# Patient Record
Sex: FEMALE | Race: WHITE | NOT HISPANIC OR LATINO | URBAN - METROPOLITAN AREA
[De-identification: names, ages, dates, MRNs, and addresses within clinical notes are randomized per-mention and may not be internally consistent; named-entity substitution may affect disease eponyms.]

---

## 2017-04-15 ENCOUNTER — EMERGENCY (EMERGENCY)
Facility: HOSPITAL | Age: 22
LOS: 0 days | Discharge: HOME | End: 2017-04-15
Admitting: PEDIATRICS

## 2017-06-27 DIAGNOSIS — Z90.49 ACQUIRED ABSENCE OF OTHER SPECIFIED PARTS OF DIGESTIVE TRACT: ICD-10-CM

## 2017-06-27 DIAGNOSIS — Z88.0 ALLERGY STATUS TO PENICILLIN: ICD-10-CM

## 2017-06-27 DIAGNOSIS — R07.81 PLEURODYNIA: ICD-10-CM

## 2017-06-27 DIAGNOSIS — M54.5 LOW BACK PAIN: ICD-10-CM

## 2017-06-27 DIAGNOSIS — R30.0 DYSURIA: ICD-10-CM

## 2017-06-27 DIAGNOSIS — N39.0 URINARY TRACT INFECTION, SITE NOT SPECIFIED: ICD-10-CM

## 2017-07-08 ENCOUNTER — EMERGENCY (EMERGENCY)
Facility: HOSPITAL | Age: 22
LOS: 0 days | Discharge: HOME | End: 2017-07-08
Admitting: PEDIATRICS

## 2017-07-08 DIAGNOSIS — Z90.49 ACQUIRED ABSENCE OF OTHER SPECIFIED PARTS OF DIGESTIVE TRACT: ICD-10-CM

## 2017-07-08 DIAGNOSIS — K35.80 UNSPECIFIED ACUTE APPENDICITIS: ICD-10-CM

## 2017-07-08 DIAGNOSIS — Z88.0 ALLERGY STATUS TO PENICILLIN: ICD-10-CM

## 2017-07-08 DIAGNOSIS — O03.9 COMPLETE OR UNSPECIFIED SPONTANEOUS ABORTION WITHOUT COMPLICATION: ICD-10-CM

## 2017-07-08 DIAGNOSIS — N93.9 ABNORMAL UTERINE AND VAGINAL BLEEDING, UNSPECIFIED: ICD-10-CM

## 2018-02-23 ENCOUNTER — INPATIENT (INPATIENT)
Facility: HOSPITAL | Age: 23
LOS: 1 days | Discharge: HOME | End: 2018-02-25
Attending: INTERNAL MEDICINE

## 2018-02-23 VITALS
TEMPERATURE: 98 F | HEART RATE: 90 BPM | RESPIRATION RATE: 18 BRPM | SYSTOLIC BLOOD PRESSURE: 132 MMHG | OXYGEN SATURATION: 98 % | DIASTOLIC BLOOD PRESSURE: 70 MMHG

## 2018-02-23 DIAGNOSIS — Z00.00 ENCOUNTER FOR GENERAL ADULT MEDICAL EXAMINATION WITHOUT ABNORMAL FINDINGS: ICD-10-CM

## 2018-02-23 DIAGNOSIS — Z98.890 OTHER SPECIFIED POSTPROCEDURAL STATES: Chronic | ICD-10-CM

## 2018-02-23 DIAGNOSIS — R20.0 ANESTHESIA OF SKIN: ICD-10-CM

## 2018-02-23 DIAGNOSIS — M32.9 SYSTEMIC LUPUS ERYTHEMATOSUS, UNSPECIFIED: ICD-10-CM

## 2018-02-23 LAB
ALBUMIN SERPL ELPH-MCNC: 4 G/DL — SIGNIFICANT CHANGE UP (ref 3–5.5)
ALP SERPL-CCNC: 95 U/L — SIGNIFICANT CHANGE UP (ref 30–115)
ALT FLD-CCNC: 18 U/L — SIGNIFICANT CHANGE UP (ref 0–41)
ANION GAP SERPL CALC-SCNC: 8 MMOL/L — SIGNIFICANT CHANGE UP (ref 7–14)
APTT BLD: 30.3 SEC — SIGNIFICANT CHANGE UP (ref 27–39.2)
AST SERPL-CCNC: 16 U/L — SIGNIFICANT CHANGE UP (ref 0–41)
BASOPHILS # BLD AUTO: 0.03 K/UL — SIGNIFICANT CHANGE UP (ref 0–0.2)
BASOPHILS NFR BLD AUTO: 0.4 % — SIGNIFICANT CHANGE UP (ref 0–1)
BILIRUB SERPL-MCNC: 0.8 MG/DL — SIGNIFICANT CHANGE UP (ref 0.2–1.2)
BLD GP AB SCN SERPL QL: SIGNIFICANT CHANGE UP
BUN SERPL-MCNC: 14 MG/DL — SIGNIFICANT CHANGE UP (ref 10–20)
CALCIUM SERPL-MCNC: 9.3 MG/DL — SIGNIFICANT CHANGE UP (ref 8.5–10.1)
CHLORIDE SERPL-SCNC: 103 MMOL/L — SIGNIFICANT CHANGE UP (ref 98–110)
CO2 SERPL-SCNC: 25 MMOL/L — SIGNIFICANT CHANGE UP (ref 17–32)
CREAT SERPL-MCNC: 0.7 MG/DL — SIGNIFICANT CHANGE UP (ref 0.7–1.5)
EOSINOPHIL # BLD AUTO: 0.19 K/UL — SIGNIFICANT CHANGE UP (ref 0–0.7)
EOSINOPHIL NFR BLD AUTO: 2.4 % — SIGNIFICANT CHANGE UP (ref 0–8)
GLUCOSE SERPL-MCNC: 102 MG/DL — SIGNIFICANT CHANGE UP (ref 70–110)
HCG SERPL-ACNC: <0.6 MIU/ML — SIGNIFICANT CHANGE UP (ref 0–5)
HCT VFR BLD CALC: 42.4 % — SIGNIFICANT CHANGE UP (ref 37–47)
HGB BLD-MCNC: 14.5 G/DL — SIGNIFICANT CHANGE UP (ref 14–18)
IMM GRANULOCYTES NFR BLD AUTO: 0.2 % — SIGNIFICANT CHANGE UP (ref 0.1–0.3)
INR BLD: 1.14 RATIO — SIGNIFICANT CHANGE UP (ref 0.65–1.3)
LYMPHOCYTES # BLD AUTO: 2.78 K/UL — SIGNIFICANT CHANGE UP (ref 1.2–3.4)
LYMPHOCYTES # BLD AUTO: 34.4 % — SIGNIFICANT CHANGE UP (ref 20.5–51.1)
MCHC RBC-ENTMCNC: 29.9 PG — SIGNIFICANT CHANGE UP (ref 27–31)
MCHC RBC-ENTMCNC: 34.2 G/DL — SIGNIFICANT CHANGE UP (ref 32–37)
MCV RBC AUTO: 87.4 FL — SIGNIFICANT CHANGE UP (ref 81–91)
MONOCYTES # BLD AUTO: 0.52 K/UL — SIGNIFICANT CHANGE UP (ref 0.1–0.6)
MONOCYTES NFR BLD AUTO: 6.4 % — SIGNIFICANT CHANGE UP (ref 1.7–9.3)
NEUTROPHILS # BLD AUTO: 4.54 K/UL — SIGNIFICANT CHANGE UP (ref 1.4–6.5)
NEUTROPHILS NFR BLD AUTO: 56.2 % — SIGNIFICANT CHANGE UP (ref 42.2–75.2)
NRBC # BLD: 0 /100 WBCS — SIGNIFICANT CHANGE UP (ref 0–0)
PLATELET # BLD AUTO: 287 K/UL — SIGNIFICANT CHANGE UP (ref 130–400)
POTASSIUM SERPL-MCNC: 4 MMOL/L — SIGNIFICANT CHANGE UP (ref 3.5–5)
POTASSIUM SERPL-SCNC: 4 MMOL/L — SIGNIFICANT CHANGE UP (ref 3.5–5)
PROT SERPL-MCNC: 7.4 G/DL — SIGNIFICANT CHANGE UP (ref 6–8)
PROTHROM AB SERPL-ACNC: 12.4 SEC — SIGNIFICANT CHANGE UP (ref 9.95–12.87)
RBC # BLD: 4.85 M/UL — SIGNIFICANT CHANGE UP (ref 4.2–5.4)
RBC # FLD: 11.6 % — SIGNIFICANT CHANGE UP (ref 11.5–14.5)
SODIUM SERPL-SCNC: 136 MMOL/L — SIGNIFICANT CHANGE UP (ref 135–146)
TROPONIN I SERPL-MCNC: <0.02 NG/ML — SIGNIFICANT CHANGE UP (ref 0–0.05)
TYPE + AB SCN PNL BLD: SIGNIFICANT CHANGE UP
WBC # BLD: 8.08 K/UL — SIGNIFICANT CHANGE UP (ref 4.8–10.8)
WBC # FLD AUTO: 8.08 K/UL — SIGNIFICANT CHANGE UP (ref 4.8–10.8)

## 2018-02-23 RX ORDER — HEPARIN SODIUM 5000 [USP'U]/ML
5000 INJECTION INTRAVENOUS; SUBCUTANEOUS EVERY 8 HOURS
Qty: 0 | Refills: 0 | Status: DISCONTINUED | OUTPATIENT
Start: 2018-02-23 | End: 2018-02-25

## 2018-02-23 RX ORDER — ATORVASTATIN CALCIUM 80 MG/1
80 TABLET, FILM COATED ORAL AT BEDTIME
Qty: 0 | Refills: 0 | Status: DISCONTINUED | OUTPATIENT
Start: 2018-02-23 | End: 2018-02-25

## 2018-02-23 RX ORDER — ACETAMINOPHEN 500 MG
650 TABLET ORAL EVERY 6 HOURS
Qty: 0 | Refills: 0 | Status: DISCONTINUED | OUTPATIENT
Start: 2018-02-23 | End: 2018-02-25

## 2018-02-23 RX ORDER — SODIUM CHLORIDE 9 MG/ML
1000 INJECTION INTRAMUSCULAR; INTRAVENOUS; SUBCUTANEOUS
Qty: 0 | Refills: 0 | Status: DISCONTINUED | OUTPATIENT
Start: 2018-02-23 | End: 2018-02-25

## 2018-02-23 RX ORDER — ASPIRIN/CALCIUM CARB/MAGNESIUM 324 MG
325 TABLET ORAL ONCE
Qty: 0 | Refills: 0 | Status: COMPLETED | OUTPATIENT
Start: 2018-02-23 | End: 2018-02-23

## 2018-02-23 RX ORDER — ASPIRIN/CALCIUM CARB/MAGNESIUM 324 MG
81 TABLET ORAL DAILY
Qty: 0 | Refills: 0 | Status: DISCONTINUED | OUTPATIENT
Start: 2018-02-23 | End: 2018-02-25

## 2018-02-23 RX ADMIN — HEPARIN SODIUM 5000 UNIT(S): 5000 INJECTION INTRAVENOUS; SUBCUTANEOUS at 21:43

## 2018-02-23 RX ADMIN — Medication 325 MILLIGRAM(S): at 17:34

## 2018-02-23 RX ADMIN — SODIUM CHLORIDE 100 MILLILITER(S): 9 INJECTION INTRAMUSCULAR; INTRAVENOUS; SUBCUTANEOUS at 23:03

## 2018-02-23 RX ADMIN — ATORVASTATIN CALCIUM 80 MILLIGRAM(S): 80 TABLET, FILM COATED ORAL at 21:44

## 2018-02-23 RX ADMIN — HEPARIN SODIUM 5000 UNIT(S): 5000 INJECTION INTRAVENOUS; SUBCUTANEOUS at 15:08

## 2018-02-23 RX ADMIN — SODIUM CHLORIDE 100 MILLILITER(S): 9 INJECTION INTRAMUSCULAR; INTRAVENOUS; SUBCUTANEOUS at 15:08

## 2018-02-23 NOTE — PROGRESS NOTE ADULT - SUBJECTIVE AND OBJECTIVE BOX
***STROKE ALERT CONSULT NOTE    Chief Complaint: stroke alert    Last known normal time:    HPI: 22 yo f pmh of possible lupus, reports MELANIE elevated. Went to sleep 1130 previous night asymptomatic. Woke up 0700 with left sided headache and left tounge/lue/lle numbness. Patients symptoms improved but are persistent, headache is persistent. Patient came to ER and stroke code called.       PAST MEDICAL & SURGICAL HISTORY:  No pertinent past medical history  No significant past surgical history      FAMILY HISTORY:      Social History: (-) x 3    Allergies    ciprofloxacin (Other (Mild))  penicillins (Unknown)    MEDICATIONS  (STANDING):    MEDICATIONS  (PRN):        Vital Signs Last 24 Hrs  T(C): 36.5 (23 Feb 2018 09:42), Max: 36.5 (23 Feb 2018 09:42)  T(F): 97.7 (23 Feb 2018 09:42), Max: 97.7 (23 Feb 2018 09:42)  HR: 90 (23 Feb 2018 09:42) (90 - 90)  BP: 132/70 (23 Feb 2018 09:42) (132/70 - 132/70)  BP(mean): --  RR: 18 (23 Feb 2018 09:42) (18 - 18)  SpO2: 98% (23 Feb 2018 09:42) (98% - 98%)    Neurologic Examination:  General:  Appearance is consistent with chronologic age.  No abnormal facies.   General: The patient is oriented to person, place, time and date.   Fund of knowledge is intact and normal.  Language with normal repetition, comprehension and naming.  Nondysarthric.    Cranial nerves: intact VA, VFF.  EOMI w/o nystagmus, skew or reported double vision.  PERRL.  No ptosis/weakness of eyelid closure.  Facial sensation is normal with normal bite. Mild left nasolabial fold flattening.  Hearing grossly intact b/l.  Palate elevates midline.  Tongue midline.  Motor examination:   Normal tone, bulk and range of motion.  No tenderness, twitching, tremors or involuntary movements.  Formal Muscle Strength Testing: (MRC grade R/L) 5/5 UE; 5/5 LE.  No observable drift.  Reflexes:   2+ b/l pectoralis, biceps, triceps, brachioradialis, patella and Achilles.  Plantar response downgoing b/l.  Jaw jerk, Arielle, clonus absent.  Sensory examination: decreased sensation to light touch on lue lle and left forehead/reports tongue numbness.  Cerebellum:   FTN/HKS intact with normal AVIVA in all limbs.  No dysmetria or dysdiadokinesia.    NIH Stroke Scale:2    Labs:                   Neuroimaging:  NCHCT:    CT Angiography/Perfusion (if applicable):    MRI Brain NC (if applicable):    MRA Head/Neck (if applicable):    Echocardiography:    Carodtid/Transcranial Duplex:    Assessment:  This is a 23y Female with h/o possible lupus, reports MELANIE elevated. Went to sleep 1130 previous night asymptomatic. Woke up 0700 with left sided headache and left tounge/lue/lle numbness. Patients symptoms improved but are persistent, headache is persistent. Patient came to ER and stroke code called.   Patient outside therapeutic window for TPA.    Suggestion:  Stroke Unit(Dr Obrien approved)  MRI BRain w/o mirian  TTE bubble study  MELANIE  Lipid Profile/Hemoglobin A1C    Ernesto Cm NP  -   02-23-18 @ 10:23 ***STROKE ALERT CONSULT NOTE    Chief Complaint: stroke alert    Last known normal time:    HPI: 22 yo f pmh of possible lupus, reports MELANIE elevated. Went to sleep 1130 previous night asymptomatic. Woke up 0700 with left sided headache and left tounge/lue/lle numbness. Patients symptoms improved but are persistent, headache is persistent. Patient came to ER and stroke code called.       PAST MEDICAL & SURGICAL HISTORY:  No pertinent past medical history  No significant past surgical history      FAMILY HISTORY:      Social History: (-) x 3    Allergies    ciprofloxacin (Other (Mild))  penicillins (Unknown)    MEDICATIONS  (STANDING):    MEDICATIONS  (PRN):        Vital Signs Last 24 Hrs  T(C): 36.5 (23 Feb 2018 09:42), Max: 36.5 (23 Feb 2018 09:42)  T(F): 97.7 (23 Feb 2018 09:42), Max: 97.7 (23 Feb 2018 09:42)  HR: 90 (23 Feb 2018 09:42) (90 - 90)  BP: 132/70 (23 Feb 2018 09:42) (132/70 - 132/70)  BP(mean): --  RR: 18 (23 Feb 2018 09:42) (18 - 18)  SpO2: 98% (23 Feb 2018 09:42) (98% - 98%)    Neurologic Examination:  General:  Appearance is consistent with chronologic age.  No abnormal facies.   General: The patient is oriented to person, place, time and date.   Fund of knowledge is intact and normal.  Language with normal repetition, comprehension and naming.  Nondysarthric.    Cranial nerves: intact VA, VFF.  EOMI w/o nystagmus, skew or reported double vision.  PERRL.  No ptosis/weakness of eyelid closure.  Facial sensation is normal with normal bite. Mild left nasolabial fold flattening.  Hearing grossly intact b/l.  Palate elevates midline.  Tongue midline.  Motor examination:   Normal tone, bulk and range of motion.  No tenderness, twitching, tremors or involuntary movements.  Formal Muscle Strength Testing: (MRC grade R/L) 5/5 UE; 5/5 LE.  No observable drift.  Reflexes:   2+ b/l pectoralis, biceps, triceps, brachioradialis, patella and Achilles.  Plantar response downgoing b/l.  Jaw jerk, Arielle, clonus absent.  Sensory examination: decreased sensation to light touch on lue lle and left forehead/reports tongue numbness.  Cerebellum:   FTN/HKS intact with normal AVIVA in all limbs.  No dysmetria or dysdiadokinesia.    NIH Stroke Scale:2    Labs:                   Neuroimaging:  NCHCT:< from: CT Head No Cont (02.23.18 @ 10:21) >  1.  No CT evidence of acute intracranial pathology    2.  Paranasal sinus disease as described above.      < end of copied text >      CT Angiography/Perfusion (if applicable):	pending cta h+n    MRI Brain NC (if applicable):    MRA Head/Neck (if applicable):    Echocardiography:    Carodtid/Transcranial Duplex:    Assessment:  This is a 23y Female with h/o possible lupus, reports MELANIE elevated. Went to sleep 1130 previous night asymptomatic. Woke up 0700 with left sided headache and left tounge/lue/lle numbness. Patients symptoms improved but are persistent, headache is persistent. Patient came to ER and stroke code called.   Patient outside therapeutic window for TPA.    Suggestion:  Stroke Unit(Dr Obrien approved)  MRI BRain w/wo mirian  TTE bubble study  Lipid Profile/Hemoglobin A1C  normal saline 100 ml/hr  Ernesto Cm NP  -   02-23-18 @ 10:23 ***STROKE ALERT CONSULT NOTE    Chief Complaint: stroke alert    Last known normal time:    HPI: 24 yo f pmh of possible lupus, reports MELANIE elevated. Went to sleep 1130 previous night asymptomatic. Woke up 0700 with left sided headache and left tounge/lue/lle numbness. Patients symptoms improved but are persistent, headache is persistent. Patient came to ER and stroke code called.       PAST MEDICAL & SURGICAL HISTORY:  No pertinent past medical history  No significant past surgical history      FAMILY HISTORY:      Social History: (-) x 3    Allergies    ciprofloxacin (Other (Mild))  penicillins (Unknown)    MEDICATIONS  (STANDING):    MEDICATIONS  (PRN):        Vital Signs Last 24 Hrs  T(C): 36.5 (23 Feb 2018 09:42), Max: 36.5 (23 Feb 2018 09:42)  T(F): 97.7 (23 Feb 2018 09:42), Max: 97.7 (23 Feb 2018 09:42)  HR: 90 (23 Feb 2018 09:42) (90 - 90)  BP: 132/70 (23 Feb 2018 09:42) (132/70 - 132/70)  BP(mean): --  RR: 18 (23 Feb 2018 09:42) (18 - 18)  SpO2: 98% (23 Feb 2018 09:42) (98% - 98%)    Neurologic Examination:  General:  Appearance is consistent with chronologic age.  No abnormal facies.   General: The patient is oriented to person, place, time and date.   Fund of knowledge is intact and normal.  Language with normal repetition, comprehension and naming.  Nondysarthric.    Cranial nerves: intact VA, VFF.  EOMI w/o nystagmus, skew or reported double vision.  PERRL.  No ptosis/weakness of eyelid closure.  Facial sensation is normal with normal bite. Mild left nasolabial fold flattening.  Hearing grossly intact b/l.  Palate elevates midline.  Tongue midline.  Motor examination:   Normal tone, bulk and range of motion.  No tenderness, twitching, tremors or involuntary movements.  Formal Muscle Strength Testing: (MRC grade R/L) 5/5 UE; 5/5 LE.  No observable drift.  Reflexes:   2+ b/l pectoralis, biceps, triceps, brachioradialis, patella and Achilles.  Plantar response downgoing b/l.  Jaw jerk, Arielle, clonus absent.  Sensory examination: decreased sensation to light touch on lue lle and left forehead/reports tongue numbness.  Cerebellum:   FTN/HKS intact with normal AVIVA in all limbs.  No dysmetria or dysdiadokinesia.    NIH Stroke Scale:2    Labs:                   Neuroimaging:  NCHCT:< from: CT Head No Cont (02.23.18 @ 10:21) >  1.  No CT evidence of acute intracranial pathology    2.  Paranasal sinus disease as described above.      < end of copied text >      CT Angiography/Perfusion (if applicable):	pending cta h+n    MRI Brain NC (if applicable):    MRA Head/Neck (if applicable):    Echocardiography:    Carodtid/Transcranial Duplex:    Assessment:  This is a 23y Female with h/o possible lupus, reports MELANIE elevated. Went to sleep 1130 previous night asymptomatic. Woke up 0700 with left sided headache and left tounge/lue/lle numbness. Patients symptoms improved but are persistent, headache is persistent. Patient came to ER and stroke code called.   Patient outside therapeutic window for TPA.    Suggestion:  Stroke Unit(Dr Obrien approved)  MRI BRain w/wo mirian  TTE bubble study  asa/lipitor  Lipid Profile/Hemoglobin A1C  normal saline 100 ml/hr  Ernesto Cm NP  -   02-23-18 @ 10:23

## 2018-02-23 NOTE — CONSULT NOTE ADULT - SUBJECTIVE AND OBJECTIVE BOX
HPI:  This is a 24 YO F, PMH Lupus, not taking medications, p/w above CC. She states that she woke this morning at 7:00am with a severe headache, and when she used her hand to rub her face she realized she had no feeling in her left hand. The numbness includes her left UE, LE, face, and left side of her tongue. She also states her left hand was clumsy and she had difficulty getting dressed with that hand. She denies and recent illnesses and states she was feeling well the night prior to going to sleep. Denies any other symptoms, ROS otherwise negative.    VITALS:   Vital Signs Last 24 Hrs  T(C): 35.6 (2018 11:34), Max: 36.5 (2018 09:42)  T(F): 96 (:34), Max: 97.7 (2018 09:42)  HR: 76 (:34) (76 - 90)  BP: 100/55 (2018 11:34) (100/55 - 132/70)  BP(mean): --  RR: 18 (2018 11:34) (18 - 18)  SpO2: 98% (2018 11:34) (98% - 98%)  Daily     Daily Weight in k.3 (2018 11:34) (2018 13:18)      T(C): 36.6 (18 @ 16:24), Max: 36.6 (18 @ 16:24)  HR: 69 (-18 @ 16:24) (69 - 90)  BP: 111/73 (23-18 @ 16:24) (100/55 - 132/70)  RR: 18 (-18 @ 16:24) (18 - 18)  SpO2: 100% (-18 @ 16:24) (98% - 100%)    MOTOR EXAM  Good ROM All EXt MS 5/5   diminished sensation lue and lle   -drift  AVIVA coordination LUE WFL  AMBULATES IN ER WITHOUT DIFFICULTY      Physical Medicine And Rehabilitation Services are not indicated in this patient for the following Reason(s):    [    ] Patient is medically unstable      [    ]  Patient does not have appropriate activity orders      [     ] Patient has no weight bearing status for:      [  x   ] Patient is independently ambulating      [     ]  Patient is from Skilled Nursing Facility and is appropriate to return      [     ] Patient was non-ambulatory prior to admission      [     ]  Other  DC HOME ONCE NEURO W/U COMPLETED    WILL CANCEL PM&R / PT request

## 2018-02-23 NOTE — H&P ADULT - PROBLEM SELECTOR PLAN 1
Stroke Unit(Dr Obrien approved)  MRI Brain w/wo mirian  TTE bubble study  Lipid Profile  HbA1C  normal saline 100 ml/hr

## 2018-02-23 NOTE — H&P ADULT - NSHPPHYSICALEXAM_GEN_ALL_CORE
PHYSICAL EXAM:  GENERAL: NAD, well-developed  HEAD:  Atraumatic, Normocephalic  EYES: EOMI, PERRLA, conjunctiva and sclera clear  NECK: Supple, No JVD  CHEST/LUNG: Clear to auscultation bilaterally; No wheeze  HEART: Regular rate and rhythm; No murmurs, rubs, or gallops  ABDOMEN: Soft, Nontender, Nondistended; Bowel sounds present  EXTREMITIES:  2+ Peripheral Pulses, No clubbing, cyanosis, or edema  PSYCH: AAOx3  NEUROLOGY: L sided sensory decreased. No motor deficits, CN II-XII intact, no cerebellar findings.  SKIN: No rashes or lesions

## 2018-02-23 NOTE — ED PROVIDER NOTE - OBJECTIVE STATEMENT
Pertinent PMH/PSH/FHx/SHx and Review of Systems contained within:  Patient presents to the ED for  22y/o F w/ hx of lupus-- not taking any medications because it was making her gain weight.  Pt woke up this morning with headache to occiput and L. side of head (has hx of headaches, but never this bad) pt also had some discomfort to her L. hand.  20 mins before presentation pt started to get numbness to L. hand, leg,     PCP-Dr. Bunch

## 2018-02-23 NOTE — H&P ADULT - NSHPREVIEWOFSYSTEMS_GEN_ALL_CORE
REVIEW OF SYSTEMS:    CONSTITUTIONAL: No weakness, fevers or chills  EYES/ENT: No visual changes;  No vertigo or throat pain   NECK: No pain or stiffness  RESPIRATORY: No cough, wheezing, hemoptysis; No shortness of breath  CARDIOVASCULAR: No chest pain or palpitations  GASTROINTESTINAL: No abdominal or epigastric pain. No nausea, vomiting, or hematemesis; No diarrhea or constipation. No melena or hematochezia.  GENITOURINARY: No dysuria, frequency or hematuria  NEUROLOGICAL: See HPI  MUSCULOSKELETAL: No muscle or joint pain, stiffness, or swelling  SKIN: No itching, rashes

## 2018-02-23 NOTE — ED PROVIDER NOTE - PHYSICAL EXAMINATION
VITAL SIGNS: I have reviewed nursing notes and confirm.  CONSTITUTIONAL: Well-developed; well-nourished; in no acute distress. pt comfortable.  SKIN: skin exam is warm and dry, no acute rash.   HEAD: Normocephalic; atraumatic.  EYES:  EOM intact; conjunctiva and sclera clear.  ENT: No nasal discharge; airway clear. moist oral mucosa; uvula at midline. no pharyngeal erythema, edema exudate or vesicles.  TMs with good light reflex b/l.    NECK: Supple; non tender.  CARD: S1, S2 normal; no murmurs, gallops, or rubs. Regular rate and rhythm. posterior tibial and radial pulses 2+  RESP: No wheezes, rales or rhonchi. cta b/l. no use of accessory muscles. no retractions  ABD: Normal bowel sounds; soft; non-distended; non-tender; no rebound. negative psoas, rovsign's and murphys.  EXT: Normal ROM. No  cyanosis or edema.  BACK: No cva tenderness  LYMPH: No acute cervical adenopathy  NEURO: Alert, oriented, grossly unremarkable.  CN 2-12 intact--aside mild nasolabial flattening to left. drift to left hand w/ romberg.  sensory decrease to L face, upper and lower extremity.  5/5 strength to ,  flexion at hip  PSYCH: Cooperative, appropriate. VITAL SIGNS: I have reviewed nursing notes and confirm.  CONSTITUTIONAL: Well-developed; well-nourished; in no acute distress. pt comfortable.  SKIN: skin exam is warm and dry, no acute rash.   HEAD: Normocephalic; atraumatic.  EYES:  EOM intact; conjunctiva and sclera clear.  ENT: No nasal discharge; airway clear. moist oral mucosa; uvula at midline. no pharyngeal erythema, edema exudate or vesicles.  TMs with good light reflex b/l.    NECK: Supple; non tender.  CARD: S1, S2 normal; no murmurs, gallops, or rubs. Regular rate and rhythm. posterior tibial and radial pulses 2+  RESP: No wheezes, rales or rhonchi. cta b/l. no use of accessory muscles. no retractions  ABD: Normal bowel sounds; soft; non-distended; non-tender; no rebound. negative psoas, rovsign's and murphys.  EXT: Normal ROM. No  cyanosis or edema.  BACK: No cva tenderness  LYMPH: No acute cervical adenopathy  NEURO: Alert, oriented, grossly unremarkable.  CN 2-12 intact--aside mild nasolabial flattening to left. drift to left hand w/ romberg.  sensory decrease to L face, upper and lower extremity.  5/5 strength to ,  flexion at hip. pt providing history-- no slurring.  able to repeat phrases.  PSYCH: Cooperative, appropriate.

## 2018-02-23 NOTE — H&P ADULT - HISTORY OF PRESENT ILLNESS
This is a 24 YO F, PMH Lupus, not taking medications, p/w above CC. She states that she woke this morning at 7:00am with a severe headache, and when she used her hand to rub her face she realized she had no feeling in her left hand. The numbness includes her left UE, LE, face, and left side of her tongue. She also states her left hand was clumsy and she had difficulty getting dressed with that hand. She denies and recent illnesses and states she was feeling well the night prior to going to sleep. Denies any other symptoms, ROS otherwise negative.    VITALS:   Vital Signs Last 24 Hrs  T(C): 35.6 (2018 11:34), Max: 36.5 (2018 09:42)  T(F): 96 (:34), Max: 97.7 (2018 09:42)  HR: 76 (:) (76 - 90)  BP: 100/55 (:34) (100/55 - 132/70)  BP(mean): --  RR: 18 (2018 11:34) (18 - 18)  SpO2: 98% (:34) (98% - 98%)  Daily     Daily Weight in k.3 (:34)

## 2018-02-23 NOTE — ED ADULT NURSE NOTE - OBJECTIVE STATEMENT
pt with c/o headache today, pt felt right hand numbness , left leg numbness and lip numbness twenty min prior to coming to the er. stroke code called at 09:00 in er.

## 2018-02-23 NOTE — H&P ADULT - ATTENDING COMMENTS
Pt seen and evaluated independently.  She states numbness of left arm, face and leg is improved but intermittent.    She states she was dx with SLE and on prednisone at one point, but not now.   Neurology consult appreciated.   Awaiting MRI of brain and 2D ECHO - rule out CVA.   On ASA and statin for now.   I reviewed the resident's note and I agree with the physical exam, assessment, and plan with additions as above.

## 2018-02-23 NOTE — H&P ADULT - ASSESSMENT
22 YO F, PMH of Lupus not on medications p/w L sided numbness. Stroke code called in ED but cancelled as Pt was outside of therapeutic window for tPA. Seen by neuro who recommend stroke unit observation and MRI.

## 2018-02-23 NOTE — ED PROVIDER NOTE - NS ED ROS FT
ROS: No fever/chills, No headache/photophobia/eye pain/changes in vision, No ear pain/sore throat/dysphagia, No chest pain/palpitations, no SOB/cough/wheeze/stridor, No abdominal pain, No N/V/D/melena, no dysuria/frequency/discharge, No neck/back pain, no rash,       +numbness to l. side of body.

## 2018-02-23 NOTE — H&P ADULT - NSHPLABSRESULTS_GEN_ALL_CORE
14.5   8.08  )-----------( 287      ( 23 Feb 2018 09:55 )             42.4       02-23    136  |  103  |  14  ----------------------------<  102  4.0   |  25  |  0.7    Ca    9.3      23 Feb 2018 09:55    TPro  7.4  /  Alb  4.0  /  TBili  0.8  /  DBili  x   /  AST  16  /  ALT  18  /  AlkPhos  95  02-23          PT/INR - ( 23 Feb 2018 09:55 )   PT: 12.40 sec;   INR: 1.14 ratio         PTT - ( 23 Feb 2018 09:55 )  PTT:30.3 sec    Lactate Trend    CARDIAC MARKERS ( 23 Feb 2018 09:55 )  <0.02 ng/mL / x     / x     / x     / x        CAPILLARY BLOOD GLUCOSE  102 (23 Feb 2018 10:16)    IMAGING:     < from: CT Head No Cont (02.23.18 @ 10:21) >    1.  No CT evidence of acute intracranial pathology    2.  Paranasal sinus disease as described above.    If the patient continues to be symptomatic follow-up CT scan and/or MRI   of the brain may be helpful for further evaluation.    < end of copied text >    < from: Xray Chest 1 View AP/PA (02.23.18 @ 10:08) >    No radiographic evidence of acute cardiopulmonary disease.    < end of copied text >    < from: 12 Lead ECG (02.23.18 @ 11:03) >    Ventricular Rate 72 BPM    Atrial Rate 72 BPM    P-R Interval 122 ms    QRS Duration 76 ms    Q-T Interval 394 ms    QTC Calculation(Bezet) 431 ms    P Axis 14 degrees    R Axis 20 degrees    T Axis 33 degrees    Diagnosis Line Normal sinus rhythm  Normal ECG    < end of copied text >

## 2018-02-23 NOTE — ED PROVIDER NOTE - ATTENDING CONTRIBUTION TO CARE
Pt is a 24yo female with ?Lupus noncompliant with Prednisone who comes in after waking up today with a headache and developing L arm numbness/weakness 20min PTA.  She couldn't feel the cold or hot water.  She also developed some numbness into the L leg and L tongue.    Exam: L arm 4/5 weakness, R arm normal, L leg 4.5/5 strength, R leg 5/5 strength, limping gait, normal CNs  Imp: r/o CVA, ?complex migraine  Plan: stroke code, imaging, neuro, labs

## 2018-02-23 NOTE — STROKE CODE NOTE - NIH STROKE SCALE: 8. SENSORY, QM
(1) Mild-to-moderate sensory loss; patient feels pinprick is less sharp or is dull on the affected side; or there is a loss of superficial pain with pinprick, but patient is aware of being touched
(1) Mild-to-moderate sensory loss; patient feels pinprick is less sharp or is dull on the affected side; or there is a loss of superficial pain with pinprick, but patient is aware of being touched

## 2018-02-24 LAB
ANION GAP SERPL CALC-SCNC: 5 MMOL/L — LOW (ref 7–14)
BASOPHILS # BLD AUTO: 0.03 K/UL — SIGNIFICANT CHANGE UP (ref 0–0.2)
BASOPHILS NFR BLD AUTO: 0.4 % — SIGNIFICANT CHANGE UP (ref 0–1)
BUN SERPL-MCNC: 10 MG/DL — SIGNIFICANT CHANGE UP (ref 10–20)
CALCIUM SERPL-MCNC: 9.3 MG/DL — SIGNIFICANT CHANGE UP (ref 8.5–10.1)
CHLORIDE SERPL-SCNC: 107 MMOL/L — SIGNIFICANT CHANGE UP (ref 98–110)
CHOLEST SERPL-MCNC: 166 MG/DL — SIGNIFICANT CHANGE UP (ref 100–200)
CO2 SERPL-SCNC: 24 MMOL/L — SIGNIFICANT CHANGE UP (ref 17–32)
CREAT SERPL-MCNC: 0.7 MG/DL — SIGNIFICANT CHANGE UP (ref 0.7–1.5)
EOSINOPHIL # BLD AUTO: 0.14 K/UL — SIGNIFICANT CHANGE UP (ref 0–0.7)
EOSINOPHIL NFR BLD AUTO: 1.8 % — SIGNIFICANT CHANGE UP (ref 0–8)
GLUCOSE SERPL-MCNC: 99 MG/DL — SIGNIFICANT CHANGE UP (ref 70–110)
HCT VFR BLD CALC: 37.3 % — SIGNIFICANT CHANGE UP (ref 37–47)
HDLC SERPL-MCNC: 42 MG/DL — SIGNIFICANT CHANGE UP (ref 40–60)
HGB BLD-MCNC: 12.9 G/DL — LOW (ref 14–18)
IMM GRANULOCYTES NFR BLD AUTO: 0.3 % — SIGNIFICANT CHANGE UP (ref 0.1–0.3)
LIPID PNL WITH DIRECT LDL SERPL: 104 MG/DL — HIGH (ref 50–100)
LYMPHOCYTES # BLD AUTO: 2.63 K/UL — SIGNIFICANT CHANGE UP (ref 1.2–3.4)
LYMPHOCYTES # BLD AUTO: 33.7 % — SIGNIFICANT CHANGE UP (ref 20.5–51.1)
MAGNESIUM SERPL-MCNC: 2 MG/DL — SIGNIFICANT CHANGE UP (ref 1.8–2.4)
MCHC RBC-ENTMCNC: 30.4 PG — SIGNIFICANT CHANGE UP (ref 27–31)
MCHC RBC-ENTMCNC: 34.6 G/DL — SIGNIFICANT CHANGE UP (ref 32–37)
MCV RBC AUTO: 87.8 FL — SIGNIFICANT CHANGE UP (ref 81–91)
MONOCYTES # BLD AUTO: 0.62 K/UL — HIGH (ref 0.1–0.6)
MONOCYTES NFR BLD AUTO: 7.9 % — SIGNIFICANT CHANGE UP (ref 1.7–9.3)
NEUTROPHILS # BLD AUTO: 4.37 K/UL — SIGNIFICANT CHANGE UP (ref 1.4–6.5)
NEUTROPHILS NFR BLD AUTO: 55.9 % — SIGNIFICANT CHANGE UP (ref 42.2–75.2)
PLATELET # BLD AUTO: 249 K/UL — SIGNIFICANT CHANGE UP (ref 130–400)
POTASSIUM SERPL-MCNC: 4.1 MMOL/L — SIGNIFICANT CHANGE UP (ref 3.5–5)
POTASSIUM SERPL-SCNC: 4.1 MMOL/L — SIGNIFICANT CHANGE UP (ref 3.5–5)
RBC # BLD: 4.25 M/UL — SIGNIFICANT CHANGE UP (ref 4.2–5.4)
RBC # FLD: 11.9 % — SIGNIFICANT CHANGE UP (ref 11.5–14.5)
SODIUM SERPL-SCNC: 136 MMOL/L — SIGNIFICANT CHANGE UP (ref 135–146)
TOTAL CHOLESTEROL/HDL RATIO MEASUREMENT: 4 RATIO — SIGNIFICANT CHANGE UP (ref 4–5.5)
TRIGL SERPL-MCNC: 135 MG/DL — SIGNIFICANT CHANGE UP (ref 40–150)
WBC # BLD: 7.81 K/UL — SIGNIFICANT CHANGE UP (ref 4.8–10.8)
WBC # FLD AUTO: 7.81 K/UL — SIGNIFICANT CHANGE UP (ref 4.8–10.8)

## 2018-02-24 RX ORDER — OXYCODONE HYDROCHLORIDE 5 MG/1
5 TABLET ORAL ONCE
Qty: 0 | Refills: 0 | Status: DISCONTINUED | OUTPATIENT
Start: 2018-02-24 | End: 2018-02-24

## 2018-02-24 RX ADMIN — OXYCODONE HYDROCHLORIDE 5 MILLIGRAM(S): 5 TABLET ORAL at 19:45

## 2018-02-24 RX ADMIN — HEPARIN SODIUM 5000 UNIT(S): 5000 INJECTION INTRAVENOUS; SUBCUTANEOUS at 21:13

## 2018-02-24 RX ADMIN — ATORVASTATIN CALCIUM 80 MILLIGRAM(S): 80 TABLET, FILM COATED ORAL at 21:11

## 2018-02-24 RX ADMIN — Medication 81 MILLIGRAM(S): at 11:56

## 2018-02-24 RX ADMIN — Medication 650 MILLIGRAM(S): at 06:07

## 2018-02-24 RX ADMIN — HEPARIN SODIUM 5000 UNIT(S): 5000 INJECTION INTRAVENOUS; SUBCUTANEOUS at 06:07

## 2018-02-24 RX ADMIN — OXYCODONE HYDROCHLORIDE 5 MILLIGRAM(S): 5 TABLET ORAL at 21:11

## 2018-02-24 NOTE — PROGRESS NOTE ADULT - SUBJECTIVE AND OBJECTIVE BOX
Neurology Follow up note    Name  NED RANDALL    HPI:  This is a 22 YO F, PMH Lupus, not taking medications, p/w above CC. She states that she woke this morning at 7:00am with a severe headache, and when she used her hand to rub her face she realized she had no feeling in her left hand. The numbness includes her left UE, LE, face, and left side of her tongue. She also states her left hand was clumsy and she had difficulty getting dressed with that hand. She denies and recent illnesses and states she was feeling well the night prior to going to sleep. Denies any other symptoms, ROS otherwise negative.    VITALS:   Vital Signs Last 24 Hrs  T(C): 35.6 (2018 11:34), Max: 36.5 (2018 09:42)  T(F): 96 (2018 11:34), Max: 97.7 (2018 09:42)  HR: 76 (2018 11:34) (76 - 90)  BP: 100/55 (2018 11:34) (100/55 - 132/70)  BP(mean): --  RR: 18 (2018 11:34) (18 - 18)  SpO2: 98% (2018 11:34) (98% - 98%)  Daily     Daily Weight in k.3 (2018 11:34) (2018 13:18)      Interval History -  Today her symptoms are improved rarely feels numbness on left side        Vital Signs Last 24 Hrs  T(C): 35.7 (2018 07:52), Max: 36.8 (2018 00:09)  T(F): 96.2 (2018 07:52), Max: 98.2 (2018 00:09)  HR: 77 (2018 07:52) (69 - 86)  BP: 114/72 (2018 07:52) (100/55 - 114/72)  BP(mean): --  RR: 18 (2018 07:52) (16 - 18)  SpO2: 96% (2018 07:52) (96% - 100%)          Neurological Exam:   Mental status: Awake, alert and oriented x3.  Recent and remote memory intact.  Naming, repetition and comprehension intact.  Attention/concentration intact.  No dysarthria, no aphasia.  Fund of knowledge appropriate.    Cranial nerves: pupils equally round and reactive to light, visual fields full, no nystagmus, extraocular muscles intact, V1 through V3 intact bilaterally and symmetric, face symmetric, hearing intact to finger rub, palate elevation symmetric, tongue was midline, sternocleidomastoid/shoulder shrug strength bilaterally 5/5.    Motor:  Normal bulk and tone, strength 5/5 in bilateral upper and lower extremities.   strength 5/5.  Rapid alternating movements intact and symmetric.   Sensation: Intact to light touch, temperature and pinprick.  No neglect.   Coordination: No dysmetria on finger-to-nose and heel-to-shin.  No clumsiness.  Reflexes: 2+ in upper and 1+ lower extremities, downgoing toes bilaterally  Gait: Narrow and steady. No ataxia.  Romberg negative    Medications  acetaminophen   Tablet 650 milliGRAM(s) Oral every 6 hours PRN  aspirin  chewable 81 milliGRAM(s) Oral daily  atorvastatin 80 milliGRAM(s) Oral at bedtime  heparin  Injectable 5000 Unit(s) SubCutaneous every 8 hours  sodium chloride 0.9%. 1000 milliLiter(s) IV Continuous <Continuous>  sodium chloride 0.9%. 1000 milliLiter(s) IV Continuous <Continuous>      Lab                        14.5   8.08  )-----------( 287      ( 2018 09:55 )             42.4       136  |  103  |  14  ----------------------------<  102  4.0   |  25  |  0.7    Ca    9.3      2018 09:55    TPro  7.4  /  Alb  4.0  /  TBili  0.8  /  DBili  x   /  AST  16  /  ALT  18  /  AlkPhos  95          Radiology  < from: CT Angio Neck w/ IV Cont (18 @ 11:05) >  INTERPRETATION:  CLINICAL HISTORY/REASON FOR EXAM: Stroke Code.    Technique: CTA of the neck was performed with sagittal and coronal   reformatted images as well as 3-D volume rendered images after the   intravenous administration of 125 cc of Optiray contrast.    Comparison: None.    Findings:     There are normal origins of the innominate, left common carotid and left   subclavian artery off the aortic arch.    There is normal contrast filling the bilateral common carotid arteries   with normal carotid bifurcations at C3-4 level.  There is no significant   stenosis involving the bilateral internal carotid arteries.  Normal   branching pattern is noted of the bilateral external carotid arteries.    There is normal contrast filling bilateral codominant vertebral arteries.      Impression:     Unremarkable CTA of the neck with contrast.      < end of copied text >  < from: CT Angio Head w/ IV Cont (18 @ 11:05) >    Impression:     1.  Unremarkable CTA of the brain with contrast.    2.  If the patient continues to be symptomatic follow-up MRI of the brain   may be helpful for further evaluation.    < end of copied text >        Assessment- Patient with left sided numbness which is improving.  Unclear etiology possibly TIA/CVA vs anxiety    Plan  1. MRI brain MRA Head and Neck  2. ECHO  3. ASA and statin  4. If MRI (-) then no need for KATARZYNA  5. f/u hypercoagulable

## 2018-02-24 NOTE — PROGRESS NOTE ADULT - SUBJECTIVE AND OBJECTIVE BOX
SUBJECTIVE:    Patient is a 23y old Female who presents with a chief complaint of L sided numbness (23 Feb 2018 13:18)    Currently admitted to medicine with the primary diagnosis of Numbness and weakness in left upper extremity and lower extremity.      Today is hospital day 1d. This morning she is resting comfortably in bed and reports no new issues or overnight events.     PAST MEDICAL & SURGICAL HISTORY  Lupus (systemic lupus erythematosus)  History of appendectomy    SOCIAL HISTORY:  Negative for smoking/alcohol/drug use.     ALLERGIES:  ciprofloxacin (Other (Mild))  penicillins (Unknown)    MEDICATIONS:  STANDING MEDICATIONS  aspirin  chewable 81 milliGRAM(s) Oral daily  atorvastatin 80 milliGRAM(s) Oral at bedtime  heparin  Injectable 5000 Unit(s) SubCutaneous every 8 hours  sodium chloride 0.9%. 1000 milliLiter(s) IV Continuous <Continuous>  sodium chloride 0.9%. 1000 milliLiter(s) IV Continuous <Continuous>    PRN MEDICATIONS  acetaminophen   Tablet 650 milliGRAM(s) Oral every 6 hours PRN    VITALS:   T(F): 96.2  HR: 77  BP: 114/72  RR: 18  SpO2: 96%    LABS:                        14.5   8.08  )-----------( 287      ( 23 Feb 2018 09:55 )             42.4     02-23    136  |  103  |  14  ----------------------------<  102  4.0   |  25  |  0.7    Ca    9.3      23 Feb 2018 09:55    TPro  7.4  /  Alb  4.0  /  TBili  0.8  /  DBili  x   /  AST  16  /  ALT  18  /  AlkPhos  95  02-23    PT/INR - ( 23 Feb 2018 09:55 )   PT: 12.40 sec;   INR: 1.14 ratio         PTT - ( 23 Feb 2018 09:55 )  PTT:30.3 sec      Troponin I, Serum: <0.02 ng/mL (02-23-18 @ 09:55)      CARDIAC MARKERS ( 23 Feb 2018 09:55 )  <0.02 ng/mL / x     / x     / x     / x          RADIOLOGY:    < from: CT Head No Cont (02.23.18 @ 10:21) >  1.  No CT evidence of acute intracranial pathology    2.  Paranasal sinus disease as described above.    If the patient continues to be symptomatic follow-up CT scan and/or MRI   of the brain may be helpful for further evaluation.    < end of copied text >    < from: CT Angio Neck w/ IV Cont (02.23.18 @ 11:05) >  Unremarkable CTA of the neck with contrast.    < end of copied text >      PHYSICAL EXAM:  GEN: No acute distress  LUNGS: Clear to auscultation bilaterally   HEART: S1/S2 present. RRR.   ABD: Soft, non-tender, non-distended. Bowel sounds present  EXT: NC/NC/NE/2+PP/FELIZ  NEURO: AAOX3. 5/5 RUE and RLE strength. 4/5 LUE and LLE strength. No dysmetria present. No pronator drift present. Sensation intact. No facial droop present.

## 2018-02-24 NOTE — PROGRESS NOTE ADULT - ASSESSMENT
22 YO F, PMH of Lupus not on medications p/w L sided numbness. Stroke code called in ED but cancelled as Pt was outside of therapeutic window for tPA. Seen by neuro who recommend stroke unit observation and MRI.    # Numbness r/o Stroke   - c/w care in Stroke Unit(Dr Obrien approved)   - CT head wo contrast negative. CTA of head and neck negative.    - MRI Brain w/wo mirian pending.    - TTE bubble study pending.    - f/u Lipid Profile   - f/u HbA1C   - Normal saline 100 ml/hr.   - s/p PT rehab eval - pt able to ambulate without help.      # Lupus (systemic lupus erythematosus)   - Recommend outpatient followup with pt's rheumatologist. .     Code - full  DVT ppx - sq heparin  Dispo - home

## 2018-02-25 ENCOUNTER — TRANSCRIPTION ENCOUNTER (OUTPATIENT)
Age: 23
End: 2018-02-25

## 2018-02-25 VITALS
DIASTOLIC BLOOD PRESSURE: 54 MMHG | RESPIRATION RATE: 18 BRPM | HEART RATE: 87 BPM | SYSTOLIC BLOOD PRESSURE: 101 MMHG | TEMPERATURE: 98 F

## 2018-02-25 LAB
ANION GAP SERPL CALC-SCNC: 8 MMOL/L — SIGNIFICANT CHANGE UP (ref 7–14)
BUN SERPL-MCNC: 12 MG/DL — SIGNIFICANT CHANGE UP (ref 10–20)
CALCIUM SERPL-MCNC: 9 MG/DL — SIGNIFICANT CHANGE UP (ref 8.5–10.1)
CHLORIDE SERPL-SCNC: 103 MMOL/L — SIGNIFICANT CHANGE UP (ref 98–110)
CO2 SERPL-SCNC: 24 MMOL/L — SIGNIFICANT CHANGE UP (ref 17–32)
CREAT SERPL-MCNC: 0.8 MG/DL — SIGNIFICANT CHANGE UP (ref 0.7–1.5)
GLUCOSE SERPL-MCNC: 94 MG/DL — SIGNIFICANT CHANGE UP (ref 70–110)
HCT VFR BLD CALC: 35.8 % — LOW (ref 37–47)
HGB BLD-MCNC: 12.4 G/DL — LOW (ref 14–18)
MCHC RBC-ENTMCNC: 30.3 PG — SIGNIFICANT CHANGE UP (ref 27–31)
MCHC RBC-ENTMCNC: 34.6 G/DL — SIGNIFICANT CHANGE UP (ref 32–37)
MCV RBC AUTO: 87.5 FL — SIGNIFICANT CHANGE UP (ref 81–91)
NRBC # BLD: 0 /100 WBCS — SIGNIFICANT CHANGE UP (ref 0–0)
PLATELET # BLD AUTO: 232 K/UL — SIGNIFICANT CHANGE UP (ref 130–400)
POTASSIUM SERPL-MCNC: 4 MMOL/L — SIGNIFICANT CHANGE UP (ref 3.5–5)
POTASSIUM SERPL-SCNC: 4 MMOL/L — SIGNIFICANT CHANGE UP (ref 3.5–5)
RBC # BLD: 4.09 M/UL — LOW (ref 4.2–5.4)
RBC # FLD: 11.8 % — SIGNIFICANT CHANGE UP (ref 11.5–14.5)
SODIUM SERPL-SCNC: 135 MMOL/L — SIGNIFICANT CHANGE UP (ref 135–146)
WBC # BLD: 8.22 K/UL — SIGNIFICANT CHANGE UP (ref 4.8–10.8)
WBC # FLD AUTO: 8.22 K/UL — SIGNIFICANT CHANGE UP (ref 4.8–10.8)

## 2018-02-25 RX ADMIN — Medication 81 MILLIGRAM(S): at 12:16

## 2018-02-25 RX ADMIN — HEPARIN SODIUM 5000 UNIT(S): 5000 INJECTION INTRAVENOUS; SUBCUTANEOUS at 05:58

## 2018-02-25 NOTE — PROGRESS NOTE ADULT - ASSESSMENT
1. Left sided numbness - now resolved  MRI negative for acute infarct or other pathology  May d/c home today  outpt f/u with neurology    2. SLE - outpt rheumatology f/u    3. All questions answered    4. Case discussed with pt's uncle yesterday with her permission

## 2018-02-25 NOTE — PROGRESS NOTE ADULT - SUBJECTIVE AND OBJECTIVE BOX
Neurology Follow up note    Name  NED RANDALL    HPI:  This is a 22 YO F, PMH Lupus, not taking medications, p/w above CC. She states that she woke this morning at 7:00am with a severe headache, and when she used her hand to rub her face she realized she had no feeling in her left hand. The numbness includes her left UE, LE, face, and left side of her tongue. She also states her left hand was clumsy and she had difficulty getting dressed with that hand. She denies and recent illnesses and states she was feeling well the night prior to going to sleep. Denies any other symptoms, ROS otherwise negative.    VITALS:   Vital Signs Last 24 Hrs  T(C): 35.6 (2018 11:34), Max: 36.5 (2018 09:42)  T(F): 96 (2018 11:34), Max: 97.7 (2018 09:42)  HR: 76 (2018 11:34) (76 - 90)  BP: 100/55 (2018 11:34) (100/55 - 132/70)  BP(mean): --  RR: 18 (2018 11:34) (18 - 18)  SpO2: 98% (2018 11:34) (98% - 98%)  Daily     Daily Weight in k.3 (2018 11:34) (2018 13:18)      Interval History - Symptoms resolved          Vital Signs Last 24 Hrs  T(C): 36.7 (2018 07:20), Max: 36.7 (2018 20:39)  T(F): 98 (2018 07:20), Max: 98 (2018 20:39)  HR: 87 (2018 07:20) (78 - 92)  BP: 101/54 (2018 07:20) (101/54 - 119/66)  BP(mean): --  RR: 18 (2018 07:20) (18 - 19)  SpO2: 98% (2018 11:15) (98% - 98%)          Neurological Exam:   Mental status: Awake, alert and oriented x3.  Recent and remote memory intact.  Naming, repetition and comprehension intact.  Attention/concentration intact.  No dysarthria, no aphasia.  Fund of knowledge appropriate.    Cranial nerves: Fundoscopic exam demonstrated no abnormalities, pupils equally round and reactive to light, visual fields full, no nystagmus, extraocular muscles intact, V1 through V3 intact bilaterally and symmetric, face symmetric, hearing intact to finger rub, palate elevation symmetric, tongue was midline, sternocleidomastoid/shoulder shrug strength bilaterally 5/5.    Motor:  Normal bulk and tone, strength 5/5 in bilateral upper and lower extremities.   strength 5/5.  Rapid alternating movements intact and symmetric.   Sensation: Intact to light touch, proprioception, and pinprick.  No neglect.   Coordination: No dysmetria on finger-to-nose and heel-to-shin.  No clumsiness.  Reflexes: 2+ in upper and lower extremities, downgoing toes bilaterally  Gait: Narrow and steady. No ataxia.  Romberg negative    Medications  acetaminophen   Tablet 650 milliGRAM(s) Oral every 6 hours PRN  aspirin  chewable 81 milliGRAM(s) Oral daily  atorvastatin 80 milliGRAM(s) Oral at bedtime  heparin  Injectable 5000 Unit(s) SubCutaneous every 8 hours  sodium chloride 0.9%. 1000 milliLiter(s) IV Continuous <Continuous>  sodium chloride 0.9%. 1000 milliLiter(s) IV Continuous <Continuous>      Lab                        12.4   8.22  )-----------( 232      ( 2018 06:51 )             35.8       135  |  103  |  12  ----------------------------<  94  4.0   |  24  |  0.8    Ca    9.0      2018 06:51  Mg     2.0             Radiology    < from: MR Head w/wo IV Cont (18 @ 16:35) >  INTERPRETATION:  Clinical History / Reason for exam: Left-sided numbness.   PMH SLE.    TECHNIQUE: MRI brain with and without contrast. Multiplanar   multisequential MRI of the brain was performed before and following the   intravenous administration of OptiMARK intravenous contrast on the 3   Denisha magnet.     Correlation is made with the CT head dated 2018.    FINDINGS:    The ventricles and cortical sulci are normal in size and configuration.      There is no evidence of acute intracranial hemorrhage, extra-axial fluid   collection or midline shift.       There is no evidence for pathologic intracranial enhancement.    There is no diffusion abnormality to suggest acute/subacute infarct.   There is normal flow void present within the major vascular structures.      There is a small polyp or retention cyst within the right maxillary sinus.    IMPRESSION:     Unremarkable MRI of the brain.        < end of copied text >      Assessment- Patient with unclear episode of left sided numbness.  MRI negative    Plan  1. f/u labs as out patient  2. Can stop statin and antiplatelets  3. ok to dc home

## 2018-02-25 NOTE — DISCHARGE NOTE ADULT - CARE PROVIDERS DIRECT ADDRESSES
[email or web address]

## 2018-02-25 NOTE — DISCHARGE NOTE ADULT - PATIENT PORTAL LINK FT
You can access the [facility] Patient Portal, offered by [facility], by registering with the following website: [email or web address]

## 2018-02-25 NOTE — DISCHARGE NOTE ADULT - CARE PROVIDER_API CALL
Fredy Padilla), EEGEpilepsy; Neurology  [address] [facility]  [address]  [place] [address]  Phone: [phone]  Fax: [phone]    Lexie Bunch), Rheumatology  [address] [facility]  [address]  [place] [address]  Phone: [phone]  Fax: [phone]

## 2018-02-25 NOTE — DISCHARGE NOTE ADULT - CARE PLAN
Principal Discharge DX:	Numbness  Goal:	resolution of symptoms  Assessment and plan of treatment:	follow up with neurology  Secondary Diagnosis:	Other forms of systemic lupus erythematosus, unspecified organ involvement status  Goal:	follow up with rheumatology

## 2018-02-25 NOTE — DISCHARGE NOTE ADULT - HOSPITAL COURSE
28 year old woman with SLE (not on meds) presented with left sided numbness.  She was admitted to stroke unit and had CT scan, CTA and MRI of brain - negative for stroke or acute pathology.  2D ECHO WNL. Symptoms now resolved. Pt cleared by neurology for outpt follow up. She will also follow up with rheumatology for outpt follow up.

## 2018-02-25 NOTE — PROGRESS NOTE ADULT - SUBJECTIVE AND OBJECTIVE BOX
NED RANDALL  23y Female    INTERVAL HPI/OVERNIGHT EVENTS:      No numbness.  C/O overall weakness.  Cleared by neuro for outpt f/u    T(F): 98 (02-25-18 @ 07:20), Max: 98 (02-24-18 @ 20:39)  HR: 87 (02-25-18 @ 07:20) (78 - 92)  BP: 101/54 (02-25-18 @ 07:20) (101/54 - 119/66)  RR: 18 (02-25-18 @ 07:20) (18 - 19)    PHYSICAL EXAM:  GENERAL: NAD  HEAD:  Atraumatic, Normocephalic  EYES:  conjunctiva and sclera clear  ENMT: Moist mucous membranes  NECK: Supple  NERVOUS SYSTEM:  Alert & Oriented X3, Good concentration, motor equal b/l, symmetric face  CHEST/LUNG: Clear to percussion bilaterally; No rales, rhonchi, wheezing  HEART: Regular rate and rhythm; No murmurs  ABDOMEN: Soft, Nontender, Nondistended; Bowel sounds present  EXTREMITIES:  No edema  SKIN: No rashes     Consultant(s) Notes Reviewed:  [x ] YES  [ ] NO      Medications reviewed    LABS:                        12.4   8.22  )-----------( 232      ( 25 Feb 2018 06:51 )             35.8     02-25    135  |  103  |  12  ----------------------------<  94  4.0   |  24  |  0.8    Ca    9.0      25 Feb 2018 06:51  Mg     2.0     02-24            RADIOLOGY & ADDITIONAL TESTS:    reports Personally Reviewed:  [x ] YES  [ ] NO    < from: MR Head w/wo IV Cont (02.24.18 @ 16:35) >  IMPRESSION:     Unremarkable MRI of the brain.    < end of copied text >      < from: Transthoracic Echocardiogram (02.24.18 @ 12:27) >  Summary:   1. Left ventricular ejection fraction, by visual estimation, is 60 to   65%.   2. No evidence of patent foramen ovale/atrial septal defect.    < end of copied text >          Care discussed with pt Discharge note    NED RANDALL  23y Female    INTERVAL HPI/OVERNIGHT EVENTS:      No numbness.  C/O overall weakness.  Cleared by neuro for outpt f/u    T(F): 98 (02-25-18 @ 07:20), Max: 98 (02-24-18 @ 20:39)  HR: 87 (02-25-18 @ 07:20) (78 - 92)  BP: 101/54 (02-25-18 @ 07:20) (101/54 - 119/66)  RR: 18 (02-25-18 @ 07:20) (18 - 19)    PHYSICAL EXAM:  GENERAL: NAD  HEAD:  Atraumatic, Normocephalic  EYES:  conjunctiva and sclera clear  ENMT: Moist mucous membranes  NECK: Supple  NERVOUS SYSTEM:  Alert & Oriented X3, Good concentration, motor equal b/l, symmetric face  CHEST/LUNG: Clear to percussion bilaterally; No rales, rhonchi, wheezing  HEART: Regular rate and rhythm; No murmurs  ABDOMEN: Soft, Nontender, Nondistended; Bowel sounds present  EXTREMITIES:  No edema  SKIN: No rashes     Consultant(s) Notes Reviewed:  [x ] YES  [ ] NO      Medications reviewed    LABS:                        12.4   8.22  )-----------( 232      ( 25 Feb 2018 06:51 )             35.8     02-25    135  |  103  |  12  ----------------------------<  94  4.0   |  24  |  0.8    Ca    9.0      25 Feb 2018 06:51  Mg     2.0     02-24            RADIOLOGY & ADDITIONAL TESTS:    reports Personally Reviewed:  [x ] YES  [ ] NO    < from: MR Head w/wo IV Cont (02.24.18 @ 16:35) >  IMPRESSION:     Unremarkable MRI of the brain.    < end of copied text >      < from: Transthoracic Echocardiogram (02.24.18 @ 12:27) >  Summary:   1. Left ventricular ejection fraction, by visual estimation, is 60 to   65%.   2. No evidence of patent foramen ovale/atrial septal defect.    < end of copied text >          Care discussed with pt

## 2018-02-27 DIAGNOSIS — R20.0 ANESTHESIA OF SKIN: ICD-10-CM

## 2018-02-27 DIAGNOSIS — R51 HEADACHE: ICD-10-CM

## 2018-02-27 DIAGNOSIS — Z88.1 ALLERGY STATUS TO OTHER ANTIBIOTIC AGENTS STATUS: ICD-10-CM

## 2018-02-27 DIAGNOSIS — M32.9 SYSTEMIC LUPUS ERYTHEMATOSUS, UNSPECIFIED: ICD-10-CM

## 2018-02-27 DIAGNOSIS — Z88.0 ALLERGY STATUS TO PENICILLIN: ICD-10-CM

## 2018-02-27 LAB
ANA PAT FLD IF-IMP: SIGNIFICANT CHANGE UP
ANA TITR SER: (no result)

## 2018-04-03 ENCOUNTER — EMERGENCY (EMERGENCY)
Facility: HOSPITAL | Age: 23
LOS: 0 days | Discharge: LEFT BEFORE DISCHARGE | End: 2018-04-03
Attending: EMERGENCY MEDICINE | Admitting: PEDIATRICS

## 2018-04-03 VITALS
TEMPERATURE: 101 F | DIASTOLIC BLOOD PRESSURE: 83 MMHG | SYSTOLIC BLOOD PRESSURE: 143 MMHG | OXYGEN SATURATION: 99 % | HEART RATE: 111 BPM | RESPIRATION RATE: 20 BRPM

## 2018-04-03 VITALS
OXYGEN SATURATION: 98 % | DIASTOLIC BLOOD PRESSURE: 77 MMHG | HEART RATE: 135 BPM | SYSTOLIC BLOOD PRESSURE: 143 MMHG | RESPIRATION RATE: 20 BRPM

## 2018-04-03 DIAGNOSIS — Z88.0 ALLERGY STATUS TO PENICILLIN: ICD-10-CM

## 2018-04-03 DIAGNOSIS — R51 HEADACHE: ICD-10-CM

## 2018-04-03 DIAGNOSIS — R20.2 PARESTHESIA OF SKIN: ICD-10-CM

## 2018-04-03 DIAGNOSIS — Z98.890 OTHER SPECIFIED POSTPROCEDURAL STATES: Chronic | ICD-10-CM

## 2018-04-03 DIAGNOSIS — R10.31 RIGHT LOWER QUADRANT PAIN: ICD-10-CM

## 2018-04-03 DIAGNOSIS — R50.9 FEVER, UNSPECIFIED: ICD-10-CM

## 2018-04-03 DIAGNOSIS — Z88.1 ALLERGY STATUS TO OTHER ANTIBIOTIC AGENTS STATUS: ICD-10-CM

## 2018-04-03 DIAGNOSIS — R30.0 DYSURIA: ICD-10-CM

## 2018-04-03 DIAGNOSIS — Z90.49 ACQUIRED ABSENCE OF OTHER SPECIFIED PARTS OF DIGESTIVE TRACT: ICD-10-CM

## 2018-04-03 DIAGNOSIS — R20.0 ANESTHESIA OF SKIN: ICD-10-CM

## 2018-04-03 LAB
ALBUMIN SERPL ELPH-MCNC: 4.7 G/DL — SIGNIFICANT CHANGE UP (ref 3.5–5.2)
ALP SERPL-CCNC: 108 U/L — SIGNIFICANT CHANGE UP (ref 30–115)
ALT FLD-CCNC: 18 U/L — SIGNIFICANT CHANGE UP (ref 0–41)
ANION GAP SERPL CALC-SCNC: 13 MMOL/L — SIGNIFICANT CHANGE UP (ref 7–14)
APPEARANCE UR: CLEAR — SIGNIFICANT CHANGE UP
AST SERPL-CCNC: 16 U/L — SIGNIFICANT CHANGE UP (ref 0–41)
BILIRUB SERPL-MCNC: 0.4 MG/DL — SIGNIFICANT CHANGE UP (ref 0.2–1.2)
BILIRUB UR-MCNC: NEGATIVE — SIGNIFICANT CHANGE UP
BUN SERPL-MCNC: 11 MG/DL — SIGNIFICANT CHANGE UP (ref 10–20)
CALCIUM SERPL-MCNC: 9.2 MG/DL — SIGNIFICANT CHANGE UP (ref 8.5–10.1)
CHLORIDE SERPL-SCNC: 96 MMOL/L — LOW (ref 98–110)
CK SERPL-CCNC: 94 U/L — SIGNIFICANT CHANGE UP (ref 0–225)
CO2 SERPL-SCNC: 25 MMOL/L — SIGNIFICANT CHANGE UP (ref 17–32)
COLOR SPEC: YELLOW — SIGNIFICANT CHANGE UP
CREAT SERPL-MCNC: 0.9 MG/DL — SIGNIFICANT CHANGE UP (ref 0.7–1.5)
DIFF PNL FLD: NEGATIVE — SIGNIFICANT CHANGE UP
GLUCOSE SERPL-MCNC: 99 MG/DL — SIGNIFICANT CHANGE UP (ref 70–99)
GLUCOSE UR QL: NEGATIVE MG/DL — SIGNIFICANT CHANGE UP
HCT VFR BLD CALC: 42.6 % — SIGNIFICANT CHANGE UP (ref 37–47)
HGB BLD-MCNC: 14.7 G/DL — SIGNIFICANT CHANGE UP (ref 12–16)
KETONES UR-MCNC: NEGATIVE — SIGNIFICANT CHANGE UP
LEUKOCYTE ESTERASE UR-ACNC: NEGATIVE — SIGNIFICANT CHANGE UP
MCHC RBC-ENTMCNC: 30.5 PG — SIGNIFICANT CHANGE UP (ref 27–31)
MCHC RBC-ENTMCNC: 34.5 G/DL — SIGNIFICANT CHANGE UP (ref 32–37)
MCV RBC AUTO: 88.4 FL — SIGNIFICANT CHANGE UP (ref 81–99)
NITRITE UR-MCNC: NEGATIVE — SIGNIFICANT CHANGE UP
NRBC # BLD: 0 /100 WBCS — SIGNIFICANT CHANGE UP (ref 0–0)
PH UR: 7.5 — SIGNIFICANT CHANGE UP (ref 5–8)
PLATELET # BLD AUTO: 198 K/UL — SIGNIFICANT CHANGE UP (ref 130–400)
POTASSIUM SERPL-MCNC: 4.3 MMOL/L — SIGNIFICANT CHANGE UP (ref 3.5–5)
POTASSIUM SERPL-SCNC: 4.3 MMOL/L — SIGNIFICANT CHANGE UP (ref 3.5–5)
PROT SERPL-MCNC: 8 G/DL — SIGNIFICANT CHANGE UP (ref 6–8)
PROT UR-MCNC: NEGATIVE MG/DL — SIGNIFICANT CHANGE UP
RBC # BLD: 4.82 M/UL — SIGNIFICANT CHANGE UP (ref 4.2–5.4)
RBC # FLD: 11.7 % — SIGNIFICANT CHANGE UP (ref 11.5–14.5)
SODIUM SERPL-SCNC: 134 MMOL/L — LOW (ref 135–146)
SP GR SPEC: 1.02 — SIGNIFICANT CHANGE UP (ref 1.01–1.03)
TROPONIN T SERPL-MCNC: <0.01 NG/ML — SIGNIFICANT CHANGE UP
UROBILINOGEN FLD QL: 0.2 MG/DL — SIGNIFICANT CHANGE UP (ref 0.2–0.2)
WBC # BLD: 7.47 K/UL — SIGNIFICANT CHANGE UP (ref 4.8–10.8)
WBC # FLD AUTO: 7.47 K/UL — SIGNIFICANT CHANGE UP (ref 4.8–10.8)

## 2018-04-03 RX ORDER — SODIUM CHLORIDE 9 MG/ML
1 INJECTION INTRAMUSCULAR; INTRAVENOUS; SUBCUTANEOUS ONCE
Qty: 0 | Refills: 0 | Status: COMPLETED | OUTPATIENT
Start: 2018-04-03 | End: 2018-04-03

## 2018-04-03 RX ORDER — METOCLOPRAMIDE HCL 10 MG
10 TABLET ORAL ONCE
Qty: 0 | Refills: 0 | Status: COMPLETED | OUTPATIENT
Start: 2018-04-03 | End: 2018-04-03

## 2018-04-03 RX ORDER — ACETAMINOPHEN 500 MG
500 TABLET ORAL ONCE
Qty: 0 | Refills: 0 | Status: COMPLETED | OUTPATIENT
Start: 2018-04-03 | End: 2018-04-03

## 2018-04-03 RX ADMIN — Medication 10 MILLIGRAM(S): at 16:18

## 2018-04-03 RX ADMIN — Medication 500 MILLIGRAM(S): at 17:11

## 2018-04-03 RX ADMIN — SODIUM CHLORIDE 1 MILLILITER(S): 9 INJECTION INTRAMUSCULAR; INTRAVENOUS; SUBCUTANEOUS at 16:17

## 2018-04-03 NOTE — ED PROVIDER NOTE - PHYSICAL EXAMINATION
CONSTITUTIONAL: Well-developed; states she has fever and chills.   SKIN: warm, dry  HEAD: Normocephalic; atraumatic.  EYES: no conj injection  ENT: No nasal discharge; airway clear.  NECK: Supple; non tender.  CARD: no murmur, tachycardic  RESP: clear breath sounds, no wheezing  ABD: tenderness/pain RLQ  EXT: Normal ROM. weakness RLE and LLE.  LYMPH: No acute cervical adenopathy.  NEURO: Alert, oriented, tingling and numbness LLE and LUE  PSYCH: Cooperative, appropriate.

## 2018-04-03 NOTE — ED PROVIDER NOTE - ATTENDING CONTRIBUTION TO CARE
23 year old female, pmhx of chronic lue and lle numbness and neuro changes, started in february, had a stroke code activation then and advanced imaging and was evaluated by neurology, comes in with complaint of headache, dull left sided, + urinary symptoms + burning on urination, no vaginal bleeding or discharge.     Exam: Well appearing, no acute distress, AAO x 3, sitting up and providing history, EOMI, PERRL 3mm bilateral, no nystagmus, HEENT Unremarkable, + moist mucous membranes, no pooling of secretions, no jvd, + full passive rom in neck, negative Kernig, negative Brudzinski, s1s2, no mrg, rrr, + symmetric bilateral pulses, ctabl, no wrr, good air movement overall, no pulsatile abdominal mass, abd soft, nt nd, no rebound, no guarding, no signs of peritonitis, no cva tenderness, no rash, no leg edema, dp and pt pulses intact. No calf pain, swelling or erythema, Ambulatory. Strength intact symmetrically. CN 2-12 grossly intact, GCS 15. P: labs, imaging, tx, reassess 23 year old female, pmhx of chronic lue and lle numbness and neuro changes, started in february, had a stroke code activation then and advanced imaging and was evaluated by neurology, comes in with complaint of headache, dull left sided, + urinary symptoms + burning on urination, no vaginal bleeding or discharge. Patient states that she has a history of appendectomy    Exam: Well appearing, no acute distress, AAO x 3, sitting up and providing history, EOMI, PERRL 3mm bilateral, no nystagmus, HEENT Unremarkable, + moist mucous membranes, no pooling of secretions, no jvd, + full passive rom in neck, negative Kernig, negative Brudzinski, s1s2, no mrg, rrr, + symmetric bilateral pulses, ctabl, no wrr, good air movement overall, no pulsatile abdominal mass, abd + Right lower pelvic tenderness, nd, no rebound, no guarding, no signs of peritonitis, no cva tenderness, no rash, no leg edema, dp and pt pulses intact. No calf pain, swelling or erythema, Ambulatory. Strength intact symmetrically. CN 2-12 grossly intact, GCS 15. P: labs, imaging, tx, reassess

## 2018-04-03 NOTE — ED PROVIDER NOTE - OBJECTIVE STATEMENT
24 y/o female with PMH of stroke like symptoms and appendectomy presented to ED for left sided headache, tingling numbness LUE and LLE with weakness, fever, chills, RLQ pain, burning sensation with urination. Pt said that she was admitted recently about a month ago and was admitted for stroke like symptoms. She said the symptoms did not get better since discharge.

## 2018-04-03 NOTE — ED PROVIDER NOTE - NS ED ROS FT
Constitutional: (+) fever, (+) chills  Eyes/ENT: (-) blurry vision, (-) epistaxis, (-) sore throat  Cardiovascular: (-) chest pain, (-) syncope  Respiratory: (-) cough, (-) shortness of breath  Gastrointestinal: (+) abdominal pain, (-) vomiting, (-) diarrhea  Musculoskeletal: (-) neck pain, (-) back pain, (-) joint pain  Integumentary: (-) rash, (-) edema  Neurological: (+) left sided headache, (-) altered mental status. LLE and LUE tingling and numbness with some pain  Psychiatric: (-) hallucinations,   Allergic/Immunologic: (-) pruritus

## 2018-04-03 NOTE — ED PROVIDER NOTE - PROGRESS NOTE DETAILS
Pt declines Lumbar puncture offer. Discussed risks and benefits given pt unknown source of fever. Pt awake alert. Pt decline inpatient stay for further work up. Patient offered LP and declined. Patient offered admission and declined. The patient wishes to leave against medical advice.  I have discussed the risks, benefits and alternatives (including the possibility of worsening of disease, pain, permanent disability, and/or death) with the patient and his/her family (if available).  The patient voices understanding of these risks, benefits, and alternatives and still wishes to sign out against medical advice.  The patient is awake, alert, oriented  x 3 and has demonstrated capacity to refuse/direct care.  I have advised the patient that they can and should return immediately should they develop any worse/different/additional symptoms, or if they change their mind and want to continue their care. I have full discussed the medical management and delivery of care with the patient. Patient confirms understanding and has been given detailed return precautions. Patient instructed to return to the ED should symptoms persist or worsen. Patient is well appearing upon discharge. Family and friends bedside

## 2018-04-03 NOTE — ED ADULT NURSE NOTE - OBJECTIVE STATEMENT
Patient appears anxious stating that she can not breathe, and that she has a bad headache with numbness to the left side of her head

## 2018-12-26 ENCOUNTER — EMERGENCY (EMERGENCY)
Facility: HOSPITAL | Age: 23
LOS: 0 days | Discharge: HOME | End: 2018-12-27
Attending: EMERGENCY MEDICINE | Admitting: EMERGENCY MEDICINE

## 2018-12-26 VITALS — HEIGHT: 69 IN | WEIGHT: 227.08 LBS

## 2018-12-26 VITALS
HEART RATE: 79 BPM | OXYGEN SATURATION: 99 % | TEMPERATURE: 96 F | RESPIRATION RATE: 18 BRPM | SYSTOLIC BLOOD PRESSURE: 123 MMHG | DIASTOLIC BLOOD PRESSURE: 73 MMHG
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
## 2018-12-26 DIAGNOSIS — Z88.1 ALLERGY STATUS TO OTHER ANTIBIOTIC AGENTS STATUS: ICD-10-CM

## 2018-12-26 DIAGNOSIS — Z88.0 ALLERGY STATUS TO PENICILLIN: ICD-10-CM

## 2018-12-26 DIAGNOSIS — Z98.890 OTHER SPECIFIED POSTPROCEDURAL STATES: Chronic | ICD-10-CM
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
## 2018-12-26 DIAGNOSIS — R30.0 DYSURIA: ICD-10-CM

## 2018-12-26 DIAGNOSIS — K64.4 RESIDUAL HEMORRHOIDAL SKIN TAGS: ICD-10-CM

## 2018-12-26 DIAGNOSIS — Z79.84 LONG TERM (CURRENT) USE OF ORAL HYPOGLYCEMIC DRUGS: ICD-10-CM

## 2018-12-26 DIAGNOSIS — K62.5 HEMORRHAGE OF ANUS AND RECTUM: ICD-10-CM

## 2018-12-26 DIAGNOSIS — M25.522 PAIN IN LEFT ELBOW: ICD-10-CM

## 2018-12-26 DIAGNOSIS — Z90.49 ACQUIRED ABSENCE OF OTHER SPECIFIED PARTS OF DIGESTIVE TRACT: ICD-10-CM
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
## 2018-12-26 RX ORDER — ACETAMINOPHEN 500 MG
650 TABLET ORAL ONCE
Qty: 0 | Refills: 0 | Status: COMPLETED | OUTPATIENT
Start: 2018-12-26 | End: 2018-12-26

## 2018-12-26 RX ADMIN — Medication 650 MILLIGRAM(S): at 23:49

## 2018-12-26 NOTE — ED ADULT TRIAGE NOTE - CHIEF COMPLAINT QUOTE
I'm bleeding from my rectum, also I need an X-ray for my elbow, I fell on ice about a week ago - patient

## 2018-12-26 NOTE — ED PROVIDER NOTE - PHYSICAL EXAMINATION
Vital Signs: I have reviewed the initial vital signs.  Constitutional: NAD, well-nourished, appears stated age, no acute distress.  HEENT: Airway patent, moist MM, no erythema/swelling/deformity of oral structures. EOMI, PERRLA.  CV: regular rate, regular rhythm, well-perfused extremities, 2+ b/l DP and radial pulses equal.  Lungs: BCTA, no increased WOB.  ABD: NTND, no guarding or rebound, no pulsatile mass, no hernias. discomfort with suprapubic palpation  MSK: Neck supple, nontender, nl ROM, no stepoff. Chest nontender. Back nontender in TLS spine or to b/l bony structures or flanks. Ext nontender, nl rom, no deformity.   INTEG: Skin warm, dry, no rash.  NEURO: A&Ox3, moving all extremities, normal speech  PSYCH: anxious, cooperative, normal affect and interaction.

## 2018-12-26 NOTE — ED ADULT NURSE NOTE - NSIMPLEMENTINTERV_GEN_ALL_ED
Implemented All Universal Safety Interventions:  [place] to call system. Call bell, personal items and telephone within reach. Instruct patient to call for assistance. Room bathroom lighting operational. Non-slip footwear when patient is off stretcher. Physically safe environment: no spills, clutter or unnecessary equipment. Stretcher in lowest position, wheels locked, appropriate side rails in place.

## 2018-12-26 NOTE — ED PROVIDER NOTE - ATTENDING CONTRIBUTION TO CARE
22 yo c/o rectal bleeding 2 days. no ab pain. no n, v, d. no fever or chills. also c/o left elbow pain after fall on ice. not on anti coag. had colonoscopy this year and was normal.  pt in nad, pink conj, neck sup, ctab, rrr, ab soft, nt, nd. +ext hemorrhoid.  gross blood on rectal exam. no focal def. skin nml.  will check labs, ua, xray elbow.

## 2018-12-26 NOTE — ED PROVIDER NOTE - OBJECTIVE STATEMENT
23yF with PMH 23yF with PMH SLE, PCOS, hemorrhoids p/w rectal bleeding X 2 days a/w small clots on toilet paper. blood is on outside of stool and in toilet bowel, small volume, a/w pain on defecation.     LMP Nov 28. Pt had normal endoscopy/colonoscopy with Dr. Wilson 8 mo ago. no hx clotting disorder. no blood thinners. pt also endorses dysuria but no blood in urine.

## 2018-12-27 LAB
ALBUMIN SERPL ELPH-MCNC: 4.3 G/DL — SIGNIFICANT CHANGE UP (ref 3.5–5.2)
ALP SERPL-CCNC: 96 U/L — SIGNIFICANT CHANGE UP (ref 30–115)
ALT FLD-CCNC: 20 U/L — SIGNIFICANT CHANGE UP (ref 0–41)
ANION GAP SERPL CALC-SCNC: 12 MMOL/L — SIGNIFICANT CHANGE UP (ref 7–14)
APPEARANCE UR: ABNORMAL
AST SERPL-CCNC: 14 U/L — SIGNIFICANT CHANGE UP (ref 0–41)
BACTERIA # UR AUTO: ABNORMAL /HPF
BASOPHILS # BLD AUTO: 0.04 K/UL — SIGNIFICANT CHANGE UP (ref 0–0.2)
BASOPHILS NFR BLD AUTO: 0.5 % — SIGNIFICANT CHANGE UP (ref 0–1)
BILIRUB SERPL-MCNC: 0.2 MG/DL — SIGNIFICANT CHANGE UP (ref 0.2–1.2)
BILIRUB UR-MCNC: NEGATIVE — SIGNIFICANT CHANGE UP
BUN SERPL-MCNC: 15 MG/DL — SIGNIFICANT CHANGE UP (ref 10–20)
CALCIUM SERPL-MCNC: 9.3 MG/DL — SIGNIFICANT CHANGE UP (ref 8.5–10.1)
CHLORIDE SERPL-SCNC: 101 MMOL/L — SIGNIFICANT CHANGE UP (ref 98–110)
CO2 SERPL-SCNC: 26 MMOL/L — SIGNIFICANT CHANGE UP (ref 17–32)
COLOR SPEC: YELLOW — SIGNIFICANT CHANGE UP
CREAT SERPL-MCNC: 0.8 MG/DL — SIGNIFICANT CHANGE UP (ref 0.7–1.5)
DIFF PNL FLD: NEGATIVE — SIGNIFICANT CHANGE UP
EOSINOPHIL # BLD AUTO: 0.33 K/UL — SIGNIFICANT CHANGE UP (ref 0–0.7)
EOSINOPHIL NFR BLD AUTO: 4.2 % — SIGNIFICANT CHANGE UP (ref 0–8)
GLUCOSE SERPL-MCNC: 93 MG/DL — SIGNIFICANT CHANGE UP (ref 70–99)
GLUCOSE UR QL: NEGATIVE MG/DL — SIGNIFICANT CHANGE UP
HCT VFR BLD CALC: 40.6 % — SIGNIFICANT CHANGE UP (ref 37–47)
HGB BLD-MCNC: 14 G/DL — SIGNIFICANT CHANGE UP (ref 12–16)
IMM GRANULOCYTES NFR BLD AUTO: 0.1 % — SIGNIFICANT CHANGE UP (ref 0.1–0.3)
KETONES UR-MCNC: NEGATIVE — SIGNIFICANT CHANGE UP
LEUKOCYTE ESTERASE UR-ACNC: ABNORMAL
LIDOCAIN IGE QN: 27 U/L — SIGNIFICANT CHANGE UP (ref 7–60)
LYMPHOCYTES # BLD AUTO: 2.73 K/UL — SIGNIFICANT CHANGE UP (ref 1.2–3.4)
LYMPHOCYTES # BLD AUTO: 34.3 % — SIGNIFICANT CHANGE UP (ref 20.5–51.1)
MCHC RBC-ENTMCNC: 30.6 PG — SIGNIFICANT CHANGE UP (ref 27–31)
MCHC RBC-ENTMCNC: 34.5 G/DL — SIGNIFICANT CHANGE UP (ref 32–37)
MCV RBC AUTO: 88.8 FL — SIGNIFICANT CHANGE UP (ref 81–99)
MONOCYTES # BLD AUTO: 0.95 K/UL — HIGH (ref 0.1–0.6)
MONOCYTES NFR BLD AUTO: 11.9 % — HIGH (ref 1.7–9.3)
NEUTROPHILS # BLD AUTO: 3.89 K/UL — SIGNIFICANT CHANGE UP (ref 1.4–6.5)
NEUTROPHILS NFR BLD AUTO: 49 % — SIGNIFICANT CHANGE UP (ref 42.2–75.2)
NITRITE UR-MCNC: NEGATIVE — SIGNIFICANT CHANGE UP
NRBC # BLD: 0 /100 WBCS — SIGNIFICANT CHANGE UP (ref 0–0)
PH UR: 7 — SIGNIFICANT CHANGE UP (ref 5–8)
PLATELET # BLD AUTO: 217 K/UL — SIGNIFICANT CHANGE UP (ref 130–400)
POTASSIUM SERPL-MCNC: 4.5 MMOL/L — SIGNIFICANT CHANGE UP (ref 3.5–5)
POTASSIUM SERPL-SCNC: 4.5 MMOL/L — SIGNIFICANT CHANGE UP (ref 3.5–5)
PROT SERPL-MCNC: 7.8 G/DL — SIGNIFICANT CHANGE UP (ref 6–8)
PROT UR-MCNC: ABNORMAL MG/DL
RBC # BLD: 4.57 M/UL — SIGNIFICANT CHANGE UP (ref 4.2–5.4)
RBC # FLD: 11.6 % — SIGNIFICANT CHANGE UP (ref 11.5–14.5)
SODIUM SERPL-SCNC: 139 MMOL/L — SIGNIFICANT CHANGE UP (ref 135–146)
SP GR SPEC: 1.02 — SIGNIFICANT CHANGE UP (ref 1.01–1.03)
UROBILINOGEN FLD QL: 0.2 MG/DL — SIGNIFICANT CHANGE UP (ref 0.2–0.2)
WBC # BLD: 7.95 K/UL — SIGNIFICANT CHANGE UP (ref 4.8–10.8)
WBC # FLD AUTO: 7.95 K/UL — SIGNIFICANT CHANGE UP (ref 4.8–10.8)
WBC UR QL: SIGNIFICANT CHANGE UP /HPF

## 2019-02-12 ENCOUNTER — EMERGENCY (EMERGENCY)
Facility: HOSPITAL | Age: 24
LOS: 0 days | Discharge: HOME | End: 2019-02-13
Attending: EMERGENCY MEDICINE | Admitting: EMERGENCY MEDICINE

## 2019-02-12 VITALS
RESPIRATION RATE: 20 BRPM | HEART RATE: 87 BPM | SYSTOLIC BLOOD PRESSURE: 115 MMHG | DIASTOLIC BLOOD PRESSURE: 77 MMHG | OXYGEN SATURATION: 99 %

## 2019-02-12 DIAGNOSIS — Z88.0 ALLERGY STATUS TO PENICILLIN: ICD-10-CM

## 2019-02-12 DIAGNOSIS — Z90.49 ACQUIRED ABSENCE OF OTHER SPECIFIED PARTS OF DIGESTIVE TRACT: ICD-10-CM

## 2019-02-12 DIAGNOSIS — Z98.890 OTHER SPECIFIED POSTPROCEDURAL STATES: Chronic | ICD-10-CM

## 2019-02-12 DIAGNOSIS — Z79.84 LONG TERM (CURRENT) USE OF ORAL HYPOGLYCEMIC DRUGS: ICD-10-CM

## 2019-02-12 DIAGNOSIS — Z79.1 LONG TERM (CURRENT) USE OF NON-STEROIDAL ANTI-INFLAMMATORIES (NSAID): ICD-10-CM

## 2019-02-12 DIAGNOSIS — N30.90 CYSTITIS, UNSPECIFIED WITHOUT HEMATURIA: ICD-10-CM

## 2019-02-12 DIAGNOSIS — F41.9 ANXIETY DISORDER, UNSPECIFIED: ICD-10-CM

## 2019-02-12 DIAGNOSIS — Z87.19 PERSONAL HISTORY OF OTHER DISEASES OF THE DIGESTIVE SYSTEM: ICD-10-CM

## 2019-02-12 DIAGNOSIS — N93.9 ABNORMAL UTERINE AND VAGINAL BLEEDING, UNSPECIFIED: ICD-10-CM

## 2019-02-12 DIAGNOSIS — Z88.1 ALLERGY STATUS TO OTHER ANTIBIOTIC AGENTS STATUS: ICD-10-CM

## 2019-02-12 LAB
ALBUMIN SERPL ELPH-MCNC: 4.5 G/DL — SIGNIFICANT CHANGE UP (ref 3.5–5.2)
ALP SERPL-CCNC: 93 U/L — SIGNIFICANT CHANGE UP (ref 30–115)
ALT FLD-CCNC: 20 U/L — SIGNIFICANT CHANGE UP (ref 0–41)
ANION GAP SERPL CALC-SCNC: 19 MMOL/L — HIGH (ref 7–14)
AST SERPL-CCNC: 17 U/L — SIGNIFICANT CHANGE UP (ref 0–41)
BILIRUB SERPL-MCNC: 0.2 MG/DL — SIGNIFICANT CHANGE UP (ref 0.2–1.2)
BLD GP AB SCN SERPL QL: SIGNIFICANT CHANGE UP
BUN SERPL-MCNC: 13 MG/DL — SIGNIFICANT CHANGE UP (ref 10–20)
CALCIUM SERPL-MCNC: 9.5 MG/DL — SIGNIFICANT CHANGE UP (ref 8.5–10.1)
CHLORIDE SERPL-SCNC: 99 MMOL/L — SIGNIFICANT CHANGE UP (ref 98–110)
CO2 SERPL-SCNC: 22 MMOL/L — SIGNIFICANT CHANGE UP (ref 17–32)
CREAT SERPL-MCNC: 0.8 MG/DL — SIGNIFICANT CHANGE UP (ref 0.7–1.5)
GLUCOSE SERPL-MCNC: 88 MG/DL — SIGNIFICANT CHANGE UP (ref 70–99)
HCG SERPL-ACNC: <0.6 MIU/ML — SIGNIFICANT CHANGE UP
HCT VFR BLD CALC: 41 % — SIGNIFICANT CHANGE UP (ref 37–47)
HGB BLD-MCNC: 14 G/DL — SIGNIFICANT CHANGE UP (ref 12–16)
MCHC RBC-ENTMCNC: 30.2 PG — SIGNIFICANT CHANGE UP (ref 27–31)
MCHC RBC-ENTMCNC: 34.1 G/DL — SIGNIFICANT CHANGE UP (ref 32–37)
MCV RBC AUTO: 88.6 FL — SIGNIFICANT CHANGE UP (ref 81–99)
NRBC # BLD: 0 /100 WBCS — SIGNIFICANT CHANGE UP (ref 0–0)
PLATELET # BLD AUTO: 230 K/UL — SIGNIFICANT CHANGE UP (ref 130–400)
POTASSIUM SERPL-MCNC: 4.1 MMOL/L — SIGNIFICANT CHANGE UP (ref 3.5–5)
POTASSIUM SERPL-SCNC: 4.1 MMOL/L — SIGNIFICANT CHANGE UP (ref 3.5–5)
PROT SERPL-MCNC: 7.6 G/DL — SIGNIFICANT CHANGE UP (ref 6–8)
RBC # BLD: 4.63 M/UL — SIGNIFICANT CHANGE UP (ref 4.2–5.4)
RBC # FLD: 11.7 % — SIGNIFICANT CHANGE UP (ref 11.5–14.5)
SODIUM SERPL-SCNC: 140 MMOL/L — SIGNIFICANT CHANGE UP (ref 135–146)
TYPE + AB SCN PNL BLD: SIGNIFICANT CHANGE UP
WBC # BLD: 12.1 K/UL — HIGH (ref 4.8–10.8)
WBC # FLD AUTO: 12.1 K/UL — HIGH (ref 4.8–10.8)

## 2019-02-12 RX ORDER — SODIUM CHLORIDE 9 MG/ML
1000 INJECTION INTRAMUSCULAR; INTRAVENOUS; SUBCUTANEOUS
Qty: 0 | Refills: 0 | Status: DISCONTINUED | OUTPATIENT
Start: 2019-02-12 | End: 2019-02-13

## 2019-02-12 NOTE — ED ADULT NURSE NOTE - INTERVENTIONS DEFINITIONS
Call bell, personal items and telephone within reach/Instruct patient to call for assistance/Physically safe environment: no spills, clutter or unnecessary equipment

## 2019-02-13 VITALS
DIASTOLIC BLOOD PRESSURE: 67 MMHG | OXYGEN SATURATION: 100 % | HEART RATE: 80 BPM | SYSTOLIC BLOOD PRESSURE: 115 MMHG | TEMPERATURE: 97 F | RESPIRATION RATE: 18 BRPM

## 2019-02-13 PROBLEM — K37 UNSPECIFIED APPENDICITIS: Chronic | Status: ACTIVE | Noted: 2018-12-26

## 2019-02-13 LAB
APPEARANCE UR: ABNORMAL
BACTERIA # UR AUTO: ABNORMAL /HPF
BILIRUB UR-MCNC: ABNORMAL
COLOR SPEC: ABNORMAL
DIFF PNL FLD: ABNORMAL
GLUCOSE UR QL: 100 MG/DL
KETONES UR-MCNC: 40
LEUKOCYTE ESTERASE UR-ACNC: ABNORMAL
NITRITE UR-MCNC: POSITIVE
PH UR: 5.5 — SIGNIFICANT CHANGE UP (ref 5–8)
PROT UR-MCNC: >=300
RBC CASTS # UR COMP ASSIST: >50 /HPF
SP GR SPEC: >=1.03 — SIGNIFICANT CHANGE UP (ref 1.01–1.03)
UROBILINOGEN FLD QL: 1 (ref 0.2–0.2)
WBC UR QL: >50 /HPF

## 2019-02-13 RX ORDER — PHENAZOPYRIDINE HCL 100 MG
200 TABLET ORAL ONCE
Qty: 0 | Refills: 0 | Status: COMPLETED | OUTPATIENT
Start: 2019-02-13 | End: 2019-02-13

## 2019-02-13 RX ORDER — CEFTRIAXONE 500 MG/1
INJECTION, POWDER, FOR SOLUTION INTRAMUSCULAR; INTRAVENOUS
Qty: 0 | Refills: 0 | Status: DISCONTINUED | OUTPATIENT
Start: 2019-02-13 | End: 2019-02-13

## 2019-02-13 RX ORDER — PHENAZOPYRIDINE HCL 100 MG
1 TABLET ORAL
Qty: 4 | Refills: 0
Start: 2019-02-13 | End: 2019-02-14

## 2019-02-13 RX ORDER — CEFPODOXIME PROXETIL 100 MG
1 TABLET ORAL
Qty: 20 | Refills: 0
Start: 2019-02-13 | End: 2019-02-22

## 2019-02-13 RX ORDER — CEFTRIAXONE 500 MG/1
1 INJECTION, POWDER, FOR SOLUTION INTRAMUSCULAR; INTRAVENOUS ONCE
Qty: 0 | Refills: 0 | Status: COMPLETED | OUTPATIENT
Start: 2019-02-13 | End: 2019-02-13

## 2019-02-13 RX ADMIN — SODIUM CHLORIDE 1000 MILLILITER(S): 9 INJECTION INTRAMUSCULAR; INTRAVENOUS; SUBCUTANEOUS at 01:39

## 2019-02-13 RX ADMIN — SODIUM CHLORIDE 125 MILLILITER(S): 9 INJECTION INTRAMUSCULAR; INTRAVENOUS; SUBCUTANEOUS at 00:07

## 2019-02-13 RX ADMIN — CEFTRIAXONE 1 GRAM(S): 500 INJECTION, POWDER, FOR SOLUTION INTRAMUSCULAR; INTRAVENOUS at 01:39

## 2019-02-13 RX ADMIN — CEFTRIAXONE 100 GRAM(S): 500 INJECTION, POWDER, FOR SOLUTION INTRAMUSCULAR; INTRAVENOUS at 01:05

## 2019-02-13 RX ADMIN — Medication 200 MILLIGRAM(S): at 01:39

## 2019-02-13 NOTE — ED PROVIDER NOTE - CLINICAL SUMMARY MEDICAL DECISION MAKING FREE TEXT BOX
23yo woman with "vaginal bleeding" and no blood on pelvic exam and normal labs, sono has hemorrhagic cystitis on UA, doubt ascending infection as no constitutional sx. Educated re relationship of sex to UTI, rec post-coital urination (pt has had 2 UTIs in the past couple of months). Gave ceftriaxone and d/c with cefpodoxime, pyridium, PMD f/u; pt understands that if sx do no resolve she will need urology f/u.

## 2019-02-13 NOTE — ED PROVIDER NOTE - NSFOLLOWUPINSTRUCTIONS_ED_ALL_ED_FT
1. take antibiotics as prescribed  2. increase fluids  3. follow up primary doctor  4. return to the er for worsening symptoms such as fever, vomiting, or other problems    Urinary Tract Infection    A urinary tract infection (UTI) is an infection of any part of the urinary tract, which includes the kidneys, ureters, bladder, and urethra. Risk factors include ignoring your need to urinate, wiping back to front if female, being an uncircumcised male, and having diabetes or a weak immune system. Symptoms include frequent urination, pain or burning with urination, foul smelling urine, cloudy urine, pain in the lower abdomen, blood in the urine, and fever. If you were prescribed an antibiotic medicine, take it as told by your health care provider. Do not stop taking the antibiotic even if you start to feel better.    SEEK IMMEDIATE MEDICAL CARE IF YOU HAVE ANY OF THE FOLLOWING SYMPTOMS: severe back or abdominal pain, fever, inability to keep fluids or medicine down, dizziness/lightheadedness, or a change in mental status.

## 2019-02-13 NOTE — ED PROVIDER NOTE - CARE PROVIDER_API CALL
Krystian Hurd)  Obstetrics and Gynecology  [address] [place] [address]  Phone: [phone]  Fax: [phone]  Follow Up Time:

## 2019-02-13 NOTE — ED PROVIDER NOTE - PHYSICAL EXAMINATION
VITAL SIGNS: I have reviewed nursing notes and confirm.  CONSTITUTIONAL: Well-developed; well-nourished; anxious but nontoxic appearing  SKIN: Skin exam is warm and dry, no acute rash.  HEAD: Normocephalic; atraumatic.  EYES: PERRL, EOM intact; conjunctiva and sclera clear.  ENT: No nasal discharge; airway clear. TMs clear.  NECK: Supple; non tender.  CARD: S1, S2 normal; no murmurs, gallops, or rubs. Regular rate and rhythm.  RESP: No wheezes, rales or rhonchi.  ABD: soft, NT, mild suprapubic tenderness  EXT: Normal ROM. No clubbing, cyanosis or edema.  LYMPH: No acute cervical adenopathy.  NEURO: Alert, oriented. Grossly unremarkable. No focal deficits.  PSYCH: Cooperative, appropriate.  PELVIC: External genitalia appear raw (pt reports increased frequency of intercourse while on vacation recently) but no lesions. Trace d/c in the vault, no bleeding, no CMT, no adnexal tenderness

## 2019-02-13 NOTE — ED PROVIDER NOTE - OBJECTIVE STATEMENT
25yo woman  (SAB 1 year ago) c/o vaginal bleeding that began 4hours PTA. Pt reports that she had period mid January, not due yet, typically bleeding with menses is light but today it has clots. Mild pelvic cramping, dysuria, no nausea, vomiting. Used 2 pads PTA. Never has had this kind of bleeding before. Sexually active with , no protection because trying to get pregnant. No fever, chills, back pain.

## 2019-02-13 NOTE — ED PROVIDER NOTE - NS ED ROS FT
Constitutional: (-) fever  Eyes/ENT: (-) blurry vision, (-) epistaxis  Cardiovascular: (-) chest pain, (-) syncope  Respiratory: (-) cough, (-) shortness of breath  Gastrointestinal: (-) vomiting, (-) diarrhea  Musculoskeletal: (-) neck pain, (-) back pain, (-) joint pain  : SEE HPI  Integumentary: (-) rash, (-) edema  Neurological: (-) headache, (-) altered mental status  Psychiatric: (-) hallucinations  Allergic/Immunologic: (-) pruritus

## 2019-09-18 NOTE — ED PROVIDER NOTE - TOBACCO USE
2019    Da Dunn (:  1965) is a 47 y.o. male, here for evaluation of the following medical concerns: hypertension    HPI     47year old  male  Right handed  Children 3 all adults and step children. Education: hs graduate and associate degree in automotive and diesel technology  Non smoker, no chew tobacco  Alcohol use 1 beer  Rarely. No hx drug use. Occupation: [employer] [address]  31 years. Days most of his time. 8 hour days.  does also. Patient with working most of the day. Large meal at end of the day, lunch something from machine. 1 pop at per day can/bottle-pepsi/sprite  Water taken small amount. Large milk drinker- 48 ounces daily  Coffee 1-2 daily. Family hx completed and maternal hx thyroid disease. Review of Systems   Constitutional: Negative for activity change, appetite change, chills and fever. HENT: Negative for congestion, postnasal drip, rhinorrhea and sore throat. Eyes: Negative. Wears contacts   Respiratory: Negative for cough, shortness of breath and wheezing. Cardiovascular: Negative for chest pain, palpitations and leg swelling. Gastrointestinal: Negative for abdominal distention, abdominal pain, constipation, diarrhea and rectal pain. Genitourinary: Negative for difficulty urinating and flank pain. Musculoskeletal: Negative for arthralgias, back pain and gait problem. Skin: Negative for rash. Neurological: Negative for dizziness, tremors and headaches. Psychiatric/Behavioral: Negative for agitation and sleep disturbance. Snores       Prior to Visit Medications    Medication Sig Taking? Authorizing Provider   lisinopril (PRINIVIL;ZESTRIL) 5 MG tablet Take 1 tablet by mouth daily Yes JORGE LUIS Roberts CNP        Allergies   Allergen Reactions    Ampicillin        History reviewed. No pertinent past medical history.     Past Surgical History:   Procedure Laterality on file to calculate BMI. Physical Exam   Constitutional: He is oriented to person, place, and time. He appears well-developed and well-nourished. No distress. HENT:   Head: Normocephalic and atraumatic. Right Ear: External ear normal.   Left Ear: External ear normal.   Mouth/Throat: Oropharynx is clear and moist. No oropharyngeal exudate. Eyes: Pupils are equal, round, and reactive to light. EOM are normal.   Neck: Normal range of motion. Neck supple. No thyromegaly present. Cardiovascular: Normal rate, regular rhythm, normal heart sounds and normal pulses. No murmur heard. Carotid bruit claudette, left ICS, MCL PMI displaced into axillary line   Pulmonary/Chest: Effort normal and breath sounds normal. No respiratory distress. Abdominal: Soft. He exhibits no mass. There is no tenderness. No bruit in abd, Aorta prominent but within normal limits. Musculoskeletal: Normal range of motion. He exhibits no tenderness. Lymphadenopathy:     He has no cervical adenopathy. Neurological: He is alert and oriented to person, place, and time. Skin: Skin is warm and dry. No rash noted. Psychiatric: He has a normal mood and affect. His behavior is normal. Judgment and thought content normal.   Nursing note and vitals reviewed. ASSESSMENT/PLAN:  1. Essential hypertension  Uncontrolled and need to start medication    - CBC Auto Differential; Future  - Basic Metabolic Panel; Future  - EKG 12 Lead; Future  - lisinopril (PRINIVIL;ZESTRIL) 5 MG tablet; Take 1 tablet by mouth daily  Dispense: 30 tablet; Refill: 1    2. Screening, anemia, deficiency, iron    - CBC Auto Differential; Future    3. Screening for thyroid disorder    - TSH with Reflex; Future    Do cbc, bmp and tsh. EKG  Start lisinopril    See in 4 weeks for physical and follow up. Cholesterol baseline needs done. bp log please. Return in about 4 weeks (around 10/16/2019) for physical-insurance.     An  electronic signature was used to Never smoker

## 2019-09-30 ENCOUNTER — OUTPATIENT (OUTPATIENT)
Dept: OUTPATIENT SERVICES | Facility: HOSPITAL | Age: 24
LOS: 1 days | Discharge: HOME | End: 2019-09-30

## 2019-09-30 VITALS
HEIGHT: 69 IN | HEART RATE: 68 BPM | SYSTOLIC BLOOD PRESSURE: 117 MMHG | OXYGEN SATURATION: 99 % | DIASTOLIC BLOOD PRESSURE: 78 MMHG | WEIGHT: 224.87 LBS | RESPIRATION RATE: 16 BRPM | TEMPERATURE: 96 F

## 2019-09-30 DIAGNOSIS — N84.0 POLYP OF CORPUS UTERI: ICD-10-CM

## 2019-09-30 DIAGNOSIS — Z01.818 ENCOUNTER FOR OTHER PREPROCEDURAL EXAMINATION: ICD-10-CM

## 2019-09-30 DIAGNOSIS — Z98.890 OTHER SPECIFIED POSTPROCEDURAL STATES: Chronic | ICD-10-CM

## 2019-09-30 LAB
ALBUMIN SERPL ELPH-MCNC: 4.1 G/DL — SIGNIFICANT CHANGE UP (ref 3.5–5.2)
ALP SERPL-CCNC: 78 U/L — SIGNIFICANT CHANGE UP (ref 30–115)
ALT FLD-CCNC: 23 U/L — SIGNIFICANT CHANGE UP (ref 0–41)
ANION GAP SERPL CALC-SCNC: 10 MMOL/L — SIGNIFICANT CHANGE UP (ref 7–14)
APPEARANCE UR: CLEAR — SIGNIFICANT CHANGE UP
APTT BLD: 33.3 SEC — SIGNIFICANT CHANGE UP (ref 27–39.2)
AST SERPL-CCNC: 20 U/L — SIGNIFICANT CHANGE UP (ref 0–41)
BASOPHILS # BLD AUTO: 0.05 K/UL — SIGNIFICANT CHANGE UP (ref 0–0.2)
BASOPHILS NFR BLD AUTO: 0.9 % — SIGNIFICANT CHANGE UP (ref 0–1)
BILIRUB SERPL-MCNC: 0.4 MG/DL — SIGNIFICANT CHANGE UP (ref 0.2–1.2)
BILIRUB UR-MCNC: NEGATIVE — SIGNIFICANT CHANGE UP
BUN SERPL-MCNC: 12 MG/DL — SIGNIFICANT CHANGE UP (ref 10–20)
CALCIUM SERPL-MCNC: 8.9 MG/DL — SIGNIFICANT CHANGE UP (ref 8.5–10.1)
CHLORIDE SERPL-SCNC: 103 MMOL/L — SIGNIFICANT CHANGE UP (ref 98–110)
CO2 SERPL-SCNC: 25 MMOL/L — SIGNIFICANT CHANGE UP (ref 17–32)
COLOR SPEC: YELLOW — SIGNIFICANT CHANGE UP
CREAT SERPL-MCNC: 0.6 MG/DL — LOW (ref 0.7–1.5)
DIFF PNL FLD: NEGATIVE — SIGNIFICANT CHANGE UP
EOSINOPHIL # BLD AUTO: 0.32 K/UL — SIGNIFICANT CHANGE UP (ref 0–0.7)
EOSINOPHIL NFR BLD AUTO: 5.5 % — SIGNIFICANT CHANGE UP (ref 0–8)
GLUCOSE SERPL-MCNC: 86 MG/DL — SIGNIFICANT CHANGE UP (ref 70–99)
GLUCOSE UR QL: NEGATIVE — SIGNIFICANT CHANGE UP
HCT VFR BLD CALC: 38.9 % — SIGNIFICANT CHANGE UP (ref 37–47)
HGB BLD-MCNC: 13.2 G/DL — SIGNIFICANT CHANGE UP (ref 12–16)
IMM GRANULOCYTES NFR BLD AUTO: 0.2 % — SIGNIFICANT CHANGE UP (ref 0.1–0.3)
INR BLD: 0.99 RATIO — SIGNIFICANT CHANGE UP (ref 0.65–1.3)
KETONES UR-MCNC: NEGATIVE — SIGNIFICANT CHANGE UP
LEUKOCYTE ESTERASE UR-ACNC: NEGATIVE — SIGNIFICANT CHANGE UP
LYMPHOCYTES # BLD AUTO: 2.51 K/UL — SIGNIFICANT CHANGE UP (ref 1.2–3.4)
LYMPHOCYTES # BLD AUTO: 43.1 % — SIGNIFICANT CHANGE UP (ref 20.5–51.1)
MCHC RBC-ENTMCNC: 30.8 PG — SIGNIFICANT CHANGE UP (ref 27–31)
MCHC RBC-ENTMCNC: 33.9 G/DL — SIGNIFICANT CHANGE UP (ref 32–37)
MCV RBC AUTO: 90.7 FL — SIGNIFICANT CHANGE UP (ref 81–99)
MONOCYTES # BLD AUTO: 0.46 K/UL — SIGNIFICANT CHANGE UP (ref 0.1–0.6)
MONOCYTES NFR BLD AUTO: 7.9 % — SIGNIFICANT CHANGE UP (ref 1.7–9.3)
NEUTROPHILS # BLD AUTO: 2.47 K/UL — SIGNIFICANT CHANGE UP (ref 1.4–6.5)
NEUTROPHILS NFR BLD AUTO: 42.4 % — SIGNIFICANT CHANGE UP (ref 42.2–75.2)
NITRITE UR-MCNC: NEGATIVE — SIGNIFICANT CHANGE UP
NRBC # BLD: 0 /100 WBCS — SIGNIFICANT CHANGE UP (ref 0–0)
PH UR: 6 — SIGNIFICANT CHANGE UP (ref 5–8)
PLATELET # BLD AUTO: 206 K/UL — SIGNIFICANT CHANGE UP (ref 130–400)
POTASSIUM SERPL-MCNC: 4.1 MMOL/L — SIGNIFICANT CHANGE UP (ref 3.5–5)
POTASSIUM SERPL-SCNC: 4.1 MMOL/L — SIGNIFICANT CHANGE UP (ref 3.5–5)
PROT SERPL-MCNC: 6.8 G/DL — SIGNIFICANT CHANGE UP (ref 6–8)
PROT UR-MCNC: SIGNIFICANT CHANGE UP
PROTHROM AB SERPL-ACNC: 11.4 SEC — SIGNIFICANT CHANGE UP (ref 9.95–12.87)
RBC # BLD: 4.29 M/UL — SIGNIFICANT CHANGE UP (ref 4.2–5.4)
RBC # FLD: 12.1 % — SIGNIFICANT CHANGE UP (ref 11.5–14.5)
SODIUM SERPL-SCNC: 138 MMOL/L — SIGNIFICANT CHANGE UP (ref 135–146)
SP GR SPEC: 1.03 — HIGH (ref 1.01–1.02)
UROBILINOGEN FLD QL: SIGNIFICANT CHANGE UP
WBC # BLD: 5.82 K/UL — SIGNIFICANT CHANGE UP (ref 4.8–10.8)
WBC # FLD AUTO: 5.82 K/UL — SIGNIFICANT CHANGE UP (ref 4.8–10.8)

## 2019-09-30 RX ORDER — METFORMIN HYDROCHLORIDE 850 MG/1
0 TABLET ORAL
Qty: 0 | Refills: 0 | DISCHARGE

## 2019-09-30 NOTE — H&P PST ADULT - HISTORY OF PRESENT ILLNESS
23yr old female states has been having pelvic pressure with difficulty urinating and heavy menstrual periods for about 1yr-wk up revealed uterine polyp-presents to pretesting for D&C, hysteroscopy and myosure. Denies c/o CP, PALP, SOB, URI, FEVER, RASH. SOB on walking up 2-3 FOS.

## 2019-10-01 LAB
CULTURE RESULTS: SIGNIFICANT CHANGE UP
SPECIMEN SOURCE: SIGNIFICANT CHANGE UP

## 2019-10-03 ENCOUNTER — RESULT REVIEW (OUTPATIENT)
Age: 24
End: 2019-10-03

## 2019-10-03 ENCOUNTER — OUTPATIENT (OUTPATIENT)
Dept: OUTPATIENT SERVICES | Facility: HOSPITAL | Age: 24
LOS: 1 days | Discharge: HOME | End: 2019-10-03
Payer: COMMERCIAL

## 2019-10-03 VITALS
RESPIRATION RATE: 19 BRPM | OXYGEN SATURATION: 99 % | TEMPERATURE: 98 F | SYSTOLIC BLOOD PRESSURE: 117 MMHG | DIASTOLIC BLOOD PRESSURE: 57 MMHG | HEART RATE: 70 BPM

## 2019-10-03 VITALS
HEART RATE: 78 BPM | DIASTOLIC BLOOD PRESSURE: 66 MMHG | TEMPERATURE: 98 F | RESPIRATION RATE: 20 BRPM | WEIGHT: 224.87 LBS | HEIGHT: 69 IN | OXYGEN SATURATION: 98 % | SYSTOLIC BLOOD PRESSURE: 117 MMHG

## 2019-10-03 DIAGNOSIS — Z98.890 OTHER SPECIFIED POSTPROCEDURAL STATES: Chronic | ICD-10-CM

## 2019-10-03 PROCEDURE — 88305 TISSUE EXAM BY PATHOLOGIST: CPT | Mod: 26

## 2019-10-03 RX ORDER — HYDROMORPHONE HYDROCHLORIDE 2 MG/ML
0.5 INJECTION INTRAMUSCULAR; INTRAVENOUS; SUBCUTANEOUS
Refills: 0 | Status: DISCONTINUED | OUTPATIENT
Start: 2019-10-03 | End: 2019-10-03

## 2019-10-03 RX ORDER — SODIUM CHLORIDE 9 MG/ML
1000 INJECTION, SOLUTION INTRAVENOUS
Refills: 0 | Status: DISCONTINUED | OUTPATIENT
Start: 2019-10-03 | End: 2019-10-19

## 2019-10-03 RX ORDER — ONDANSETRON 8 MG/1
4 TABLET, FILM COATED ORAL ONCE
Refills: 0 | Status: DISCONTINUED | OUTPATIENT
Start: 2019-10-03 | End: 2019-10-19

## 2019-10-03 RX ORDER — OXYCODONE AND ACETAMINOPHEN 5; 325 MG/1; MG/1
1 TABLET ORAL EVERY 4 HOURS
Refills: 0 | Status: DISCONTINUED | OUTPATIENT
Start: 2019-10-03 | End: 2019-10-03

## 2019-10-03 RX ADMIN — SODIUM CHLORIDE 100 MILLILITER(S): 9 INJECTION, SOLUTION INTRAVENOUS at 14:21

## 2019-10-03 NOTE — BRIEF OPERATIVE NOTE - OPERATION/FINDINGS
EUA: Normal-sized anteverted uterus with closed cervix, no adnexal masses palpated bilaterally. On hysteroscopy, uterine cavity visualized with polypoid tissue and several small endometrial polyps, ostia appeared normal bilaterally. Adequate endometrial sample obtained.

## 2019-10-03 NOTE — BRIEF OPERATIVE NOTE - NSICDXBRIEFPROCEDURE_GEN_ALL_CORE_FT
PROCEDURES:  Hysteroscopy with dilation and curettage of uterus using MyoSure device 03-Oct-2019 14:07:16  Alia Corey

## 2019-10-03 NOTE — ASU DISCHARGE PLAN (ADULT/PEDIATRIC) - CARE PROVIDER_API CALL
Krystian Hurd)  Obstetrics and Gynecology  [address] [place] [address]  Phone: [phone]  Fax: [phone]  Follow Up Time:

## 2019-10-08 LAB — SURGICAL PATHOLOGY STUDY: SIGNIFICANT CHANGE UP

## 2019-10-09 DIAGNOSIS — N84.0 POLYP OF CORPUS UTERI: ICD-10-CM

## 2019-10-09 DIAGNOSIS — Z88.0 ALLERGY STATUS TO PENICILLIN: ICD-10-CM

## 2019-10-09 DIAGNOSIS — N71.1 CHRONIC INFLAMMATORY DISEASE OF UTERUS: ICD-10-CM

## 2020-02-21 NOTE — ED ADULT NURSE NOTE - NSFALLRSKASSESSDT_ED_ALL_ED
DX:  PAF                       Goal: 2.0 - 3.0  Patient comes to clinic for follow up anticoagulation visit.   Last INR 2/14/2020 was 2.1. Dose maintained per protocol.   Today's INR is 1.8 and is below goal range.    Current warfarin dosing verified with patient. Patient was informed that today's INR result is below therapeutic range and instructed to increase current dose per protocol. Discussed return date for next INR - 10 days.    After visit summary printed and given to the patient.    Dr. Evangelista is in the office today supervising the treatment. Note forwarded to physician for review.    Call your physician immediately if you notice any of the following symptoms of a blood clot:   · Sudden weakness in any limb  · Numbness or tingling anywhere  · Visual changes or loss of sight in either eye  · Sudden onset of slurred speech or inability to speak  · Dizziness or faintness  · New pain, swelling, redness or heat in any extremity  · New SOB or chest pain  Symptoms associated with blood clotting/low INR reviewed and verbalizes understanding.    Patient was instructed to contact the clinic with any unusual bleeding or bruising, any changes in medications, diet, health status, lifestyle, or any other changes, questions or concerns. Patient verbalized understanding of all discussed.       
12-Feb-2019 22:03

## 2020-10-20 ENCOUNTER — EMERGENCY (EMERGENCY)
Facility: HOSPITAL | Age: 25
LOS: 0 days | Discharge: HOME | End: 2020-10-21
Attending: EMERGENCY MEDICINE | Admitting: EMERGENCY MEDICINE
Payer: COMMERCIAL

## 2020-10-20 VITALS
RESPIRATION RATE: 17 BRPM | TEMPERATURE: 96 F | SYSTOLIC BLOOD PRESSURE: 132 MMHG | DIASTOLIC BLOOD PRESSURE: 79 MMHG | HEART RATE: 87 BPM | OXYGEN SATURATION: 100 % | HEIGHT: 69 IN

## 2020-10-20 DIAGNOSIS — Z88.1 ALLERGY STATUS TO OTHER ANTIBIOTIC AGENTS STATUS: ICD-10-CM

## 2020-10-20 DIAGNOSIS — R51.9 HEADACHE, UNSPECIFIED: ICD-10-CM

## 2020-10-20 DIAGNOSIS — Z91.013 ALLERGY TO SEAFOOD: ICD-10-CM

## 2020-10-20 DIAGNOSIS — I10 ESSENTIAL (PRIMARY) HYPERTENSION: ICD-10-CM

## 2020-10-20 DIAGNOSIS — Z88.0 ALLERGY STATUS TO PENICILLIN: ICD-10-CM

## 2020-10-20 DIAGNOSIS — Z98.890 OTHER SPECIFIED POSTPROCEDURAL STATES: Chronic | ICD-10-CM

## 2020-10-20 PROCEDURE — 99284 EMERGENCY DEPT VISIT MOD MDM: CPT

## 2020-10-20 NOTE — ED ADULT TRIAGE NOTE - CHIEF COMPLAINT QUOTE
Pt BP at home 197/90. denies hx of HTN. complaining of headache and dizziness. Pt BP at home 197/90. denies hx of HTN. complaining of headache and dizziness. hx of stroke unit admission. Pt evaluated by MD Podlog in triage. NIH negative in triage

## 2020-10-21 LAB
ALBUMIN SERPL ELPH-MCNC: 4.4 G/DL — SIGNIFICANT CHANGE UP (ref 3.5–5.2)
ALP SERPL-CCNC: 93 U/L — SIGNIFICANT CHANGE UP (ref 30–115)
ALT FLD-CCNC: 14 U/L — SIGNIFICANT CHANGE UP (ref 0–41)
ANION GAP SERPL CALC-SCNC: 10 MMOL/L — SIGNIFICANT CHANGE UP (ref 7–14)
AST SERPL-CCNC: 14 U/L — SIGNIFICANT CHANGE UP (ref 0–41)
BASOPHILS # BLD AUTO: 0.04 K/UL — SIGNIFICANT CHANGE UP (ref 0–0.2)
BASOPHILS NFR BLD AUTO: 0.4 % — SIGNIFICANT CHANGE UP (ref 0–1)
BILIRUB SERPL-MCNC: 0.3 MG/DL — SIGNIFICANT CHANGE UP (ref 0.2–1.2)
BUN SERPL-MCNC: 15 MG/DL — SIGNIFICANT CHANGE UP (ref 10–20)
CALCIUM SERPL-MCNC: 9.6 MG/DL — SIGNIFICANT CHANGE UP (ref 8.5–10.1)
CHLORIDE SERPL-SCNC: 104 MMOL/L — SIGNIFICANT CHANGE UP (ref 98–110)
CO2 SERPL-SCNC: 24 MMOL/L — SIGNIFICANT CHANGE UP (ref 17–32)
CREAT SERPL-MCNC: 0.8 MG/DL — SIGNIFICANT CHANGE UP (ref 0.7–1.5)
EOSINOPHIL # BLD AUTO: 0.24 K/UL — SIGNIFICANT CHANGE UP (ref 0–0.7)
EOSINOPHIL NFR BLD AUTO: 2.7 % — SIGNIFICANT CHANGE UP (ref 0–8)
GLUCOSE SERPL-MCNC: 95 MG/DL — SIGNIFICANT CHANGE UP (ref 70–99)
HCT VFR BLD CALC: 40.8 % — SIGNIFICANT CHANGE UP (ref 37–47)
HGB BLD-MCNC: 13.9 G/DL — SIGNIFICANT CHANGE UP (ref 12–16)
IMM GRANULOCYTES NFR BLD AUTO: 0.2 % — SIGNIFICANT CHANGE UP (ref 0.1–0.3)
LYMPHOCYTES # BLD AUTO: 3.44 K/UL — HIGH (ref 1.2–3.4)
LYMPHOCYTES # BLD AUTO: 38.2 % — SIGNIFICANT CHANGE UP (ref 20.5–51.1)
MCHC RBC-ENTMCNC: 30.9 PG — SIGNIFICANT CHANGE UP (ref 27–31)
MCHC RBC-ENTMCNC: 34.1 G/DL — SIGNIFICANT CHANGE UP (ref 32–37)
MCV RBC AUTO: 90.7 FL — SIGNIFICANT CHANGE UP (ref 81–99)
MONOCYTES # BLD AUTO: 0.78 K/UL — HIGH (ref 0.1–0.6)
MONOCYTES NFR BLD AUTO: 8.7 % — SIGNIFICANT CHANGE UP (ref 1.7–9.3)
NEUTROPHILS # BLD AUTO: 4.48 K/UL — SIGNIFICANT CHANGE UP (ref 1.4–6.5)
NEUTROPHILS NFR BLD AUTO: 49.8 % — SIGNIFICANT CHANGE UP (ref 42.2–75.2)
NRBC # BLD: 0 /100 WBCS — SIGNIFICANT CHANGE UP (ref 0–0)
PLATELET # BLD AUTO: 224 K/UL — SIGNIFICANT CHANGE UP (ref 130–400)
POTASSIUM SERPL-MCNC: 4.1 MMOL/L — SIGNIFICANT CHANGE UP (ref 3.5–5)
POTASSIUM SERPL-SCNC: 4.1 MMOL/L — SIGNIFICANT CHANGE UP (ref 3.5–5)
PROT SERPL-MCNC: 7.4 G/DL — SIGNIFICANT CHANGE UP (ref 6–8)
RBC # BLD: 4.5 M/UL — SIGNIFICANT CHANGE UP (ref 4.2–5.4)
RBC # FLD: 11.6 % — SIGNIFICANT CHANGE UP (ref 11.5–14.5)
SODIUM SERPL-SCNC: 138 MMOL/L — SIGNIFICANT CHANGE UP (ref 135–146)
WBC # BLD: 9 K/UL — SIGNIFICANT CHANGE UP (ref 4.8–10.8)
WBC # FLD AUTO: 9 K/UL — SIGNIFICANT CHANGE UP (ref 4.8–10.8)

## 2020-10-21 RX ORDER — METOCLOPRAMIDE HCL 10 MG
10 TABLET ORAL ONCE
Refills: 0 | Status: COMPLETED | OUTPATIENT
Start: 2020-10-21 | End: 2020-10-21

## 2020-10-21 RX ORDER — SODIUM CHLORIDE 9 MG/ML
1000 INJECTION, SOLUTION INTRAVENOUS ONCE
Refills: 0 | Status: COMPLETED | OUTPATIENT
Start: 2020-10-21 | End: 2020-10-21

## 2020-10-21 RX ORDER — KETOROLAC TROMETHAMINE 30 MG/ML
15 SYRINGE (ML) INJECTION ONCE
Refills: 0 | Status: DISCONTINUED | OUTPATIENT
Start: 2020-10-21 | End: 2020-10-21

## 2020-10-21 RX ADMIN — Medication 10 MILLIGRAM(S): at 00:38

## 2020-10-21 RX ADMIN — Medication 15 MILLIGRAM(S): at 00:38

## 2020-10-21 RX ADMIN — SODIUM CHLORIDE 1000 MILLILITER(S): 9 INJECTION, SOLUTION INTRAVENOUS at 00:38

## 2020-10-21 NOTE — ED PROVIDER NOTE - OBJECTIVE STATEMENT
26 yo F, history of SLE diagnosed based on elevated MELANIE but not currently on any medication due to no active symptoms, amenorrhea, currently on medroxyprogesterone, here for assessment of episode of L sided occipital HA with associated global weakness -- HA described as throbbing, gradually worsening, patient took BP, noted systolic of 190 and then felt globally weak like her "brain just shut off." Sx improved rapidly but patient was concerned about a stroke as she had a previous episode like this with L sided facial tingling and was admitted for stroke work up. Per chart review, stroke work up was negative 2018.     No recent travel, sick contacts, recent illness. No chest pain, dyspnea, blurred vision, change in speech, change in gait, unilateral weakness. Only change today is that the patient took her first dose of medroxyprogesterone today.

## 2020-10-21 NOTE — ED PROVIDER NOTE - PATIENT PORTAL LINK FT
You can access the FollowMyHealth Patient Portal offered by [facility] by registering at the following website: [email or web address]. By joining [facility]’s FollowMyHealth portal, you will also be able to view your health information using other applications (apps) compatible with our system.

## 2020-10-21 NOTE — ED PROVIDER NOTE - NS ED ROS FT
Constitutional: no fever, chills, no recent weight loss, change in appetite or malaise  Cardiac: No chest pain, SOB or edema.  Respiratory: No cough or respiratory distress  GI: No nausea, vomiting, diarrhea or abdominal pain.  : No dysuria, frequency, urgency or hematuria  MS: no pain to back or extremities, no loss of ROM, no weakness  Neuro: see HPI  Skin: No skin rash.  Except as documented in the HPI, all other systems are negative.

## 2020-10-21 NOTE — ED PROVIDER NOTE - NSFOLLOWUPCLINICS_GEN_ALL_ED_FT
[facility]  Neurology  [address] [place] [address], [facility] [address]  [place] [address]  Phone: [phone]  Fax:   Follow Up Time:

## 2020-10-21 NOTE — ED ADULT NURSE NOTE - OBJECTIVE STATEMENT
Pt reports elevated blood pressure after having stabbing and shooting headache starting around 830. Drank coffee after Pt reports elevated blood pressure after having stabbing and shooting headache starting around 830. Drank coffee after initial headache, which made headache even worse. Started on progesterone today, which made her dizzy and "fuzzy" at work, which resolved

## 2020-10-21 NOTE — ED ADULT NURSE NOTE - CHIEF COMPLAINT QUOTE
Pt BP at home 197/90. denies hx of HTN. complaining of headache and dizziness. hx of stroke unit admission. Pt evaluated by MD Podlog in triage. NIH negative in triage

## 2020-10-21 NOTE — ED PROVIDER NOTE - CLINICAL SUMMARY MEDICAL DECISION MAKING FREE TEXT BOX
HA resolved, patient no longer feels weak, labs normal.    Likely migraine, in discussing this with the patient she reports that she often has HAs which tend to resolve only with Excedrin/caffeine.     Continues to have non focal neuro exam, will dc home with return precautions, neuro follow up.

## 2020-10-21 NOTE — ED PROVIDER NOTE - CARE PLAN
Assessment and plan of treatment:	Patient with L sided occipital HA, subjective global weakness and elevated BP at home -- Bp improved in ED, non focal neuro exam, had stroke work up 2 years ago for similar episode with L sided facial numbness which was normal -- will get labs, treat HA and reasess, stroke scale is currently 0 and patient did not report CVA like sx during this event   Principal Discharge DX:	Headache  Assessment and plan of treatment:	Patient with L sided occipital HA, subjective global weakness and elevated BP at home -- Bp improved in ED, non focal neuro exam, had stroke work up 2 years ago for similar episode with L sided facial numbness which was normal -- will get labs, treat HA and reasess, stroke scale is currently 0 and patient did not report CVA like sx during this event

## 2020-10-21 NOTE — ED PROVIDER NOTE - PLAN OF CARE
Patient with L sided occipital HA, subjective global weakness and elevated BP at home -- Bp improved in ED, non focal neuro exam, had stroke work up 2 years ago for similar episode with L sided facial numbness which was normal -- will get labs, treat HA and reasess, stroke scale is currently 0 and patient did not report CVA like sx during this event

## 2021-08-09 NOTE — STROKE CODE NOTE - NS AS STROKE CODE PATIENT KNOWNTIME
Behavioral Health Interdisciplinary Rounds     Patient Name: Jarrod Adams  Age: 46 y.o. Room/Bed:  236/01  Primary Diagnosis: Major depressive disorder, recurrent, unspecified ([facility]   Admission Status: Voluntary     Readmission within 30 days: no  Power of  in place: no  Patient requires a blocked bed: no          Reason for blocked bed:     VTE Prophylaxis: Not indicated    Mobility needs/Fall risk: yes  Flu Vaccine : no   Nutritional Plan: no  Consults: no         Labs/Testing due today?: no    Sleep hours: 6.75       Participation in Care/Groups:  yes  Medication Compliant?: Yes  PRNS (last 24 hours): None    Restraints (last 24 hours):  no     CIWA (range last 24 hours):     COWS (range last 24 hours):      Alcohol screening (AUDIT) completed -         If applicable, date SBIRT discussed in treatment team AND documented:   AUDIT Screen Score:        Document Brief Intervention (corresponds directly with the 5 A's, Ask, Advise, Assess, Assist, and Arrange): At- Risk Patients (Score 7-15 for women; 8-15 for men)  Discuss concern patient is drinking at unhealthy levels known to increase risk of alcohol-related health problems. Is Patient ready to commit to change? If No:   Encourage reflection   Discuss short term and long term health risks of consuming alcohol   Barriers to change   Reaffirm willingness to help / Educational materials provided  If Yes:   Set goal  Glowing Plant provided    Harmful use or Dependence (Score 16 or greater)   Discuss short term and long term health risks of consuming alcohol   Recommendations   Negotiate drinking goal   Recommend addiction specialist/center   Arrange follow-up appointments.     Tobacco - patient is a smoker: Have You Used Tobacco in the Past 30 Days: Yes  Illegal Drugs use: Have You Used Any Illegal Substances Over the Past 12 Months: Yes    24 hour chart check complete: yes     Patient goal(s) for today: to stay
awake  Treatment team focus/goals: attend activities and groups  Progress note patient will be discharging tomorrow going to a crisis unit by medicaid taxi    LOS:  7  Expected LOS: 1    Financial concerns/prescription coverage:  no  Family contact: no      Family requesting physician contact today:  no  Discharge plan: CSU  Access to weapons : no        Outpatient provider(s): yes  Patient's preferred phone number for follow up call : [phone]    Participating treatment team members:  Jackie Santos * (assigned SW), Etta Herrera
Known
Known

## 2022-06-17 ENCOUNTER — EMERGENCY (EMERGENCY)
Facility: HOSPITAL | Age: 27
LOS: 0 days | Discharge: HOME | End: 2022-06-18
Attending: EMERGENCY MEDICINE | Admitting: EMERGENCY MEDICINE
Payer: COMMERCIAL

## 2022-06-17 VITALS
DIASTOLIC BLOOD PRESSURE: 95 MMHG | OXYGEN SATURATION: 98 % | SYSTOLIC BLOOD PRESSURE: 142 MMHG | WEIGHT: 220.02 LBS | RESPIRATION RATE: 20 BRPM | TEMPERATURE: 97 F | HEIGHT: 69 IN | HEART RATE: 115 BPM

## 2022-06-17 DIAGNOSIS — Z91.013 ALLERGY TO SEAFOOD: ICD-10-CM

## 2022-06-17 DIAGNOSIS — Z88.1 ALLERGY STATUS TO OTHER ANTIBIOTIC AGENTS STATUS: ICD-10-CM

## 2022-06-17 DIAGNOSIS — R07.81 PLEURODYNIA: ICD-10-CM

## 2022-06-17 DIAGNOSIS — G45.9 TRANSIENT CEREBRAL ISCHEMIC ATTACK, UNSPECIFIED: ICD-10-CM

## 2022-06-17 DIAGNOSIS — R07.89 OTHER CHEST PAIN: ICD-10-CM

## 2022-06-17 DIAGNOSIS — Z88.0 ALLERGY STATUS TO PENICILLIN: ICD-10-CM

## 2022-06-17 DIAGNOSIS — R00.2 PALPITATIONS: ICD-10-CM

## 2022-06-17 DIAGNOSIS — Z20.822 CONTACT WITH AND (SUSPECTED) EXPOSURE TO COVID-19: ICD-10-CM

## 2022-06-17 DIAGNOSIS — Z87.19 PERSONAL HISTORY OF OTHER DISEASES OF THE DIGESTIVE SYSTEM: ICD-10-CM

## 2022-06-17 DIAGNOSIS — Z98.890 OTHER SPECIFIED POSTPROCEDURAL STATES: Chronic | ICD-10-CM

## 2022-06-17 DIAGNOSIS — Z90.49 ACQUIRED ABSENCE OF OTHER SPECIFIED PARTS OF DIGESTIVE TRACT: ICD-10-CM

## 2022-06-17 LAB
ALBUMIN SERPL ELPH-MCNC: 4.5 G/DL — SIGNIFICANT CHANGE UP (ref 3.5–5.2)
ALP SERPL-CCNC: 97 U/L — SIGNIFICANT CHANGE UP (ref 30–115)
ALT FLD-CCNC: 20 U/L — SIGNIFICANT CHANGE UP (ref 0–41)
ANION GAP SERPL CALC-SCNC: 14 MMOL/L — SIGNIFICANT CHANGE UP (ref 7–14)
AST SERPL-CCNC: 19 U/L — SIGNIFICANT CHANGE UP (ref 0–41)
BASOPHILS # BLD AUTO: 0.04 K/UL — SIGNIFICANT CHANGE UP (ref 0–0.2)
BASOPHILS NFR BLD AUTO: 0.5 % — SIGNIFICANT CHANGE UP (ref 0–1)
BILIRUB DIRECT SERPL-MCNC: <0.2 MG/DL — SIGNIFICANT CHANGE UP (ref 0–0.3)
BILIRUB INDIRECT FLD-MCNC: >0 MG/DL — LOW (ref 0.2–1.2)
BILIRUB SERPL-MCNC: 0.2 MG/DL — SIGNIFICANT CHANGE UP (ref 0.2–1.2)
BUN SERPL-MCNC: 18 MG/DL — SIGNIFICANT CHANGE UP (ref 10–20)
CALCIUM SERPL-MCNC: 9.5 MG/DL — SIGNIFICANT CHANGE UP (ref 8.5–10.1)
CHLORIDE SERPL-SCNC: 98 MMOL/L — SIGNIFICANT CHANGE UP (ref 98–110)
CO2 SERPL-SCNC: 23 MMOL/L — SIGNIFICANT CHANGE UP (ref 17–32)
CREAT SERPL-MCNC: 0.9 MG/DL — SIGNIFICANT CHANGE UP (ref 0.7–1.5)
D DIMER BLD IA.RAPID-MCNC: <150 NG/ML DDU — SIGNIFICANT CHANGE UP (ref 0–230)
EGFR: 90 ML/MIN/1.73M2 — SIGNIFICANT CHANGE UP
EOSINOPHIL # BLD AUTO: 0.25 K/UL — SIGNIFICANT CHANGE UP (ref 0–0.7)
EOSINOPHIL NFR BLD AUTO: 2.9 % — SIGNIFICANT CHANGE UP (ref 0–8)
ERYTHROCYTE [SEDIMENTATION RATE] IN BLOOD: 21 MM/HR — HIGH (ref 0–20)
GLUCOSE SERPL-MCNC: 115 MG/DL — HIGH (ref 70–99)
HCG SERPL QL: NEGATIVE — SIGNIFICANT CHANGE UP
HCT VFR BLD CALC: 41.4 % — SIGNIFICANT CHANGE UP (ref 37–47)
HGB BLD-MCNC: 14.9 G/DL — SIGNIFICANT CHANGE UP (ref 12–16)
IMM GRANULOCYTES NFR BLD AUTO: 0.2 % — SIGNIFICANT CHANGE UP (ref 0.1–0.3)
LIDOCAIN IGE QN: 27 U/L — SIGNIFICANT CHANGE UP (ref 7–60)
LYMPHOCYTES # BLD AUTO: 2.56 K/UL — SIGNIFICANT CHANGE UP (ref 1.2–3.4)
LYMPHOCYTES # BLD AUTO: 29.8 % — SIGNIFICANT CHANGE UP (ref 20.5–51.1)
MAGNESIUM SERPL-MCNC: 2 MG/DL — SIGNIFICANT CHANGE UP (ref 1.8–2.4)
MCHC RBC-ENTMCNC: 31.3 PG — HIGH (ref 27–31)
MCHC RBC-ENTMCNC: 36 G/DL — SIGNIFICANT CHANGE UP (ref 32–37)
MCV RBC AUTO: 87 FL — SIGNIFICANT CHANGE UP (ref 81–99)
MONOCYTES # BLD AUTO: 0.67 K/UL — HIGH (ref 0.1–0.6)
MONOCYTES NFR BLD AUTO: 7.8 % — SIGNIFICANT CHANGE UP (ref 1.7–9.3)
NEUTROPHILS # BLD AUTO: 5.06 K/UL — SIGNIFICANT CHANGE UP (ref 1.4–6.5)
NEUTROPHILS NFR BLD AUTO: 58.8 % — SIGNIFICANT CHANGE UP (ref 42.2–75.2)
NRBC # BLD: 0 /100 WBCS — SIGNIFICANT CHANGE UP (ref 0–0)
NT-PROBNP SERPL-SCNC: 19 PG/ML — SIGNIFICANT CHANGE UP (ref 0–300)
PLATELET # BLD AUTO: 241 K/UL — SIGNIFICANT CHANGE UP (ref 130–400)
POTASSIUM SERPL-MCNC: 4.1 MMOL/L — SIGNIFICANT CHANGE UP (ref 3.5–5)
POTASSIUM SERPL-SCNC: 4.1 MMOL/L — SIGNIFICANT CHANGE UP (ref 3.5–5)
PROT SERPL-MCNC: 7.8 G/DL — SIGNIFICANT CHANGE UP (ref 6–8)
RBC # BLD: 4.76 M/UL — SIGNIFICANT CHANGE UP (ref 4.2–5.4)
RBC # FLD: 11.4 % — LOW (ref 11.5–14.5)
SODIUM SERPL-SCNC: 135 MMOL/L — SIGNIFICANT CHANGE UP (ref 135–146)
TROPONIN T SERPL-MCNC: <0.01 NG/ML — SIGNIFICANT CHANGE UP
WBC # BLD: 8.6 K/UL — SIGNIFICANT CHANGE UP (ref 4.8–10.8)
WBC # FLD AUTO: 8.6 K/UL — SIGNIFICANT CHANGE UP (ref 4.8–10.8)

## 2022-06-17 PROCEDURE — 70498 CT ANGIOGRAPHY NECK: CPT | Mod: 26,MA

## 2022-06-17 PROCEDURE — 99283 EMERGENCY DEPT VISIT LOW MDM: CPT

## 2022-06-17 PROCEDURE — 93010 ELECTROCARDIOGRAM REPORT: CPT

## 2022-06-17 PROCEDURE — 70496 CT ANGIOGRAPHY HEAD: CPT | Mod: 26,MA

## 2022-06-17 PROCEDURE — 99285 EMERGENCY DEPT VISIT HI MDM: CPT

## 2022-06-17 RX ORDER — SODIUM CHLORIDE 9 MG/ML
1000 INJECTION, SOLUTION INTRAVENOUS ONCE
Refills: 0 | Status: COMPLETED | OUTPATIENT
Start: 2022-06-17 | End: 2022-06-17

## 2022-06-17 RX ORDER — METOCLOPRAMIDE HCL 10 MG
10 TABLET ORAL ONCE
Refills: 0 | Status: COMPLETED | OUTPATIENT
Start: 2022-06-17 | End: 2022-06-17

## 2022-06-17 RX ADMIN — Medication 104 MILLIGRAM(S): at 23:30

## 2022-06-17 RX ADMIN — SODIUM CHLORIDE 1000 MILLILITER(S): 9 INJECTION, SOLUTION INTRAVENOUS at 22:37

## 2022-06-17 NOTE — STROKE CODE NOTE - PATIENT LAST KNOWN WELL_DATE TIME
Bowen Pollack MD        OFFICE  FOLLOWUP NOTE    Chief complaints: patient is here for management of  leg swelling,SVT, bipolar, fibromyalgia, chest pain, rt lung, end stage lung disease and COPD, pericardial drainage and [facility],? PAF, bleeding gastritis    Subjective: patient feels better, no chest pain, no shortness of breath, no dizziness, no palpitations    HPI Staci Woods is a 62 y. o.year old who  has a past medical history of Anemia, Anxiety, Arthritis, Back pain at L4-L5 level, Bipolar 1 disorder (HCC), COPD (chronic obstructive pulmonary disease) ([facility] [address] Emphysema of lung ([facility] [address] FH: CAD (coronary artery disease), Fibromyalgia, Full dentures, Gastric ulcer, H/O Doppler ultrasound, H/O echocardiogram, History of blood transfusion, Hyperlipidemia LDL goal <100, Hypertension, Irregular heartbeat, Obstructive sleep apnea, On home oxygen therapy, Osteoarthritis, Pericardial effusion, Spinal stenosis, Thyroid disease, and Wears glasses. and presents for management of  leg swelling,SVT, bipolar, fibromyalgia, chest pain, rt lung, end stage lung disease and COPD, pericardial drainage and [place],? PAF, bleeding gastritiswhich are well controlled    She had pfizer covid 19 shots, has left arm pain    Current Outpatient Medications   Medication Sig Dispense Refill    furosemide (LASIX) 20 MG tablet Take 2 tablets by mouth 2 times daily 90 tablet 3    potassium chloride (KLOR-CON M) 20 MEQ extended release tablet Take 2 tablets by mouth daily 90 tablet 1    dilTIAZem (CARDIZEM CD) 120 MG extended release capsule Take 1 capsule by mouth daily 90 capsule 3    metoprolol succinate (TOPROL XL) 25 MG extended release tablet Take 1 tablet by mouth daily 30 tablet 3    clonazePAM (KLONOPIN) 1 MG disintegrating tablet Take 1 mg by mouth 3 times daily as needed.        ferrous sulfate (IRON 325) 325 (65 Fe) MG tablet Take 325 mg by mouth 2 times daily Indications: pt taking every day bid Monday, wed, fri       atorvastatin (LIPITOR) 40 MG tablet Take 40 mg by mouth daily      ondansetron (ZOFRAN ODT) 4 MG disintegrating tablet Take 1 tablet by mouth every 8 hours as needed for Nausea or Vomiting Place under the tongue and let it melt and absorb from under your tongue.  30 tablet 3    lactobacillus (CULTURELLE) capsule Take 1 capsule by mouth daily (with breakfast) 30 capsule 0    pantoprazole (PROTONIX) 40 MG tablet Take 1 tablet by mouth 2 times daily 60 tablet 3    sucralfate (CARAFATE) 1 GM tablet Take 1 tablet by mouth every 12 hours 120 tablet 3    dicyclomine (BENTYL) 10 MG capsule Take 1 capsule by mouth 4 times daily 360 capsule 1    theophylline (MIAH-24) 200 MG extended release capsule Take 400 mg by mouth daily      budesonide-formoterol (SYMBICORT) 160-4.5 MCG/ACT AERO USE 2 INHALATIONS TWICE A DAY 30.6 g 3    ipratropium-albuterol (DUONEB) 0.5-2.5 (3) MG/3ML SOLN nebulizer solution Inhale 3 mLs into the lungs every 4 hours 1080 mL 3    guaiFENesin (MUCINEX) 600 MG extended release tablet Take 1 tablet by mouth 2 times daily 30 tablet 0    montelukast (SINGULAIR) 10 MG tablet Take 1 tablet by mouth daily 30 tablet 3    levothyroxine (SYNTHROID) 100 MCG tablet Take 1 tablet by mouth Daily 30 tablet 2    folic acid (FOLVITE) 1 MG tablet Take 1 tablet by mouth daily 30 tablet 3    vitamin B-1 100 MG tablet Take 1 tablet by mouth daily 30 tablet 3    albuterol-ipratropium (COMBIVENT RESPIMAT)  MCG/ACT AERS inhaler Inhale 1 puff into the lungs every 4 hours as needed for Wheezing 3 Inhaler 0    DULoxetine (CYMBALTA) 60 MG extended release capsule Take 60 mg by mouth daily      busPIRone (BUSPAR) 10 MG tablet Take 1 tablet by mouth 3 times daily (Patient taking differently: Take 10 mg by mouth 2 times daily ) 90 tablet 0    traZODone (DESYREL) 50 MG tablet Take 100 mg by mouth nightly       baclofen (LIORESAL) 10 MG tablet Take 1 tablet by mouth 3 times daily (Patient taking differently: Take 10 mg by mouth 2 times daily ) 30 tablet 0    aspirin 81 MG chewable tablet Take 81 mg by mouth daily      fluticasone (FLONASE) 50 MCG/ACT nasal spray 2 sprays by Nasal route daily 1 Bottle 0     No current facility-administered medications for this visit.       Allergies: Lisinopril  Past Medical History:   Diagnosis Date    Anemia     Anxiety 02/16/2017    follows with Dr Elke Martinez    Arthritis     Back pain at L4-L5 level 2/16/2017    Dr Angel Morejon 1 disorder ([facility] [address]     per pt on 2/5/2021\"never told I have bipolar\"    COPD (chronic obstructive pulmonary disease) ([facility] [address]     follows with Dr Say Ceballos Emphysema of lung ([facility] [address]     FH: CAD (coronary artery disease) 2/16/2017    Father had in his forties, sister in her thirties    Tina Nakkaren Fibromyalgia 02/16/2017    Full dentures     full upper plate and partial on the bottom    Gastric ulcer     \"had stomach ulder back fall 2019\"    H/O Doppler ultrasound 04/05/2019    venous- no DVT or reflux    H/O echocardiogram 01/14/2015    EF50-55% Normal- see media    History of blood transfusion     Hyperlipidemia LDL goal <100     Hypertension     Dr Staci Tanner Irregular heartbeat     \"they said I have irreg heart beat before- back when they drained fluid around my heart \"    Obstructive sleep apnea     \"have bipap I use at home\"    On home oxygen therapy     \"on oxygen all the time at home and keep it on 2.5 liters\"    Osteoarthritis     Pericardial effusion     per old chart had pericardial effusion 10/2020    Spinal stenosis     hx per old chart    Thyroid disease     Wears glasses     \"suppose to wear glasses\"     Past Surgical History:   Procedure Laterality Date    BACK SURGERY  03/2017    \"surgery on low back L5-6- not sure if they put any metal in \"   [address]      10/2019    CARDIAC CATHETERIZATION      per old chart had cath done 11/2020    CARPAL TUNNEL RELEASE Right 1985    CHOLECYSTECTOMY, LAPAROSCOPIC N/A 10/25/2020 17-Jun-2022 CHOLECYSTECTOMY LAPAROSCOPIC WITH IOC performed by Vincent Mederos MD at [facility] COLONOSCOPY N/A 2019    COLONOSCOPY DIAGNOSTIC performed by Destiney Frausto MD at [address] [facility] [place], DIAGNOSTIC      per old chart had egd done 2018    TUBAL LIGATION  1987    UPPER GASTROINTESTINAL ENDOSCOPY N/A 2021    EGD BIOPSY performed by Destiney Frausto MD at [facility] ENDOSCOPY     Family History   Problem Relation Age of Onset    Asthma Mother         COPD    Heart Disease Father      Social History     Tobacco Use    Smoking status: Former Smoker     Packs/day: 1.50     Years: 20.00     Pack years: 30.00     Types: Cigarettes     Quit date: 2008     Years since quittin.2    Smokeless tobacco: Never Used   Substance Use Topics    Alcohol use: Not Currently     Alcohol/week: 2.0 standard drinks     Types: 2 Shots of liquor per week     Comment: per pt on 2021\"quit drinking back Oct 2020\"use to drink wine coolers - 3 times per week \"/caffeine 2-3 coffees aday 1 pop       [unfilled]  Review of Systems:   · Constitutional: No Fever or Weight Loss   · Eyes: No Decreased Vision  · ENT: No Headaches, Hearing Loss or Vertigo  · Cardiovascular: No chest pain, dyspnea on exertion, palpitations or loss of consciousness  · Respiratory: No cough or wheezing    · Gastrointestinal: No abdominal pain, appetite loss, blood in stools, constipation, diarrhea or heartburn  · Genitourinary: No dysuria, trouble voiding, or hematuria  · Musculoskeletal:  No gait disturbance, weakness or joint complaints  · Integumentary: No rash or pruritis  · Neurological: No TIA or stroke symptoms  · Psychiatric: No anxiety or depression  · Endocrine: No malaise, fatigue or temperature intolerance  · Hematologic/Lymphatic: No bleeding problems, blood clots or swollen lymph nodes  · Allergic/Immunologic: No nasal congestion or hives  All systems negative except as marked.    Objective:  /64   Pulse 72   Ht 5' 1\" (1.549 m)   Wt 175 lb (79.4 kg)   LMP 01/05/2010   BMI 33.07 kg/m²   Wt Readings from Last 3 Encounters:   04/16/21 175 lb (79.4 kg)   03/30/21 171 lb (77.6 kg)   03/29/21 173 lb 12.8 oz (78.8 kg)     Body mass index is 33.07 kg/m². GENERAL - Alert, oriented, pleasant, in no apparent distress,normal grooming  HEENT  pupils are intact, cornea intact, conjunctive normal color, ears are normal in exam,  Neck - Supple. No jugular venous distention noted. No carotid bruits, no apical -carotid delay  Respiratory:  Normal breath sounds, No respiratory distress, No wheezing, No chest tenderness. ,no use of accessory muscles, diaphragm movement is normal  Cardiovascular: (PMI) apex non displaced,no lifts no thrills, no s3,no s4, Normal heart rate, Normal rhythm, No murmurs, No rubs, No gallops. Carotid arteries pulse and amplitude are normal no bruit, no abdominal bruit noted ( normal abdominal aorta ausculation),   Extremities - No cyanosis, clubbing, or significant edema, no varicose veins    Abdomen  No masses, tenderness, or organomegaly, no hepato-splenomegally, no bruits  Musculoskeletal  No significant edema, no kyphosis or scoliosis, no deformity in any extremity noted, muscle strength and tone are normal  Skin: no ulcer,no scar,no stasis dermatitis   Neurologic  alert oriented times 3,Cranial nerves II through XII are grossly intact. There were no gross focal neurologic abnormalities.    Psychiatric: normal mood and affect    Lab Results   Component Value Date    CKTOTAL 33 11/22/2020    TROPONINI 0.007 03/25/2014     BNP:  No results found for: BNP  PT/INR:  No results found for: Adlyfe  Lab Results   Component Value Date    LABA1C 5.1 06/21/2019     Lab Results   Component Value Date    CHOL 166 11/23/2020    TRIG 111 11/23/2020    HDL 59 11/23/2020    LDLDIRECT 87 11/23/2020     Lab Results   Component Value Date    ALT 13 03/30/2021    AST 15 03/30/2021     TSH:  No results found for: TSH    Impression: Gibson De is a 62 y. o.year old who  has a past medical history of Anemia, Anxiety, Arthritis, Back pain at L4-L5 level, Bipolar 1 disorder (HCC), COPD (chronic obstructive pulmonary disease) ([facility] [address] Emphysema of lung ([facility] [address] FH: CAD (coronary artery disease), Fibromyalgia, Full dentures, Gastric ulcer, H/O Doppler ultrasound, H/O echocardiogram, History of blood transfusion, Hyperlipidemia LDL goal <100, Hypertension, Irregular heartbeat, Obstructive sleep apnea, On home oxygen therapy, Osteoarthritis, Pericardial effusion, Spinal stenosis, Thyroid disease, and Wears glasses. and presents with     Plan:  1. CHF, abdominal swelling and weight gain, continue lasix 40mg bid,  LAST YR RHC at [address] [place] [address] showed fluid overload with PCWP of 35,, she should lose 10 lbs, continuekdur 40meq and checked chem 7  ( K 4.7 and creatinine 0.9, howevber,  PROBNP was 140 which is normal), she  Believes, she is retaining water will add zaroxolyn 2.5mg daily and recheck chem 7 in 1 week, will refer to nephrology  2. HTN: continue toprol xl 25mg daily,  3. Bleeding gastritis and anemia: on aspirin, need to be carefull, see Dr. Douglas Moon  4. ? PAF, on aspirin, change cardizem to cardizem 120mg cd  5. Normal LHC  6. S/p pericardiocentesis  7. endstage  Lung disease: on home oxygen  8. Sinus tachycardia: seen by EP  In hospital, had SVT and sinus tachycardia and was started on cardizem, will continue it  9. End stage COPD: stable, continue inhalers, and steroids  10. Bipolar: stable  1. Fibromyalgia: stableHealth   All labs, medications and tests reviewed, continue all other medications of all above medical condition listed as is.     @[email or web address]@

## 2022-06-17 NOTE — CONSULT NOTE ADULT - CONSULT REASON
10/1/2021        RE: Kelly Gilmore  [address]  [facility] [address]        [facility] GERIATRIC SERVICES  PRIMARY CARE PROVIDER AND CLINIC:  No primary care provider on file., No primary physician on file.  Chief Complaint   Patient presents with     [facility] F/U      [place] Medical Record Number:  [id number]  Place of Service where encounter took place:  [facility] ([facility]) [[id number]]    Kelly Gilmore  is a 89 year old  (2/2/1927), admitted to the above facility from  [facility] . Hospital stay 9/20/21 through 9/29/21..   HPI:    Ms. Kelly Gilmore is a 89 y.o. female with a history of emphysema, ulcerative colitis, colectomy, ileostomy since 1990s who presented with abdominal pain, back pain, nausea, also ostomy also occasionally prolapsed, CT with abscess near ostomy, measures 3.7x3.6cm, started on IV Zosyn, the abscess was too small to drain, no indication for surgery, improving with ABX, having new shortness of breath with increased O2 requirements with at, electasis and fluid overload, patient was diuresed and CT PE done to rule out PE, O2 sats stable between 88-92%, discharged to TCU with 2 weeks of cefuroxime and metronidazole. She is to follow-up with Colorectal Surgery for repeat CT scan on 10/13/21.    Labs in hospital Na 127-138, K 3-4.3, H 7.6-8.4    Patient today oriented, lives in own home, fall in TCU on 9/30 no injury, /90, SLUMS 22/30, mild cognitive impairment, no distress, mild pain, also nausea probably from oral ABX, no distress.      CODE STATUS/ADVANCE DIRECTIVES DISCUSSION:  No Order  DNR only  ALLERGIES: No Known Allergies   PAST MEDICAL HISTORY: No past medical history on file.   PAST SURGICAL HISTORY:   has no past surgical history on file.  FAMILY HISTORY: family history is not on file.  SOCIAL HISTORY:     Patient's living condition: lives alone    Post Discharge Medication Reconciliation Status: discharge medications reconciled and changed,  "per note/orders  Current Outpatient Medications   Medication Sig     acetaminophen (TYLENOL) 500 MG tablet Take 2 tablets (1,000 mg) by mouth 3 times daily     albuterol (PROAIR HFA/PROVENTIL HFA/VENTOLIN HFA) 108 (90 Base) MCG/ACT inhaler Inhale 2 puffs into the lungs every 4 hours as needed for shortness of breath / dyspnea or wheezing     amLODIPine (NORVASC) 2.5 MG tablet Take 5 mg by mouth daily     cefuroxime (CEFTIN) 250 MG tablet Take 250 mg by mouth 2 times daily     clobetasol (TEMOVATE) 0.05 % CREA cream Apply topically 2 times daily     cyanocobalamin (VITAMIN B-12) 1000 MCG tablet Take 1,000 mcg by mouth daily     Fluticasone-Umeclidin-Vilanterol (TRELEGY ELLIPTA) 100-62.5-25 MCG/INH oral inhaler Inhale 1 puff into the lungs daily     ipratropium - albuterol 0.5 mg/2.5 mg/3 mL (DUONEB) 0.5-2.5 (3) MG/3ML neb solution Take 1 vial by nebulization every 6 hours as needed for shortness of breath / dyspnea or wheezing     losartan (COZAAR) 50 MG tablet Take 50 mg by mouth daily     metroNIDAZOLE (FLAGYL) 250 MG tablet Take 250 mg by mouth 2 times daily     MULTIPLE VITAMIN PO Take 1 tablet by mouth daily     ondansetron (ZOFRAN) 4 MG tablet Take 1 tablet (4 mg) by mouth every 8 hours as needed for nausea     rOPINIRole (REQUIP) 0.5 MG tablet Take 0.5 mg by mouth At Bedtime     sertraline (ZOLOFT) 100 MG tablet Take 150 mg by mouth daily     traMADol (ULTRAM) 50 MG tablet Take 50 mg by mouth 4 times daily as needed for severe pain     triamcinolone (KENALOG) 0.1 % external cream Apply topically 2 times daily as needed for irritation     Vitamin D, Cholecalciferol, 25 MCG (1000 UT) TABS Take 2 tablets by mouth daily     No current facility-administered medications for this visit.       ROS:  4 point ROS including Respiratory, CV, GI and , other than that noted in the HPI,  is negative    Vitals:  BP (!) 152/82   Pulse 97   Temp 97.5  F (36.4  C)   Resp 18   Ht 1.422 m (4' 8\")   Wt 42.6 kg (94 lb)   " SpO2 94%   BMI 21.07 kg/m    Exam:  GENERAL APPEARANCE:  in no distress, appears healthy, thin  ENT:  Mouth and posterior oropharynx normal, moist mucous membranes  RESP:  lungs clear to auscultation   CV:  no edema, rate-normal  ABDOMEN:  bowel sounds normal  M/S:   Gait and station abnormal transfer assist  SKIN:  Inspection of skin and subcutaneous tissue baseline  NEURO:   Examination of sensation by touch normal  PSYCH:  oriented X 3, memory impaired     Lab/Diagnostic data:  Recent labs in [facility] reviewed by me today.     ASSESSMENT/PLAN:    (K65.1) Intra-abdominal abscess (H)  (primary encounter diagnosis)  (Z93.2) Ileostomy in place (H)  (R11.0) Nausea  Comment: ANW 9/20-29, no surgical intervention  Plan:  -continue ceftin and flagyl x14 days  -follow up ANW CT scan on 10/13  -PT/OT to eval and treat  -change tramadol to 50mg QID PRN  -add APAP 1000mg TID scheduled  -increase amlodipine from 2.5 to 5mg   -CBC, BMP on 10/4  -start zofran 4mg TID PRN  -plan to follow up early next week RE: status and plan       (J43.1) Panlobular emphysema (H)  Comment: no cough, mild dimness R base  Plan: continue current pulmonary regimen    (N18.30) Stage 3 chronic kidney disease, unspecified whether stage 3a or 3b CKD (H)  Comment: creat in hospital in the 0.8-0.9 range  Plan: dose medication renally as possible  -BMP on 10/4    (F32.0) Mild depression (H)  Comment: with anxiety component, controlled  Plan: continue sertraline 150mg daily  -GDS/PHQ testing if symptomatic    (D64.9) Anemia, unspecified type  Comment: Hgb's in hospital 7.6-8.4, no s/s bleeding/bruising  Plan: CBC on 10/4        Electronically signed by:  SWETA Yousif CNP                       Sincerely,        SWETA Yousif CNP     stroke code

## 2022-06-17 NOTE — ED ADULT TRIAGE NOTE - CHIEF COMPLAINT QUOTE
pt c/o rapid heart that started about 45 min ago. pt noted her heart rate to be 170 at home. pt c/o palpitations.

## 2022-06-17 NOTE — CONSULT NOTE ADULT - SUBJECTIVE AND OBJECTIVE BOX
Stroke CODE consult Note:    NED JIMENES    1. Chief Complaint:    HPI: 27Y female with PMHX ?Lupus, negative stroke workup 2018, presents to the ED for chest palpitations with left sided UE weakness. C/o of headache, dizziness, neck pain as well. Patient had an incident in January where she was injured in the neck and left knee. Patient mentions she has a history of headaches, but never diagnosed with migraines. Her symptoms usually associated with headache, photosensitivity and nausea.       2. Relevant PMH:   Prior ischemic stroke/TIA[ ], Afib [ ], CAD [ ], HTN [ ], DLD [ ], DM [ ], PVD [ ], Obesity [ ],   Sedentary lifestyle [ ], CHF [ ], CYNTHIA [ ], Cancer Hx [ ].    3. Social History: Smoking [ ], Drug Use [ ], Alcohol Use [ ], Other [ ]    4. Possible Location of Stroke:    5. Relevant Brain Tissue Imaging:  < from: CT Brain Stroke Protocol (22 @ 22:17) >  IMPRESSION:    No evidence of acute intracranial hemorrhage or large territorial infarct.    Findings were discussed with physician Wiley at 2022 10:24 PM.    < end of copied text >    6. Relevant Cerebrovascular Imagin. Relevant blood tests:                          14.9   8.60  )-----------( 241      ( 2022 21:59 )             41.4       135  |  98  |  18  ----------------------------<  115<H>  4.1   |  23  |  0.9    Ca    9.5      2022 21:59  Mg     2.0         TPro  7.8  /  Alb  4.5  /  TBili  0.2  /  DBili  <0.2  /  AST  19  /  ALT  20  /  AlkPhos  97      8. Relevant cardiac rhythm monitorin. Relevant Cardiac Structure: (TTE/KATARZYNA +/-):[ ]No intracardiac thrombus/[ ] no vegetation/[ ]no akynesia/EF:    Home Medications:  meloxicam:  (03 Oct 2019 10:41)      MEDICATIONS  (STANDING):      10. PT/OT/Speech/Rehab/S&Sw/ Cognitive eval results and recommendations:    11. Exam:    Vital Signs Last 24 Hrs  T(C): 36.1 (2022 21:30), Max: 36.1 (2022 19:58)  T(F): 97 (2022 21:30), Max: 97 (2022 21:30)  HR: 95 (2022 21:30) (95 - 115)  BP: 131/86 (2022 21:30) (131/86 - 142/95)  BP(mean): --  RR: 18 (2022 21:30) (18 - 20)  SpO2: 99% (2022 21:30) (98% - 99%)    12.   NIH STROKE SCALE  Item	                                                        Score  1 a.	Level of Consciousness	               	0  1 b. LOC Questions	                                0  1 c.	LOC Commands	                               	0  2.	Best Gaze	                                        0  3.	Visual	                                                0  4.	Facial Palsy	                                        0  5 a.	Motor Arm - Left	                                0  5 b.	Motor Arm - Right	                        0  6 a.	Motor Leg - Left	                                0  6 b.	Motor Leg - Right	                                0  7.	Limb Ataxia	                                        0  8.	Sensory	                                                0  9.	Language	                                        0  10.	Dysarthria	                                        0  11.	Extinction and Inattention  	        0  ______________________________________  TOTAL	                                                        0    Total NIHSS on admission:      NIHSS yesterday:      NIHSS today: 0  Gait: Narrow   mRS:  0 No symptoms at all  1 No significant disability despite symptoms; able to carry out all usual duties and activities without assistance  2 Slight disability; unable to carry out all previous activities, but able to look after own affairs  3 Moderate disability; requiring some help, but able to walk without assistance  4 Moderately severe disability; unable to walk without assistance and unable to attend to own bodily needs without assistance  5 Severe disability; bedridden, incontinent and requiring constant nursing care and attention  6 Dead     Stroke CODE consult Note:    NED JIMENES    1. Chief Complaint:    HPI: 27Y female with PMHX ?Lupus, negative stroke workup 2018, presents to the ED for chest palpitations with left sided UE weakness. C/o of headache, dizziness, neck pain as well. Patient had an incident in January where she was injured in the neck and left knee. Patient mentions she has a history of headaches, but never diagnosed with migraines. Her symptoms usually associated with headache, photosensitivity and nausea.       2. Relevant PMH:   Prior ischemic stroke/TIA[ ], Afib [ ], CAD [ ], HTN [ ], DLD [ ], DM [ ], PVD [ ], Obesity [ ],   Sedentary lifestyle [ ], CHF [ ], CYNTHIA [ ], Cancer Hx [ ].    3. Social History: Smoking [ ], Drug Use [ ], Alcohol Use [ ], Other [ ]    4. Possible Location of Stroke:    5. Relevant Brain Tissue Imaging:  < from: CT Brain Stroke Protocol (22 @ 22:17) >  IMPRESSION:    No evidence of acute intracranial hemorrhage or large territorial infarct.    Findings were discussed with physician Wiley at 2022 10:24 PM.    < end of copied text >    6. Relevant Cerebrovascular Imagin. Relevant blood tests:                          14.9   8.60  )-----------( 241      ( 2022 21:59 )             41.4       135  |  98  |  18  ----------------------------<  115<H>  4.1   |  23  |  0.9    Ca    9.5      2022 21:59  Mg     2.0         TPro  7.8  /  Alb  4.5  /  TBili  0.2  /  DBili  <0.2  /  AST  19  /  ALT  20  /  AlkPhos  97      8. Relevant cardiac rhythm monitorin. Relevant Cardiac Structure: (TTE/KATARZYNA +/-):[ ]No intracardiac thrombus/[ ] no vegetation/[ ]no akynesia/EF:    Home Medications:  meloxicam:  (03 Oct 2019 10:41)      MEDICATIONS  (STANDING):      10. PT/OT/Speech/Rehab/S&Sw/ Cognitive eval results and recommendations:    11. Exam:    Vital Signs Last 24 Hrs  T(C): 36.1 (2022 21:30), Max: 36.1 (2022 19:58)  T(F): 97 (2022 21:30), Max: 97 (2022 21:30)  HR: 95 (2022 21:30) (95 - 115)  BP: 131/86 (2022 21:30) (131/86 - 142/95)  BP(mean): --  RR: 18 (2022 21:30) (18 - 20)  SpO2: 99% (2022 21:30) (98% - 99%)    12.   NIH STROKE SCALE  Item	                                                        Score  1 a.	Level of Consciousness	               	0  1 b. LOC Questions	                                0  1 c.	LOC Commands	                               	0  2.	Best Gaze	                                        0  3.	Visual	                                                0  4.	Facial Palsy	                                        0  5 a.	Motor Arm - Left	                                0  5 b.	Motor Arm - Right	                        0  6 a.	Motor Leg - Left	                                0  6 b.	Motor Leg - Right	                                0  7.	Limb Ataxia	                                        0  8.	Sensory	                                                0  9.	Language	                                        0  10.	Dysarthria	                                        0  11.	Extinction and Inattention  	        0  ______________________________________  TOTAL	                                                        0    Total NIHSS on admission:      NIHSS yesterday:      NIHSS today: 0  Gait: Narrow   mRS:  0 No symptoms at all  1 No significant disability despite symptoms; able to carry out all usual duties and activities without assistance  2 Slight disability; unable to carry out all previous activities, but able to look after own affairs  3 Moderate disability; requiring some help, but able to walk without assistance  4 Moderately severe disability; unable to walk without assistance and unable to attend to own bodily needs without assistance  5 Severe disability; bedridden, incontinent and requiring constant nursing care and attention  6 Dead    < from: CT Angio Head w/ IV Cont (22 @ 22:39) >    IMPRESSION:    No intracranial large vessel filling defect or small aneurysm.    No cervical carotid orvertebral artery stenosis.        ******PRELIMINARY REPORT******      ******PRELIMINARY REPORT******         < end of copied text >

## 2022-06-17 NOTE — CONSULT NOTE ADULT - ASSESSMENT
Assessment:27Y female with PMHX ?Lupus, negative stroke workup 2018, presents to the ED for chest palpitations with left sided UE weakness. C/o of headache, dizziness, neck pain as well. Patient had an incident in January where she was injured in the neck and left knee. Patient mentions she has a history of headaches, but never diagnosed with migraines. Her symptoms usually associated with headache, photosensitivity and nausea. Patient denies taking any blood thinners. Not taking any bcp. CTH-negative for acute findings. Discussed case with Dr. Wylie, not tpa candidate NIH-0.  CTA head/neck were performed-pending results.    #Most likely due to migraine, r/o ischemic event    Suggestions:  MRI brain non contrast, if negative then f/u outpatient neurology  Pain management  Cardiac workup   Dispo as per ED  Discussed with Dr. West. Pending note to follow  Assessment:27Y female with PMHX ?Lupus, negative stroke workup 2018, presents to the ED for chest palpitations with left sided UE weakness. C/o of headache, dizziness, neck pain as well. After CT scan, patient states her left arm is feeling better but still endorses headache. Patient had an incident in January where she was injured in the neck and left knee. Patient mentions she has a history of headaches, but never diagnosed with migraines. Her symptoms usually associated with headache, photosensitivity and nausea. Patient denies taking any blood thinners. Not taking any bcp. CTH-negative for acute findings. Discussed case with Dr. Wylie, not tpa candidate NIH-0.  CTA head/neck were performed-pending results.    #Most likely due to migraine, r/o ischemic event    Suggestions:  MRI brain non contrast, if negative then f/u outpatient neurology  Pain management  Cardiac workup   Dispo as per ED  Discussed with Dr. West. Pending note to follow  Assessment:27Y female with PMHX ?Lupus, negative stroke workup 2018, presents to the ED for chest palpitations with left sided UE weakness. C/o of headache, dizziness, neck pain as well. After CT scan, patient states her left arm is feeling better but still endorses headache. Patient had an incident in January where she was injured in the neck and left knee. Patient mentions she has a history of headaches, but never diagnosed with migraines. Her symptoms usually associated with headache, photosensitivity and nausea. Patient denies taking any blood thinners. Not taking any bcp. CTH-negative for acute findings. Discussed case with Dr. Wylie, not tpa candidate NIH-0.  CTA head/neck were performed-pending results.    #Most likely due to migraine, r/o ischemic event    Suggestions:  MRI brain non contrast, if negative then f/u outpatient neurology  f/u cta/head/neck  Pain management  Cardiac workup   Dispo as per ED  Discussed with Dr. West. Pending note to follow

## 2022-06-18 VITALS
HEART RATE: 81 BPM | OXYGEN SATURATION: 96 % | DIASTOLIC BLOOD PRESSURE: 65 MMHG | SYSTOLIC BLOOD PRESSURE: 106 MMHG | RESPIRATION RATE: 18 BRPM | TEMPERATURE: 98 F

## 2022-06-18 LAB
SARS-COV-2 RNA SPEC QL NAA+PROBE: SIGNIFICANT CHANGE UP
TROPONIN T SERPL-MCNC: <0.01 NG/ML — SIGNIFICANT CHANGE UP

## 2022-06-18 PROCEDURE — 99236 HOSP IP/OBS SAME DATE HI 85: CPT

## 2022-06-18 PROCEDURE — 71045 X-RAY EXAM CHEST 1 VIEW: CPT | Mod: 26

## 2022-06-18 PROCEDURE — 93010 ELECTROCARDIOGRAM REPORT: CPT

## 2022-06-18 NOTE — ED CDU PROVIDER INITIAL DAY NOTE - OBJECTIVE STATEMENT
Patient presented to ED for Chest pain/palpitations/Rt sided neck pain with LUE weakness. Stroke code was activated. See the consult notes. Patient remained stable in ED and her LUE weakness improved significantly during the course of the ED.

## 2022-06-18 NOTE — ED PROVIDER NOTE - PHYSICAL EXAMINATION
Patient LUE motor strength is 4/5  No other CNS deficits noted. Patient LUE motor strength is 4/5  No other neuro deficits noted.

## 2022-06-18 NOTE — ED CDU PROVIDER DISPOSITION NOTE - PROVIDER TOKENS
PROVIDER:[TOKEN:[[id number]],FOLLOWUP:[7-10 Days]],PROVIDER:[TOKEN:[[id number]],FOLLOWUP:[4-6 Days],ESTABLISHEDPATIENT:[T]]

## 2022-06-18 NOTE — ED CDU PROVIDER DISPOSITION NOTE - NSFOLLOWUPINSTRUCTIONS_ED_ALL_ED_FT
1. FOLLOW UP WITH DR PENNINGTON OF NEUROLOGY WITHIN 2 WEEKS  2. FOLLOW UP WITH DR AMARO THIS WEEK TO DISCUSS POSSIBILITY OF LONGER-TERM EVENT MONITORING  3. RETURN TO THE ED FOR PERSISTENT OR WORSENING SYMPTOMS    Palpitations    A palpitation is the feeling that your heartbeat is irregular or is faster than normal. It may feel like your heart is fluttering or skipping a beat. They may be caused by many things, including smoking, caffeine, alcohol, stress, and certain medicines. Although most causes of palpitations are not serious, palpitations can be a sign of a serious medical problem. Avoid caffeine, alcohol, and tobacco products at home. Try to reduce stress and anxiety and make sure to get plenty of rest.     SEEK IMMEDIATE MEDICAL CARE IF YOU HAVE ANY OF THE FOLLOWING SYMPTOMS: chest pain, shortness of breath, severe headache, dizziness/lightheadedness, or fainting.    Headache    A headache is pain or discomfort felt around the head or neck area. The specific cause of a headache may not be found as there are many types including tension headaches, migraine headaches, and cluster headaches. Watch your condition for any changes. Things you can do to manage your pain include taking over the counter and prescription medications as instructed by your health care provider, lying down in a dark quiet room, limiting stress, getting regular sleep, and refraining from alcohol and tobacco products.    SEEK IMMEDIATE MEDICAL CARE IF YOU HAVE ANY OF THE FOLLOWING SYMPTOMS: fever, vomiting, stiff neck, loss of vision, problems with speech, muscle weakness, loss of balance, trouble walking, passing out, or confusion.

## 2022-06-18 NOTE — ED PROVIDER NOTE - CARE PLAN
1 Principal Discharge DX:	TIA (transient ischemic attack)  Secondary Diagnosis:	Palpitations  Secondary Diagnosis:	Chest pain

## 2022-06-18 NOTE — ED ADULT NURSE REASSESSMENT NOTE - NS ED NURSE REASSESS COMMENT FT1
Pt assessed A/O times 4 Vs stable on cardiac monitor denies any pain no SOB no N/V no dizziness, denies palpitation ,,denies weakness no palpitation ,  with order for MRI ready to be transport with transporter

## 2022-06-18 NOTE — ED ADULT NURSE REASSESSMENT NOTE - NS ED NURSE REASSESS COMMENT FT1
Assumed care of pt placed on OBS 2* left sided UE weakness and palpitations. Pt here to r/o TIA.  AOx4, Neg SOB, VSS. Pt had stroke code called/cancelled prior. Pt able to make needs known; ambulatory with assistance. Pt may have hx PFO and Lupus; was not endorsed by patient. Pt also has hx migraines. Pt pending MRI in am. Pt denies needs at this time. Pt safety and comfort continues to be maintained. Will continue to monitor.

## 2022-06-18 NOTE — ED PROVIDER NOTE - OBJECTIVE STATEMENT
Patient states that, yesterday started having pain in the Lt side ribs, center of the chest, which continued on-off. Patient started having palpitations today, followed by Rt sided neck pain, severe, which progressed, and started feeling LUE weak, so was brought to ED. Patient denies trauma, denies f/c/n/v/abd pain. Denies URI symptoms. Patient states that, she had stroke in the past, with Lt sided numbness, had been admitted to hospital at that time, was in the hospital for 8 days, was told has bubbles in the heart. Patient had follow ups after that, and improved well. Patient measured her HR today while having palpitations, and HR was in 170's. Patient was also feeling lightheaded at that time. Patient states that, yesterday started having pain in the Lt side ribs, center of the chest, which continued on-off. Patient started having palpitations today, followed by Rt sided neck pain, severe, which progressed, and started feeling LUE weak, so was brought to ED. Patient denies trauma, denies f/c/n/v/abd pain. Denies URI symptoms. Patient states that, she had stroke in the past, with Lt sided numbness, had been admitted to hospital at that time, was in the hospital for 8 days, was told has bubbles in the heart. Patient had follow ups after that, and improved well. Patient measured her HR today while having palpitations, and HR was in 170's. Patient was also feeling lightheaded at that time. Denies syncope/presyncope. Denies lower ext pain/swelling.

## 2022-06-18 NOTE — ED ADULT NURSE REASSESSMENT NOTE - NS ED NURSE REASSESS COMMENT FT1
Pt resting comfortably in bed at this time. Pt VSS. Call bell education provided and within reach. Pt denies needs at this time. Pt in agreement with current plan of care.  Pt safety and comfort continues to be maintained. Pt sister to come speak with this RN regarding pt care. Will continue to monitor at this time.

## 2022-06-18 NOTE — ED PROVIDER NOTE - CLINICAL SUMMARY MEDICAL DECISION MAKING FREE TEXT BOX
Patient with Rt sided neck pain with LUE weakness, Code stroke was activated due to the LUE weakness. Patient symptoms improved during the course of the ED stay, and LUE weakness improved significantly. Patient had no other neuro deficits. Consulted Tele Psychiatrist on call, and patient is not a tPA candidate due to low NIH score and improving symptoms. Patient was evaluated by neurology, as recommended, is admitted to EDOU for further evaluation and care. Discussed with patient and she agreed.

## 2022-06-18 NOTE — ED CDU PROVIDER DISPOSITION NOTE - CARE PROVIDER_API CALL
Dony West)  Neuromuscular Medicine  [address] [place] [address], [address]  [place], [place] [id number]  Phone: [phone]  Fax: [phone]  Follow Up Time: 7-10 Days    Jermaine Strong)  Cardiology; Interventional Cardiology  [address] [place] [address]  Phone: [phone]  Fax: [phone]  Established Patient  Follow Up Time: 4-6 Days

## 2022-06-18 NOTE — ED CDU PROVIDER DISPOSITION NOTE - CLINICAL COURSE
27-year-old woman was placed in CDU for work-up of unilateral weakness associated with headache and palpitations.  Stroke code was called in the ED, initial work-up with CT/CTA head and neck was unremarkable.  Patient was seen by neuro and initial plan was for MRI, however, patient has hair extensions which have been attached with metal, unable to remove.  Symptoms resolved in the ED, and on my eval patient is asymptomatic.  She reports that symptoms began with palpitations, she has had some work-up previously for this but no etiology determined.  Patient has had some cardiac work-up with echo and event monitor short-term in the past.  Has a cardiologist.  I doubt CVA given this history.  Could be atypical migraine.  Also doubt primary cardiac etiology.  Spoke with Dr. West, patient can follow-up as an outpatient, also advised follow-up with  to pursue longer-term event monitor as symptoms begin always with palpitations.  Patient is comfortable with discharge and will follow-up.  Return precautions discussed.

## 2022-06-18 NOTE — ED CDU PROVIDER DISPOSITION NOTE - PATIENT PORTAL LINK FT
You can access the FollowMyHealth Patient Portal offered by [facility] by registering at the following website: [email or web address]. By joining [facility]’s FollowMyHealth portal, you will also be able to view your health information using other applications (apps) compatible with our system.

## 2022-06-27 ENCOUNTER — APPOINTMENT (OUTPATIENT)
Dept: NEUROLOGY | Facility: CLINIC | Age: 27
End: 2022-06-27
Payer: OTHER MISCELLANEOUS

## 2022-06-27 VITALS
HEIGHT: 69 IN | SYSTOLIC BLOOD PRESSURE: 114 MMHG | WEIGHT: 220 LBS | DIASTOLIC BLOOD PRESSURE: 79 MMHG | HEART RATE: 105 BPM | BODY MASS INDEX: 32.58 KG/M2

## 2022-06-27 PROCEDURE — 99072 ADDL SUPL MATRL&STAF TM PHE: CPT

## 2022-06-27 PROCEDURE — 99213 OFFICE O/P EST LOW 20 MIN: CPT | Mod: PA

## 2022-06-27 NOTE — PHYSICAL EXAM
[FreeTextEntry1] : Patient was alert and oriented to person time and place, she was coherent and relevant and appropriate.  She is a brace on her left wrist as well as on her left foot.
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
## 2022-06-27 NOTE — ASSESSMENT
[FreeTextEntry1] : Patient continues in the care of orthopedics for her left wrist as well as her left lower extremity and his primary limiting factors at this point in time.  She continues to be unable to work secondary to his disability.  She is awaiting authorization for epidural steroid injections with pain management and will continue on her care.  She was invited to follow up in neurology as needed.

## 2022-08-17 ENCOUNTER — APPOINTMENT (OUTPATIENT)
Dept: PAIN MANAGEMENT | Facility: CLINIC | Age: 27
End: 2022-08-17

## 2022-08-17 PROCEDURE — 99072 ADDL SUPL MATRL&STAF TM PHE: CPT

## 2022-08-17 PROCEDURE — 99075 MEDICAL TESTIMONY: CPT

## 2022-08-18 ENCOUNTER — NON-APPOINTMENT (OUTPATIENT)
Age: 27
End: 2022-08-18

## 2022-08-23 ENCOUNTER — NON-APPOINTMENT (OUTPATIENT)
Age: 27
End: 2022-08-23

## 2022-08-23 ENCOUNTER — APPOINTMENT (OUTPATIENT)
Dept: ORTHOPEDIC SURGERY | Facility: CLINIC | Age: 27
End: 2022-08-23

## 2022-08-23 VITALS — BODY MASS INDEX: 32.58 KG/M2 | HEIGHT: 69 IN | WEIGHT: 220 LBS

## 2022-08-23 PROCEDURE — 99072 ADDL SUPL MATRL&STAF TM PHE: CPT

## 2022-08-23 PROCEDURE — 99213 OFFICE O/P EST LOW 20 MIN: CPT
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
## 2022-08-23 NOTE — REASON FOR VISIT
[FreeTextEntry2] : patient here for re-evaluation of her work injury of 1/28/22 to her left knee and foot Neck back and left wrist still wearing the boot on the left foot and the left wrist brace   she has seen neurology and pain management   still taking ibuprofen and Flexeril therapy ended still not working   Complaining of some cramping and spasms in her toes

## 2022-08-23 NOTE — ASSESSMENT
[FreeTextEntry1] : \par I would like to get her out of the  CAM boot please put a request in to continue therapy the cervical lumbar spine left\par ankle and left shoulder ibuprofen and flexeril were refilled if she does get nerve test done I asked that a copy be provided she do\par appear quite stressed,  clinically I sense that she may have PTSD and would benefit from some psychological\par assistance please put a request in to worker's comp for a psychological evaluation I will see her in  3 months at t\par time she is totally disabled unable to work follow-up with pain management   if the left  knee pain does not improve I might consider an x-ray

## 2022-08-23 NOTE — IMAGING
[de-identified] : Cervical spine some spasm diffuse tenderness\par \par Lumbar spine spasm limited motion in all planes tested positive straight leg bilaterally\par \par  left wrist diffuse tenderness\par \par  left knee good motion tenderness mediolateral joint line minimal swelling\par \par  left ankle boot removed  tenderness on both medial lateral side mild swelling negative anterior drawer Achilles intact

## 2022-08-25 ENCOUNTER — APPOINTMENT (OUTPATIENT)
Dept: ORTHOPEDIC SURGERY | Facility: CLINIC | Age: 27
End: 2022-08-25

## 2022-08-25 PROCEDURE — 99072 ADDL SUPL MATRL&STAF TM PHE: CPT

## 2022-08-25 PROCEDURE — 99075 MEDICAL TESTIMONY: CPT

## 2022-11-07 ENCOUNTER — APPOINTMENT (OUTPATIENT)
Dept: ORTHOPEDIC SURGERY | Facility: CLINIC | Age: 27
End: 2022-11-07

## 2022-12-19 ENCOUNTER — APPOINTMENT (OUTPATIENT)
Dept: ORTHOPEDIC SURGERY | Facility: CLINIC | Age: 27
End: 2022-12-19

## 2022-12-19 PROCEDURE — 99072 ADDL SUPL MATRL&STAF TM PHE: CPT

## 2022-12-19 PROCEDURE — 99213 OFFICE O/P EST LOW 20 MIN: CPT

## 2022-12-19 PROCEDURE — 73562 X-RAY EXAM OF KNEE 3: CPT | Mod: LT

## 2022-12-19 NOTE — ASSESSMENT
[FreeTextEntry1] : \par   recommended therapy of  the cervical lumbar spine left\par ankle and left shoulder ibuprofen and flexeril were refilled  I asked that a copy  of the nerve tests be provided she do\par appear quite stressed,  clinically I sense that she may have PTSD and would benefit from some psychological\par assistance please put a request in to worker's comp for a psychological evaluation I will see her in  3 months at t\par time she is totally disabled unable to work follow-up with pain management    recommended MRI of the left knee suggested she use a cane

## 2022-12-19 NOTE — HISTORY OF PRESENT ILLNESS
[de-identified] : History of Present Illness\par 27-year-old female comes in today for a follow-up evaluation of her work related injury January 28, 2022. Patient states\par that she was in the stock room at [employer], she works in the cosmetics department, she was unloading boxes in the\par stock room, one of her colleagues was on a Pallet Scotty which ran over her left lower leg. She fell onto the left arm, left\par hand and as well started to develop pain on the right elbow forearm wrist and hand. She went to [facility] and was sent\par to [facility], she is currently in a Cam boot on the left leg and a left wrist brace\par she has seen pain management and neurology for her cervical thoracic and lumbar pain Neck pain like a squeezing\par pressure. when she turns her neck she feels noise inside she some headaches has radiating symptoms in the arms and\par legs some of symptoms worsened \par taking a muscle relaxant made her drowsy\par she is taking ibuprofen. She has had 20 visits of therapy for the neck and back therapy sessions ended   RHD is currently\par out work since the accident neurology made recommendations cervical lumbar spine MRI's and nerve tests upper lower\par extremities\par she reports having several MRIs done does not feel that the therapy is improved any of her pain she is quite anxious\par and appears stressed still using the boot on the left leg brace on the left wrist\par  recently complaining of pain in the left knee

## 2022-12-19 NOTE — IMAGING
[de-identified] : Cervical spine some spasm diffuse tenderness\par \par Lumbar spine spasm limited motion in all planes tested positive straight leg bilaterally\par \par  left wrist diffuse tenderness\par \par  left knee good motion tenderness mediolateral joint line minimal swelling\par \par X-rays left knee unremarkable\par \par  left ankle  tenderness on both medial lateral side mild swelling negative anterior drawer Achilles intact

## 2022-12-19 NOTE — REASON FOR VISIT
[FreeTextEntry2] : patient here for re-evaluation of her work injury of 1/28/22 to her left knee and foot Neck back and left wrist   out of the Cam boot still using the wrist brace seeing pain management still taking Flexeril and  ibuprofen reports pain around the rib cage seeing pain management not doing therapy did have nerve test done June 20, 2022 still reports cramping in the toes of both feet

## 2022-12-20 ENCOUNTER — FORM ENCOUNTER (OUTPATIENT)
Age: 27
End: 2022-12-20

## 2023-01-29 NOTE — H&P PST ADULT - NS PRO ABUSE SCREEN AFRAID ANYONE YN
Pt. Pain free.  States is \"feeling better\".  Playful in room.   Given PO challenge at this time      no

## 2023-02-23 ENCOUNTER — APPOINTMENT (OUTPATIENT)
Dept: PAIN MANAGEMENT | Facility: CLINIC | Age: 28
End: 2023-02-23
Payer: OTHER MISCELLANEOUS

## 2023-02-23 VITALS — HEIGHT: 69 IN | WEIGHT: 220 LBS | BODY MASS INDEX: 32.58 KG/M2

## 2023-02-23 PROCEDURE — 99072 ADDL SUPL MATRL&STAF TM PHE: CPT

## 2023-02-23 PROCEDURE — 99213 OFFICE O/P EST LOW 20 MIN: CPT | Mod: ACP

## 2023-02-23 NOTE — HISTORY OF PRESENT ILLNESS
[FreeTextEntry1] : Ms. Stanley is a 27 year-old woman complaining of neck, back, left leg pain sustained in a work related injury on January 28, 2022. 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 Her colleague was on a pallet mary which ran over her left lower leg. She fell onto the left arm, left hand, and started to develop pain to her neck and lower back. Patient has finished her sessions of physical therapy for her neck and lower back which provided no sustained relief. She is going to begin PT for her left leg. Neck pain radiates to her left arm. She states she has limited ROM when turning her neck left and right. It tends to crack when moving it. Back pain radiates downwards to left leg. Bending forward hurts more than backwards. Neck pain = Back pain but she would like to proceed with treatment of the neck first. She has difficulty eating. She also has migraines and tends to wake up with severe headaches. She is wearing a Cam boot on the left leg and left wrist brace. EMG was performed 2 days ago and does not have results with her. She takes cyclobenzaprine and ibuprofen for pain. \par \par The pain started after an injury at work. Patient describes pain as severe rated at a 10/10. During the last month the pain has been constant with symptoms worsening in no typical pattern. Pain described as burning, sharp, pressure-like, cramping, dull aching, throbbing, shooting or cutting. Pain is associated with numbness/pins and needles. Pain is increased with lying down, standing, sitting, walking, exercise, relaxation, coughing sneezing or bowel movements. Bowel or bladder habits have changed.\par \par ACTIVITIES: Patient could walk 0 blocks before the pain starts. Patient can stand 15-20 minutes before pain starts. The patient often, constantly lies down because of pain. Patient uses no assisted walking device at this time. Patient has difficulty performing household chores, going to work, doing yardwork or shopping, socializing with friends, participating in recreational activities or exercising at this time.\par \par PRIOR PAIN TREATMENTS: Moderate relief with psychotherapy. \par \par Prior Pain Medications: Tylenol\par \par \par Today: I had the pleasure of seeing NED STANLEY in the office today. Previous history and physical exam findings have been reviewed. \par \par She is currently under our care for neck, back, and left leg pain which she is receiving active care for. Today her pain remains unchanged. Her neck pain has been the most severe at a 9/10 on the pain scale most days. She is unable to preform her ADL's and work around the house due to her pain. She says she has been reliant on her  to preform her daily tasks. She is on a current medication regimen of Cyclobenzaprine HCL 10mg thee times daily and Ibuprofen 600mg three times daily from her Orthopedic provider. She wishes to follow-up with ortho and continue to do physical therapy before she proceeds with a Cervical injection. She is comfortable with her current care plan.\par

## 2023-02-23 NOTE — REVIEW OF SYSTEMS
[Arthralgia] : arthralgia [Joint Pain] : joint pain [Joint Stiffness] : joint stiffness [Negative] : Constitutional

## 2023-02-23 NOTE — ASSESSMENT
[FreeTextEntry1] : Patient is a 27 year-old woman presenting today complaining of neck, back, left leg pain sustained in a work related injury on January 28, 2022. Pain is severe. Patient is presenting with radicular pain with impairment in ADLs and functionality. The pain has not responded to conservative care, including medications, stretching, as well as active modalities, such as physical therapy. Imaging studies as well as physical exam findings corroborate the symptomatology and radicular pain. Patient would like to continue her muscle relaxants, NSAIDs, and schedule a JAMARCUS during her next visit if her pain continues. Pt. is following up with Ortho in the interim. She will f/u in 4 weeks for further evaluation.\par \par I personally reviewed with the PA, this patient's history and physical exam findings, as documented above. I have discussed the relevant areas of concern, having direct implications to the presenting problems and illnesses, and I have personally examined all pertinent and positive and negative findings, which impact on the prior pain \par management treatment.\par \par \par Brent Sierra PA-C\par Maribel Rios MD\par \par

## 2023-02-23 NOTE — PHYSICAL EXAM
[de-identified] : Neck: Palpation of the cervical spine is as follows: bilateral trapezial tenderness and bilateral paracervical tenderness. Range of motion of the cervical spine is as follows: diminished range of motion in all planes Forward flexion is 10. Extension is 10. Left lateral rotation is 25. Right lateral rotation is 25. Strength Testing for the cervical spine is as follows: 4/5 strength in the UEs Neurological testing for the cervical spine is as follows: Deep tendon reflexes LEFT reveals Biceps Reflex Diminished, Triceps Reflex Diminished and brachioradialis Reflex Diminished. Deep tendon reflexes RIGHT reveals biceps reflex diminished, triceps reflex diminished and brachioradialis reflex diminished. Reflexes are diminished \par \par Back, including spine: Inspection of the thoracic/lumbar spine is as follows: Wearing a Cam boot on left leg.  Palpation of the thoracic/lumbar spine is as follows: bilateral lumbar paraspinal spasm. Range of motion of the thoracic and lumbar spine is as follows: Lumbar Forward Flexion 45 degrees. Lumbar Extension 10 degrees. Strength Testing of the thoracic and lumbar spine is as follows: 4/5 strength in the UEs Special testing of the thoracic and lumbar spine is as follows: Positive sitting straight leg raise on the left. Gait and function is as follows: patient ambulates without assistive device and antalgic gait.

## 2023-02-27 NOTE — STROKE CODE NOTE - TIME PATIENT LAST KNOWN WELL
INSURANCE COVERAGE REGARDING PAYMENT  FOR YOUR COLONOSCOPY        Colon Cancer is the second leading cause of death among cancers, per the American Cancer Society. It is preventable. Early detection is the key. Your doctor will determine which tests need to be done for prevention and/or treatment. However, colonoscopies can be performed for several reasons:     Screening To screen for any problems within the colon. In this case, the patient is not symptomatic and does not have a personal history of previous colon cancer/condition or abnormal findings. Billed as screening.   Surveillance To monitor for a previously diagnosed colon condition (such as polyps) or when performed at more frequent intervals than every ten years because the patient has a personal/family history of colonic polyps or colon cancer. Billed as diagnostic.   Diagnostic Patient with symptoms, used to evaluate the colon. Billed as diagnostic.       If during a screening colonoscopy, our physician finds an abnormality, performs a biopsy or polypectomy (removal of polyp), your insurance company may consider the procedure to be a diagnostic exam and no longer a screening procedure.     Every insurance company is different. We encourage you to call your insurance company regarding plan benefits. Generally, screening benefits and diagnostic benefits may be paid at different levels. Charges associated with anesthesia or type of facility may also be processed differently. This varies with each insurance company, so we want you to be aware of this prior to your procedure. You do not have to call your insurance company if you have Medicare. For an estimate of potential charges, you may call the [facility] Patient Contact Center at [phone], option 5.     The authorization staff at [facility] will contact your insurance company to check precertification requirements for your colonoscopy. However, precertification, which serves as notification 
is never a guarantee of payment. If you have questions regarding precertification for your procedure, please contact your insurance company.  
23:30
09:40

## 2023-03-13 ENCOUNTER — APPOINTMENT (OUTPATIENT)
Dept: ORTHOPEDIC SURGERY | Facility: CLINIC | Age: 28
End: 2023-03-13
Payer: OTHER MISCELLANEOUS

## 2023-03-13 PROCEDURE — 99213 OFFICE O/P EST LOW 20 MIN: CPT

## 2023-03-13 PROCEDURE — 99072 ADDL SUPL MATRL&STAF TM PHE: CPT

## 2023-03-13 NOTE — IMAGING
[de-identified] : Cervical spine some spasm diffuse tenderness\par \par Lumbar spine spasm limited motion in all planes tested positive straight leg bilaterally\par \par  left wrist diffuse tenderness\par  right hand diffusely tender decreased \par \par  left knee good motion tenderness mediolateral joint line minimal swelling\par \par X-rays left knee unremarkable\par \par  left ankle  tenderness on both medial lateral side mild swelling negative anterior drawer Achilles intact

## 2023-03-13 NOTE — ASSESSMENT
[FreeTextEntry1] : \par   recommended therapy of  the cervical lumbar spine,\par left ankle and left shoulder ibuprofen and flexeril were refilled  I asked that a copy  of the nerve tests be provided she does\par appear quite stressed,  clinically I sense that she may have PTSD and would benefit from some psychological\par assistance please put a request in to worker's comp for a psychological evaluation I will see her in  3 months at t\par time she is totally disabled unable to work follow-up with pain management    again recommended MRI of the left knee suggested she use a cane

## 2023-03-13 NOTE — HISTORY OF PRESENT ILLNESS
[de-identified] : History of Present Illness\par 27-year-old female comes in today for a follow-up evaluation of her work related injury January 28, 2022. 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 She fell onto the left arm, left\par hand and as well started to develop pain on the right elbow forearm wrist and hand. She went to [facility] and was sent\par to [facility], she is currently in a Cam boot on the left leg and a left wrist brace\par she has seen pain management and neurology for her cervical thoracic and lumbar pain Neck pain like a squeezing\par pressure. when she turns her neck she feels noise inside she some headaches has radiating symptoms in the arms and\par legs some of symptoms worsened \par taking a muscle relaxant made her drowsy\par she is taking ibuprofen. She has had 20 visits of therapy for the neck and back therapy sessions ended   RHD is currently\par out work since the accident neurology made recommendations cervical lumbar spine MRI's and nerve tests upper lower\par extremities\par she reports having several MRIs done does not feel that the therapy is improved any of her pain she is quite anxious\par and appears stressed still using the boot on the left leg brace on the left wrist\par  recently complaining of pain in the left knee

## 2023-03-13 NOTE — REASON FOR VISIT
[Spouse] : spouse [FreeTextEntry2] : patient here for re-evaluation of her work injury of 1/28/22 to her left knee and foot Neck back and left wrist Pain is worse she is having more anxiety has have some issues with blood pressure and heart rate did go to the emergency room does see pain management

## 2023-03-27 ENCOUNTER — APPOINTMENT (OUTPATIENT)
Dept: PAIN MANAGEMENT | Facility: CLINIC | Age: 28
End: 2023-03-27
Payer: OTHER MISCELLANEOUS

## 2023-03-27 PROCEDURE — 99213 OFFICE O/P EST LOW 20 MIN: CPT | Mod: ACP

## 2023-03-27 NOTE — PHYSICAL EXAM
[de-identified] : Neck: Palpation of the cervical spine is as follows: bilateral trapezial tenderness and bilateral paracervical tenderness. Range of motion of the cervical spine is as follows: diminished range of motion in all planes Forward flexion is 10. Extension is 10. Left lateral rotation is 25. Right lateral rotation is 25. Strength Testing for the cervical spine is as follows: 4/5 strength in the UEs Neurological testing for the cervical spine is as follows: Deep tendon reflexes LEFT reveals Biceps Reflex Diminished, Triceps Reflex Diminished and brachioradialis Reflex Diminished. Deep tendon reflexes RIGHT reveals biceps reflex diminished, triceps reflex diminished and brachioradialis reflex diminished. Reflexes are diminished \par \par Back, including spine: Inspection of the thoracic/lumbar spine is as follows: Wearing a Cam boot on left leg.  Palpation of the thoracic/lumbar spine is as follows: bilateral lumbar paraspinal spasm. Range of motion of the thoracic and lumbar spine is as follows: Lumbar Forward Flexion 45 degrees. Lumbar Extension 10 degrees. Strength Testing of the thoracic and lumbar spine is as follows: 4/5 strength in the UEs Special testing of the thoracic and lumbar spine is as follows: Positive sitting straight leg raise on the left. Gait and function is as follows: patient ambulates without assistive device and antalgic gait.

## 2023-03-27 NOTE — HISTORY OF PRESENT ILLNESS
[FreeTextEntry1] : Ms. Stanley is a 27 year-old woman complaining of neck, back, left leg pain sustained in a work related injury on January 28, 2022. 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 Her colleague was on a pallet mary which ran over her left lower leg. She fell onto the left arm, left hand, and started to develop pain to her neck and lower back. Patient has finished her sessions of physical therapy for her neck and lower back which provided no sustained relief. She is going to begin PT for her left leg. Neck pain radiates to her left arm. She states she has limited ROM when turning her neck left and right. It tends to crack when moving it. Back pain radiates downwards to left leg. Bending forward hurts more than backwards. Neck pain = Back pain but she would like to proceed with treatment of the neck first. She has difficulty eating. She also has migraines and tends to wake up with severe headaches. She is wearing a Cam boot on the left leg and left wrist brace. EMG was performed 2 days ago and does not have results with her. She takes cyclobenzaprine and ibuprofen for pain. \par \par The pain started after an injury at work. Patient describes pain as severe rated at a 10/10. During the last month the pain has been constant with symptoms worsening in no typical pattern. Pain described as burning, sharp, pressure-like, cramping, dull aching, throbbing, shooting or cutting. Pain is associated with numbness/pins and needles. Pain is increased with lying down, standing, sitting, walking, exercise, relaxation, coughing sneezing or bowel movements. Bowel or bladder habits have changed.\par \par ACTIVITIES: Patient could walk 0 blocks before the pain starts. Patient can stand 15-20 minutes before pain starts. The patient often, constantly lies down because of pain. Patient uses no assisted walking device at this time. Patient has difficulty performing household chores, going to work, doing yardwork or shopping, socializing with friends, participating in recreational activities or exercising at this time.\par \par PRIOR PAIN TREATMENTS: Moderate relief with psychotherapy. \par \par Prior Pain Medications: Tylenol\par \par \par Today: I had the pleasure of seeing NED STANLEY in the office today. Previous history and physical exam findings have been reviewed. \par \par She is currently under our care for neck, back, and left leg pain which she is receiving active care for.  Her neck has been her main source of pain for the last few months and she states it has gradually worsened. She is unable to preform her ADL's and work around the house due to her pain. She says she has been reliant on her  to preform her daily tasks. She is on a current medication regimen of Cyclobenzaprine HCL 10mg thee times daily and Ibuprofen 600mg three times daily from Dr. Lewis. This regimen provides her with achieve 30% relief and helps her get adequate sleep. She is interested in trying medical marijuana and would like to speak to her attending physician about including it in her treatment plan. She would not like to undergo injection therapy at this time. \par

## 2023-03-27 NOTE — ASSESSMENT
[FreeTextEntry1] : Patient is a 28 year-old woman presenting today complaining of neck, back, left leg pain sustained in a work related injury on January 28, 2022. Pain is severe. Patient is presenting with radicular pain with impairment in ADLs and functionality. She would like to discuss medical marijuana treatments during her next visit for further relief. She wishes to hold off with injection therapy at this time. \par \par I personally reviewed with the PA, this patient's history and physical exam findings, as documented above. I have discussed the relevant areas of concern, having direct implications to the presenting problems and illnesses, and I have personally examined all pertinent and positive and negative findings, which impact on the prior pain \par management treatment.\par \par Brent Sierra PA-C\par Maribel Rios MD\par \par

## 2023-03-29 ENCOUNTER — LABORATORY RESULT (OUTPATIENT)
Age: 28
End: 2023-03-29

## 2023-03-29 ENCOUNTER — APPOINTMENT (OUTPATIENT)
Dept: PAIN MANAGEMENT | Facility: CLINIC | Age: 28
End: 2023-03-29
Payer: OTHER MISCELLANEOUS

## 2023-03-29 VITALS
HEIGHT: 69 IN | SYSTOLIC BLOOD PRESSURE: 103 MMHG | DIASTOLIC BLOOD PRESSURE: 81 MMHG | HEART RATE: 94 BPM | WEIGHT: 220 LBS | BODY MASS INDEX: 32.58 KG/M2

## 2023-03-29 DIAGNOSIS — Z00.00 ENCOUNTER FOR GENERAL ADULT MEDICAL EXAMINATION W/OUT ABNORMAL FINDINGS: ICD-10-CM

## 2023-03-29 LAB
AMP / AMPHETAMINE: NEGATIVE
BAR / SECOBARBITAL: NEGATIVE
BUP / BUPRENORPHINE: NEGATIVE
BZO / OXAZEPAM: NEGATIVE
COC / COCAINE: NEGATIVE
CREATININE: 50 MG/DL
MDMA / METHYLENEDIOXYMETHAMPHETAMINE: NEGATIVE
MET / METHAMPHETAMINES: NEGATIVE
MOP / MORPHINE: NEGATIVE
MTD / METHADONE: NEGATIVE
OXY / OXYCODONE: NEGATIVE
PCP / PHENCYCLIDINE: NEGATIVE
PH: 5
SPECIFIC GRAVITY: 1.02
TEMPERATURE: 90 C
THC / MARIJUANA: NEGATIVE

## 2023-03-29 PROCEDURE — 99214 OFFICE O/P EST MOD 30 MIN: CPT

## 2023-03-29 PROCEDURE — 80305 DRUG TEST PRSMV DIR OPT OBS: CPT | Mod: QW

## 2023-03-29 NOTE — WORK
[Other: ___] : [unfilled] [Was the competent medical cause of the injury] : was the competent medical cause of the injury [Are consistent with the injury] : are consistent with the injury [Consistent with my objective findings] : consistent with my objective findings [Total (100%)] : total (100%) [Does not reveal pre-existing condition(s) that may affect treatment/prognosis] : does not reveal pre-existing condition(s) that may affect treatment/prognosis [Cannot return to work because ________] : cannot return to work because [unfilled] [Patient] : patient [Rx may affect patient's ability to return to work, make patient drowsy, or other issue] : Rx may affect patient's ability to return to work, make patient drowsy, or other issue. [I provided the services listed above] :  I provided the services listed above. [FreeTextEntry1] : Guarded [Less than Sedentary Work:] :  Less than Sedentary Work: Unable to meet the requirement of Sedentary Work.

## 2023-03-29 NOTE — DATA REVIEWED
[FreeTextEntry1] : 03/21/2022 MRI CERVICAL SPINE : C4-5 broad-based central disc herniation impinges thecal sac and abuts the spinal cord. C5-6 broad-based central and left subarticular disc herniation impinges the thecal sac and effaces the left lateral recess.\par \par 03/23/2022 MRI LUMBAR SPINE : L2-3 disc bulge indents the thecal sac. The bulge narrows the neural foramina. L3-4 disc bulge indents the thecal sac and narrowing the foramina with bilateral facet effusions. L4-L5 there is no spinal or neural foraminal stenosis. Bilateral facet effusions. L5-S1 no spinal canal or neural foraminal stenosis bilateral facet effusions.\par \par \par \par SOAPP: low on 3/29/23\par Low risk: Patient has combination of a low risk SOAP and no risk factors. UDS would be repeated randomly every quarter.\par \par UDS: 3/29/2023  Neg all.\par \par [place] REGISTRY: Checked.\par

## 2023-03-29 NOTE — PHYSICAL EXAM
[de-identified] : Neck: Palpation of the cervical spine is as follows: bilateral trapezial tenderness and bilateral paracervical tenderness. Range of motion of the cervical spine is as follows: diminished range of motion in all planes Forward flexion is 10. Extension is 10. Left lateral rotation is 25. Right lateral rotation is 25. Strength Testing for the cervical spine is as follows: 4/5 strength in the UEs Neurological testing for the cervical spine is as follows: Deep tendon reflexes LEFT reveals Biceps Reflex Diminished, Triceps Reflex Diminished and brachioradialis Reflex Diminished. Deep tendon reflexes RIGHT reveals biceps reflex diminished, triceps reflex diminished and brachioradialis reflex diminished. Reflexes are diminished \par \par Back, including spine: Inspection of the thoracic/lumbar spine is as follows: Wearing a Cam boot on left leg.  Palpation of the thoracic/lumbar spine is as follows: bilateral lumbar paraspinal spasm. Range of motion of the thoracic and lumbar spine is as follows: Lumbar Forward Flexion 45 degrees. Lumbar Extension 10 degrees. Strength Testing of the thoracic and lumbar spine is as follows: 4/5 strength in the UEs Special testing of the thoracic and lumbar spine is as follows: Positive sitting straight leg raise on the left. Gait and function is as follows: patient ambulates without assistive device and antalgic gait.

## 2023-03-29 NOTE — ASSESSMENT
[FreeTextEntry1] : Patient is a 28 year-old woman presenting today complaining of neck, back, left leg pain sustained in a work related injury on January 28, 2022. Pain is severe and patient is presenting with radicular pain with impairment in ADLs and functionality. She would like to trial medical marijuana as a means to treat her pain We would like to trial medical marijuana. We will start with a sublingual oil/pill with high THC to low CBD ratio. We will give the patient the option to decrease to a 1:1 ratio if the patient reports drowsiness. A UDS was repeated at today's visit. She will follow up in 4 weeks for reassessment. All this patients questions were answered and the conversation was understood well.\par \par I explained the risk of addiction, tolerance and withdrawals. UDS will be done randomly and a drug agreement was signed.\par \par I advised the patient they must keep their medication under a lock and key, or in a safe place away from children or other individuals. This medication given is solely for the patient and under no circumstances to be shared. Patient verbalized this and is in agreement with the aforementioned. I explained the risk of addiction, tolerance, and withdrawals. UDS will be done randomly and a drug agreement was signed.\par \par \par I, Cecily Westfall, attest that this documentation has been prepared under the direction and in the presence of Provider Maribel Rios MD.\par \par \par Thank you for allowing me to assist in the management of this patient. \par \par \par Best Regards, \par \par \par Maribel Rios M.D., [facility]\par \par \par Diplomate, American Board of Physical Medicine and Rehabilitation\par Diplomate, American Board of Pain Medicine \par Diplomate, American Board of Pain Management\par \par

## 2023-04-07 LAB
PM 6 MAM: NEGATIVE NG/ML
PM 7-AMINO-CLONAZ: NEGATIVE NG/ML
PM ALPHA-HYDROXY-ALPRAZOLAM: NEGATIVE NG/ML
PM ALPHA-HYDROXY-MIDAZOLAM: NEGATIVE NG/ML
PM ALPRAZOLAM: NEGATIVE NG/ML
PM AMOBARBITAL: NEGATIVE NG/ML
PM AMPHETAMINE INTERP: NEGATIVE
PM AMPHETAMINE: NEGATIVE NG/ML
PM BARBURATES INTERP: NEGATIVE
PM BEG: NEGATIVE NG/ML
PM BENZODIAZEPINES INTERP: NEGATIVE
PM BUPRENORPHINE INTERP: NEGATIVE
PM BUPRENORPHINE: NEGATIVE NG/ML
PM BUTALBITAL: NEGATIVE NG/ML
PM CLONAZEPAM: NEGATIVE NG/ML
PM COCAINE INTERP: NEGATIVE
PM COCAINE: NEGATIVE NG/ML
PM CODIENE: NEGATIVE NG/ML
PM COTININE: NEGATIVE NG/ML
PM DIAZEPAM: NEGATIVE NG/ML
PM DIHYROCODEINE: NEGATIVE NG/ML
PM EDDP: NEGATIVE NG/ML
PM FENTANYL INTERP: NEGATIVE
PM FENTANYL: NEGATIVE NG/ML
PM FLUNITRAZEPAM: NEGATIVE NG/ML
PM FLURAZEPAM: NEGATIVE NG/ML
PM HYDROCODONE: NEGATIVE NG/ML
PM HYDROMORPHONE: NEGATIVE NG/ML
PM LORAZEPAM: NEGATIVE NG/ML
PM MARIJUANA (DELTA-9-THC): NEGATIVE NG/ML
PM MARIJUANA INTERP: NEGATIVE
PM MDA: NEGATIVE NG/ML
PM MDEA: NEGATIVE NG/ML
PM MDMA: NEGATIVE NG/ML
PM MEPERIDINE: NEGATIVE NG/ML
PM METHADONE INTERP: NEGATIVE
PM METHADONE: NEGATIVE NG/ML
PM METHAMPHETAMINE: NEGATIVE NG/ML
PM MIDAZOLAM: NEGATIVE NG/ML
PM MORPHINE: NEGATIVE NG/ML
PM NALOXONE: NEGATIVE NG/ML
PM NALTREXONE: NEGATIVE NG/ML
PM NICOTINE INTERP: NEGATIVE
PM NORBUPRENORPHINE: NEGATIVE NG/ML
PM NORDIAZEPAM: NEGATIVE NG/ML
PM NORMEPERIDINE: NEGATIVE NG/ML
PM NOROXYCODONE: NEGATIVE NG/ML
PM OPIOID INTERP: NEGATIVE
PM OXAZEPAM: NEGATIVE NG/ML
PM OXXYCODONE INTERP: NEGATIVE
PM OXYCODONE: NEGATIVE NG/ML
PM OXYMORPHONE: NEGATIVE NG/ML
PM PCP: NEGATIVE NG/ML
PM PHENCYCLIDINE INTERP: NEGATIVE
PM PHENOBARBITAL: NEGATIVE NG/ML
PM PPX: NEGATIVE NG/ML
PM PROPOXYPHENE INTERP: NEGATIVE
PM SECOBARBITAL: NEGATIVE NG/ML
PM SUFENTANIL: NEGATIVE NG/ML
PM TAPENTADOL: NEGATIVE NG/ML
PM TEMAZEPAM: NEGATIVE NG/ML
PM TRAMADOL INTERP: NEGATIVE
PM TRAMADOL: NEGATIVE NG/ML

## 2023-04-26 ENCOUNTER — APPOINTMENT (OUTPATIENT)
Dept: PAIN MANAGEMENT | Facility: CLINIC | Age: 28
End: 2023-04-26
Payer: OTHER MISCELLANEOUS

## 2023-04-26 VITALS
DIASTOLIC BLOOD PRESSURE: 81 MMHG | BODY MASS INDEX: 32.58 KG/M2 | SYSTOLIC BLOOD PRESSURE: 117 MMHG | HEIGHT: 69 IN | WEIGHT: 220 LBS | HEART RATE: 94 BPM

## 2023-04-26 PROCEDURE — 99214 OFFICE O/P EST MOD 30 MIN: CPT

## 2023-04-26 NOTE — ASSESSMENT
[FreeTextEntry1] : Patient is a 28 year-old woman presenting today complaining of neck, back, left leg pain sustained in a work related injury on January 28, 2022. Pain is severe and patient is presenting with radicular pain with impairment in ADLs and functionality. She was not able to start with the medical marijuana this month but plans to buy it next month. In the interim, I will provide her a different muscle relaxant for symptom management. She will follow up in 4 weeks for reassessment. All this patients questions were answered and the conversation was understood well.\par \par I explained the risk of addiction, tolerance and withdrawals. UDS will be done randomly and a drug agreement was signed.\par \par I advised the patient they must keep their medication under a lock and key, or in a safe place away from children or other individuals. This medication given is solely for the patient and under no circumstances to be shared. Patient verbalized this and is in agreement with the aforementioned. I explained the risk of addiction, tolerance, and withdrawals. UDS will be done randomly and a drug agreement was signed.\par \par \par ELVI, Cecily Westfall, attest that this documentation has been prepared under the direction and in the presence of Provider Maribel Rios MD.\par \par \par Thank you for allowing me to assist in the management of this patient. \par \par \par Best Regards, \par \par \par Maribel Rios M.D., FAAPMR\par \par \par Diplomate, American Board of Physical Medicine and Rehabilitation\par Diplomate, American Board of Pain Medicine \par Diplomate, American Board of Pain Management\par \par

## 2023-04-26 NOTE — WORK
[Other: ___] : [unfilled] [Was the competent medical cause of the injury] : was the competent medical cause of the injury [Are consistent with the injury] : are consistent with the injury [Consistent with my objective findings] : consistent with my objective findings [Total (100%)] : total (100%) [Does not reveal pre-existing condition(s) that may affect treatment/prognosis] : does not reveal pre-existing condition(s) that may affect treatment/prognosis [Cannot return to work because ________] : cannot return to work because [unfilled] [Patient] : patient [Rx may affect patient's ability to return to work, make patient drowsy, or other issue] : Rx may affect patient's ability to return to work, make patient drowsy, or other issue. [I provided the services listed above] :  I provided the services listed above. [Less than Sedentary Work:] :  Less than Sedentary Work: Unable to meet the requirement of Sedentary Work. [FreeTextEntry1] : Guarded

## 2023-04-26 NOTE — PHYSICAL EXAM
[de-identified] : Neck: Palpation of the cervical spine is as follows: bilateral trapezial tenderness and bilateral paracervical tenderness. Range of motion of the cervical spine is as follows: diminished range of motion in all planes Forward flexion is 10. Extension is 10. Left lateral rotation is 25. Right lateral rotation is 25. Strength Testing for the cervical spine is as follows: 4/5 strength in the UEs Neurological testing for the cervical spine is as follows: Deep tendon reflexes LEFT reveals Biceps Reflex Diminished, Triceps Reflex Diminished and brachioradialis Reflex Diminished. Deep tendon reflexes RIGHT reveals biceps reflex diminished, triceps reflex diminished and brachioradialis reflex diminished. Reflexes are diminished \par \par Back, including spine: Inspection of the thoracic/lumbar spine is as follows: Wearing a Cam boot on left leg.  Palpation of the thoracic/lumbar spine is as follows: bilateral lumbar paraspinal spasm. Range of motion of the thoracic and lumbar spine is as follows: Lumbar Forward Flexion 45 degrees. Lumbar Extension 10 degrees. Strength Testing of the thoracic and lumbar spine is as follows: 4/5 strength in the UEs Special testing of the thoracic and lumbar spine is as follows: Positive sitting straight leg raise on the left. Gait and function is as follows: patient ambulates without assistive device and antalgic gait.

## 2023-04-26 NOTE — DATA REVIEWED
[FreeTextEntry1] : 03/21/2022 MRI CERVICAL SPINE : C4-5 broad-based central disc herniation impinges thecal sac and abuts the spinal cord. C5-6 broad-based central and left subarticular disc herniation impinges the thecal sac and effaces the left lateral recess.\par \par 03/23/2022 MRI LUMBAR SPINE : L2-3 disc bulge indents the thecal sac. The bulge narrows the neural foramina. L3-4 disc bulge indents the thecal sac and narrowing the foramina with bilateral facet effusions. L4-L5 there is no spinal or neural foraminal stenosis. Bilateral facet effusions. L5-S1 no spinal canal or neural foraminal stenosis bilateral facet effusions.\par \par SOAPP: low on 3/29/23\par Low risk: Patient has combination of a low risk SOAP and no risk factors. UDS would be repeated randomly every quarter.\par \par UDS: 3/29/2023  Neg all.\par \par [place] REGISTRY: Checked.\par
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
## 2023-05-01 ENCOUNTER — FORM ENCOUNTER (OUTPATIENT)
Age: 28
End: 2023-05-01

## 2023-05-16 ENCOUNTER — EMERGENCY (EMERGENCY)
Facility: HOSPITAL | Age: 28
LOS: 0 days | Discharge: HOME | End: 2023-05-16
Attending: STUDENT IN AN ORGANIZED HEALTH CARE EDUCATION/TRAINING PROGRAM
Payer: COMMERCIAL

## 2023-05-16 VITALS
DIASTOLIC BLOOD PRESSURE: 79 MMHG | OXYGEN SATURATION: 97 % | HEART RATE: 89 BPM | SYSTOLIC BLOOD PRESSURE: 127 MMHG | RESPIRATION RATE: 16 BRPM

## 2023-05-16 VITALS
TEMPERATURE: 96 F | SYSTOLIC BLOOD PRESSURE: 145 MMHG | OXYGEN SATURATION: 97 % | RESPIRATION RATE: 22 BRPM | WEIGHT: 220.46 LBS | HEART RATE: 121 BPM | HEIGHT: 69 IN | DIASTOLIC BLOOD PRESSURE: 86 MMHG

## 2023-05-16 DIAGNOSIS — R51.9 HEADACHE, UNSPECIFIED: ICD-10-CM

## 2023-05-16 DIAGNOSIS — R00.2 PALPITATIONS: ICD-10-CM

## 2023-05-16 DIAGNOSIS — I10 ESSENTIAL (PRIMARY) HYPERTENSION: ICD-10-CM

## 2023-05-16 DIAGNOSIS — Z88.0 ALLERGY STATUS TO PENICILLIN: ICD-10-CM

## 2023-05-16 DIAGNOSIS — R07.0 PAIN IN THROAT: ICD-10-CM

## 2023-05-16 DIAGNOSIS — R00.0 TACHYCARDIA, UNSPECIFIED: ICD-10-CM

## 2023-05-16 DIAGNOSIS — R50.9 FEVER, UNSPECIFIED: ICD-10-CM

## 2023-05-16 DIAGNOSIS — R10.814 LEFT LOWER QUADRANT ABDOMINAL TENDERNESS: ICD-10-CM

## 2023-05-16 DIAGNOSIS — Z88.1 ALLERGY STATUS TO OTHER ANTIBIOTIC AGENTS STATUS: ICD-10-CM

## 2023-05-16 DIAGNOSIS — R20.0 ANESTHESIA OF SKIN: ICD-10-CM

## 2023-05-16 DIAGNOSIS — Z98.890 OTHER SPECIFIED POSTPROCEDURAL STATES: Chronic | ICD-10-CM

## 2023-05-16 DIAGNOSIS — R10.12 LEFT UPPER QUADRANT PAIN: ICD-10-CM

## 2023-05-16 DIAGNOSIS — Z91.013 ALLERGY TO SEAFOOD: ICD-10-CM

## 2023-05-16 LAB
ALBUMIN SERPL ELPH-MCNC: 4.8 G/DL — SIGNIFICANT CHANGE UP (ref 3.5–5.2)
ALP SERPL-CCNC: 102 U/L — SIGNIFICANT CHANGE UP (ref 30–115)
ALT FLD-CCNC: 21 U/L — SIGNIFICANT CHANGE UP (ref 0–41)
ANION GAP SERPL CALC-SCNC: 13 MMOL/L — SIGNIFICANT CHANGE UP (ref 7–14)
APPEARANCE UR: CLEAR — SIGNIFICANT CHANGE UP
AST SERPL-CCNC: 23 U/L — SIGNIFICANT CHANGE UP (ref 0–41)
BASE EXCESS BLDV CALC-SCNC: 0.7 MMOL/L — SIGNIFICANT CHANGE UP (ref -2–3)
BASOPHILS # BLD AUTO: 0.03 K/UL — SIGNIFICANT CHANGE UP (ref 0–0.2)
BASOPHILS NFR BLD AUTO: 0.4 % — SIGNIFICANT CHANGE UP (ref 0–1)
BILIRUB SERPL-MCNC: 0.6 MG/DL — SIGNIFICANT CHANGE UP (ref 0.2–1.2)
BILIRUB UR-MCNC: NEGATIVE — SIGNIFICANT CHANGE UP
BUN SERPL-MCNC: 9 MG/DL — LOW (ref 10–20)
CA-I SERPL-SCNC: 1.2 MMOL/L — SIGNIFICANT CHANGE UP (ref 1.15–1.33)
CALCIUM SERPL-MCNC: 9.9 MG/DL — SIGNIFICANT CHANGE UP (ref 8.4–10.4)
CHLORIDE SERPL-SCNC: 101 MMOL/L — SIGNIFICANT CHANGE UP (ref 98–110)
CO2 SERPL-SCNC: 21 MMOL/L — SIGNIFICANT CHANGE UP (ref 17–32)
COLOR SPEC: SIGNIFICANT CHANGE UP
CREAT SERPL-MCNC: 0.9 MG/DL — SIGNIFICANT CHANGE UP (ref 0.7–1.5)
DIFF PNL FLD: NEGATIVE — SIGNIFICANT CHANGE UP
EGFR: 89 ML/MIN/1.73M2 — SIGNIFICANT CHANGE UP
EOSINOPHIL # BLD AUTO: 0 K/UL — SIGNIFICANT CHANGE UP (ref 0–0.7)
EOSINOPHIL NFR BLD AUTO: 0 % — SIGNIFICANT CHANGE UP (ref 0–8)
GAS PNL BLDV: 131 MMOL/L — LOW (ref 136–145)
GAS PNL BLDV: SIGNIFICANT CHANGE UP
GLUCOSE SERPL-MCNC: 126 MG/DL — HIGH (ref 70–99)
GLUCOSE UR QL: NEGATIVE — SIGNIFICANT CHANGE UP
HCG SERPL QL: NEGATIVE — SIGNIFICANT CHANGE UP
HCO3 BLDV-SCNC: 25 MMOL/L — SIGNIFICANT CHANGE UP (ref 22–29)
HCT VFR BLD CALC: 43.9 % — SIGNIFICANT CHANGE UP (ref 37–47)
HCT VFR BLDA CALC: 47 % — SIGNIFICANT CHANGE UP (ref 39–51)
HGB BLD CALC-MCNC: 15.7 G/DL — SIGNIFICANT CHANGE UP (ref 12.6–17.4)
HGB BLD-MCNC: 15.2 G/DL — SIGNIFICANT CHANGE UP (ref 12–16)
IMM GRANULOCYTES NFR BLD AUTO: 0.3 % — SIGNIFICANT CHANGE UP (ref 0.1–0.3)
KETONES UR-MCNC: NEGATIVE — SIGNIFICANT CHANGE UP
LACTATE BLDV-MCNC: 1.5 MMOL/L — SIGNIFICANT CHANGE UP (ref 0.5–2)
LEUKOCYTE ESTERASE UR-ACNC: NEGATIVE — SIGNIFICANT CHANGE UP
LIDOCAIN IGE QN: 28 U/L — SIGNIFICANT CHANGE UP (ref 7–60)
LYMPHOCYTES # BLD AUTO: 1 K/UL — LOW (ref 1.2–3.4)
LYMPHOCYTES # BLD AUTO: 14.5 % — LOW (ref 20.5–51.1)
MAGNESIUM SERPL-MCNC: 1.9 MG/DL — SIGNIFICANT CHANGE UP (ref 1.8–2.4)
MCHC RBC-ENTMCNC: 30.6 PG — SIGNIFICANT CHANGE UP (ref 27–31)
MCHC RBC-ENTMCNC: 34.6 G/DL — SIGNIFICANT CHANGE UP (ref 32–37)
MCV RBC AUTO: 88.5 FL — SIGNIFICANT CHANGE UP (ref 81–99)
MONOCYTES # BLD AUTO: 0.25 K/UL — SIGNIFICANT CHANGE UP (ref 0.1–0.6)
MONOCYTES NFR BLD AUTO: 3.6 % — SIGNIFICANT CHANGE UP (ref 1.7–9.3)
NEUTROPHILS # BLD AUTO: 5.61 K/UL — SIGNIFICANT CHANGE UP (ref 1.4–6.5)
NEUTROPHILS NFR BLD AUTO: 81.2 % — HIGH (ref 42.2–75.2)
NITRITE UR-MCNC: NEGATIVE — SIGNIFICANT CHANGE UP
NRBC # BLD: 0 /100 WBCS — SIGNIFICANT CHANGE UP (ref 0–0)
NT-PROBNP SERPL-SCNC: 52 PG/ML — SIGNIFICANT CHANGE UP (ref 0–300)
PCO2 BLDV: 40 MMHG — SIGNIFICANT CHANGE UP (ref 39–42)
PH BLDV: 7.41 — SIGNIFICANT CHANGE UP (ref 7.32–7.43)
PH UR: 7 — SIGNIFICANT CHANGE UP (ref 5–8)
PLATELET # BLD AUTO: 233 K/UL — SIGNIFICANT CHANGE UP (ref 130–400)
PMV BLD: 9.4 FL — SIGNIFICANT CHANGE UP (ref 7.4–10.4)
PO2 BLDV: 35 MMHG — SIGNIFICANT CHANGE UP
POTASSIUM BLDV-SCNC: 5.4 MMOL/L — HIGH (ref 3.5–5.1)
POTASSIUM SERPL-MCNC: 4.5 MMOL/L — SIGNIFICANT CHANGE UP (ref 3.5–5)
POTASSIUM SERPL-SCNC: 4.5 MMOL/L — SIGNIFICANT CHANGE UP (ref 3.5–5)
PROT SERPL-MCNC: 8.4 G/DL — HIGH (ref 6–8)
PROT UR-MCNC: NEGATIVE — SIGNIFICANT CHANGE UP
RBC # BLD: 4.96 M/UL — SIGNIFICANT CHANGE UP (ref 4.2–5.4)
RBC # FLD: 11.7 % — SIGNIFICANT CHANGE UP (ref 11.5–14.5)
SAO2 % BLDV: 62.9 % — SIGNIFICANT CHANGE UP
SODIUM SERPL-SCNC: 135 MMOL/L — SIGNIFICANT CHANGE UP (ref 135–146)
SP GR SPEC: 1.01 — SIGNIFICANT CHANGE UP (ref 1.01–1.03)
TROPONIN T SERPL-MCNC: <0.01 NG/ML — SIGNIFICANT CHANGE UP
UROBILINOGEN FLD QL: SIGNIFICANT CHANGE UP
WBC # BLD: 6.91 K/UL — SIGNIFICANT CHANGE UP (ref 4.8–10.8)
WBC # FLD AUTO: 6.91 K/UL — SIGNIFICANT CHANGE UP (ref 4.8–10.8)

## 2023-05-16 PROCEDURE — 85025 COMPLETE CBC W/AUTO DIFF WBC: CPT

## 2023-05-16 PROCEDURE — 82330 ASSAY OF CALCIUM: CPT

## 2023-05-16 PROCEDURE — 81003 URINALYSIS AUTO W/O SCOPE: CPT

## 2023-05-16 PROCEDURE — 84132 ASSAY OF SERUM POTASSIUM: CPT

## 2023-05-16 PROCEDURE — 83735 ASSAY OF MAGNESIUM: CPT

## 2023-05-16 PROCEDURE — 83605 ASSAY OF LACTIC ACID: CPT

## 2023-05-16 PROCEDURE — 83880 ASSAY OF NATRIURETIC PEPTIDE: CPT

## 2023-05-16 PROCEDURE — 71275 CT ANGIOGRAPHY CHEST: CPT | Mod: MA

## 2023-05-16 PROCEDURE — 87086 URINE CULTURE/COLONY COUNT: CPT

## 2023-05-16 PROCEDURE — 85014 HEMATOCRIT: CPT

## 2023-05-16 PROCEDURE — 84484 ASSAY OF TROPONIN QUANT: CPT

## 2023-05-16 PROCEDURE — 83690 ASSAY OF LIPASE: CPT

## 2023-05-16 PROCEDURE — 80053 COMPREHEN METABOLIC PANEL: CPT

## 2023-05-16 PROCEDURE — 36415 COLL VENOUS BLD VENIPUNCTURE: CPT

## 2023-05-16 PROCEDURE — 85018 HEMOGLOBIN: CPT

## 2023-05-16 PROCEDURE — 71045 X-RAY EXAM CHEST 1 VIEW: CPT

## 2023-05-16 PROCEDURE — 93010 ELECTROCARDIOGRAM REPORT: CPT

## 2023-05-16 PROCEDURE — 71045 X-RAY EXAM CHEST 1 VIEW: CPT | Mod: 26

## 2023-05-16 PROCEDURE — 71275 CT ANGIOGRAPHY CHEST: CPT | Mod: 26,MA

## 2023-05-16 PROCEDURE — 74177 CT ABD & PELVIS W/CONTRAST: CPT | Mod: 26,MA

## 2023-05-16 PROCEDURE — 74177 CT ABD & PELVIS W/CONTRAST: CPT | Mod: MA

## 2023-05-16 PROCEDURE — 96374 THER/PROPH/DIAG INJ IV PUSH: CPT | Mod: XU

## 2023-05-16 PROCEDURE — 84703 CHORIONIC GONADOTROPIN ASSAY: CPT

## 2023-05-16 PROCEDURE — 84295 ASSAY OF SERUM SODIUM: CPT

## 2023-05-16 PROCEDURE — 99285 EMERGENCY DEPT VISIT HI MDM: CPT | Mod: 25

## 2023-05-16 PROCEDURE — 93005 ELECTROCARDIOGRAM TRACING: CPT

## 2023-05-16 PROCEDURE — 82803 BLOOD GASES ANY COMBINATION: CPT

## 2023-05-16 PROCEDURE — 99285 EMERGENCY DEPT VISIT HI MDM: CPT

## 2023-05-16 RX ORDER — KETOROLAC TROMETHAMINE 30 MG/ML
15 SYRINGE (ML) INJECTION ONCE
Refills: 0 | Status: DISCONTINUED | OUTPATIENT
Start: 2023-05-16 | End: 2023-05-16

## 2023-05-16 RX ORDER — SODIUM CHLORIDE 9 MG/ML
2000 INJECTION, SOLUTION INTRAVENOUS ONCE
Refills: 0 | Status: COMPLETED | OUTPATIENT
Start: 2023-05-16 | End: 2023-05-16

## 2023-05-16 RX ADMIN — SODIUM CHLORIDE 2000 MILLILITER(S): 9 INJECTION, SOLUTION INTRAVENOUS at 19:40

## 2023-05-16 RX ADMIN — Medication 15 MILLIGRAM(S): at 20:44

## 2023-05-16 NOTE — ED PROVIDER NOTE - CARE PROVIDER_API CALL
Terrance Melton (MD)  Medicine  [address] [place] [address]  Phone: [phone]  Fax: [phone]  Follow Up Time: 1-3 Days

## 2023-05-16 NOTE — ED PROVIDER NOTE - DIFFERENTIAL DIAGNOSIS
Differential Diagnosis palpitations & sob r/o acs r/o ptx r/o pneumonia r/o pe will do labs cta chest ekg

## 2023-05-16 NOTE — ED PROVIDER NOTE - CLINICAL SUMMARY MEDICAL DECISION MAKING FREE TEXT BOX
28 year old female with a pmh of tachycardia/htn currently on metoprolol 25xl (previously was on atenolol follows with dr. hernanedz) presents here for worsening palpitations. Patient also states palpitations cause her chest to feel numb with radiation to her left neck . Yesterday patient also began having congestion throat pain was seen by another PMD Dr. Bravo prescribed clindamycin and prednisone. Also has been having lower abdominal pain and luq abdominal pain and fevers since yesterday  tmax of 39 took excedrin at 6am today due to a headache. Denies tobacco use, recent travel recent surgeries, ocp use lower leg pain/swelling. vs reviewed labs imaging ekg obtained and reviewed troponin x1 negative, ua cta pe , abd/pelvis :< from: CT Angio Chest PE Protocol w/ IV Cont (05.16.23 @ 22:39) >No CTA evidence of acute pulmonary embolus.No CT evidence of acute intra-abdominal pathology. fluids given with improvement. Patient a spoken to in detail about results  All questions addressed.  Results of ED work up discussed and patient given a copy of the results. Patient has proper follow up. Return precautions given.

## 2023-05-16 NOTE — ED PROVIDER NOTE - NSFOLLOWUPINSTRUCTIONS_ED_ALL_ED_FT
Please follow up with your PMD Dr. hernandez 1-3 days     Palpitations    A palpitation is the feeling that your heartbeat is irregular or is faster than normal. It may feel like your heart is fluttering or skipping a beat. They may be caused by many things, including smoking, caffeine, alcohol, stress, and certain medicines. Although most causes of palpitations are not serious, palpitations can be a sign of a serious medical problem. Avoid caffeine, alcohol, and tobacco products at home. Try to reduce stress and anxiety and make sure to get plenty of rest.     SEEK IMMEDIATE MEDICAL CARE IF YOU HAVE ANY OF THE FOLLOWING SYMPTOMS: chest pain, shortness of breath, severe headache, dizziness/lightheadedness, or fainting.

## 2023-05-16 NOTE — ED PROVIDER NOTE - PHYSICAL EXAMINATION
CONSTITUTIONAL: WA / WN / NAD  HEAD: NCAT  EYES: PERRL; EOMI; anicteric.  ENT: mild erythema posterior phraynx   NECK: Supple; no meningeal signs  CARD: tachycardic nl S1/S2; no M/R/G.  RESP: Respiratory rate and effort are normal; breath sounds clear and equal bilaterally.  ABD: Soft, ND + luq and llq ttp   MSK/EXT: No gross deformities no calf ttp b/l   SKIN: Warm and dry;   NEURO: AAOx3, Motor 5/5 x 4 extremities,  PSYCH: Memory Intact, Normal Affect

## 2023-05-16 NOTE — ED PROVIDER NOTE - OBJECTIVE STATEMENT
28 year old female with a pmh of tachycardia/htn currently on metoprolol 25xl (previously was on atenolol follows with dr. hernandez) presents here for worsening palpitations. Patient also states palpitations cause her chest to feel numb with radiation to her left neck . Yesterday patient also began having congestion throat pain was seen by another PMD Dr. Bravo prescribed clindamycin and prednisone. Also has been having lower abdominal pain and luq abdominal pain and fevers since yesterday  tmax of 39 took excedrin at 6am today due to a headache. Denies tobacco use, recent travel recent surgeries, ocp use lower leg pain/swelling.

## 2023-05-16 NOTE — ED PROVIDER NOTE - PATIENT PORTAL LINK FT
You can access the FollowMyHealth Patient Portal offered by [facility] by registering at the following website: [email or web address]. By joining [facility]’s FollowMyHealth portal, you will also be able to view your health information using other applications (apps) compatible with our system.

## 2023-05-17 LAB
CULTURE RESULTS: SIGNIFICANT CHANGE UP
SPECIMEN SOURCE: SIGNIFICANT CHANGE UP

## 2023-05-21 NOTE — ASU PATIENT PROFILE, ADULT - TEACHING/LEARNING FACTORS IMPACT ABILITY TO LEARN
Age: 78y    Gender: Female    POCT Blood Glucose:  135 mg/dL (05-20-23 @ 09:23)  120 mg/dL (05-20-23 @ 09:01)  71 mg/dL (05-20-23 @ 08:33)  59 mg/dL (05-20-23 @ 08:16)  55 mg/dL (05-20-23 @ 08:15)  129 mg/dL (05-19-23 @ 22:43)  83 mg/dL (05-19-23 @ 21:18)  89 mg/dL (05-19-23 @ 18:02)      eMAR:atorvastatin   20 milliGRAM(s) Oral (05-19-23 @ 21:36)    dextrose 50% Injectable   25 Gram(s) IV Push (05-19-23 @ 12:30)    insulin glargine Injectable (LANTUS)   8 Unit(s) SubCutaneous (05-19-23 @ 23:58)    insulin regular  human recombinant.   5 Unit(s) IV Push (05-19-23 @ 13:22)    levothyroxine   50 MICROGram(s) Oral (05-20-23 @ 05:45)    
Age: 78y    Gender: Female    POCT Blood Glucose:  161 mg/dL (05-21-23 @ 09:26)  160 mg/dL (05-21-23 @ 09:05)  55 mg/dL (05-21-23 @ 08:21)  60 mg/dL (05-21-23 @ 08:20)  107 mg/dL (05-21-23 @ 02:36)  132 mg/dL (05-20-23 @ 21:26)  118 mg/dL (05-20-23 @ 17:32)  176 mg/dL (05-20-23 @ 12:09)      eMAR:atorvastatin   20 milliGRAM(s) Oral (05-20-23 @ 21:10)    dextrose 50% Injectable   12.5 Gram(s) IV Push (05-21-23 @ 08:41)    insulin glargine Injectable (LANTUS)   10 Unit(s) SubCutaneous (05-20-23 @ 21:39)    insulin lispro (ADMELOG) corrective regimen sliding scale   1 Unit(s) SubCutaneous (05-20-23 @ 12:20)    levothyroxine   50 MICROGram(s) Oral (05-21-23 @ 05:44)    
none

## 2023-05-31 ENCOUNTER — APPOINTMENT (OUTPATIENT)
Dept: PAIN MANAGEMENT | Facility: CLINIC | Age: 28
End: 2023-05-31
Payer: OTHER MISCELLANEOUS

## 2023-05-31 VITALS — BODY MASS INDEX: 32.58 KG/M2 | WEIGHT: 220 LBS | HEIGHT: 69 IN

## 2023-05-31 PROCEDURE — 99214 OFFICE O/P EST MOD 30 MIN: CPT

## 2023-05-31 NOTE — ASSESSMENT
[FreeTextEntry1] : Patient is a 28 year-old woman presenting today complaining of neck, back, left leg pain sustained in a work related injury on January 28, 2022. Pain is severe and patient is presenting with radicular pain with impairment in ADLs and functionality. She was not able to start with the medical marijuana this month due to being hospitalized but plans to buy it next month. She will follow up in 4 weeks for reassessment. All this patients questions were answered and the conversation was understood well.\par \par I explained the risk of addiction, tolerance and withdrawals. UDS will be done randomly and a drug agreement was signed.\par \par I advised the patient they must keep their medication under a lock and key, or in a safe place away from children or other individuals. This medication given is solely for the patient and under no circumstances to be shared. Patient verbalized this and is in agreement with the aforementioned. I explained the risk of addiction, tolerance, and withdrawals. UDS will be done randomly and a drug agreement was signed.\par \par \par I, Cecily Westfall, attest that this documentation has been prepared under the direction and in the presence of Provider Maribel Rios MD.\par \par \par Thank you for allowing me to assist in the management of this patient. \par \par \par Best Regards, \par \par \par Maribel Rios M.D., [facility]\par \par \par Diplomate, American Board of Physical Medicine and Rehabilitation\par Diplomate, American Board of Pain Medicine \par Diplomate, American Board of Pain Management\par \par

## 2023-05-31 NOTE — PHYSICAL EXAM
[de-identified] : Neck: Palpation of the cervical spine is as follows: bilateral trapezial tenderness and bilateral paracervical tenderness. Range of motion of the cervical spine is as follows: diminished range of motion in all planes Forward flexion is 10. Extension is 10. Left lateral rotation is 25. Right lateral rotation is 25. Strength Testing for the cervical spine is as follows: 4/5 strength in the UEs Neurological testing for the cervical spine is as follows: Deep tendon reflexes LEFT reveals Biceps Reflex Diminished, Triceps Reflex Diminished and brachioradialis Reflex Diminished. Deep tendon reflexes RIGHT reveals biceps reflex diminished, triceps reflex diminished and brachioradialis reflex diminished. Reflexes are diminished \par \par Back, including spine: Inspection of the thoracic/lumbar spine is as follows: Wearing a Cam boot on left leg.  Palpation of the thoracic/lumbar spine is as follows: bilateral lumbar paraspinal spasm. Range of motion of the thoracic and lumbar spine is as follows: Lumbar Forward Flexion 45 degrees. Lumbar Extension 10 degrees. Strength Testing of the thoracic and lumbar spine is as follows: 4/5 strength in the UEs Special testing of the thoracic and lumbar spine is as follows: Positive sitting straight leg raise on the left. Gait and function is as follows: patient ambulates without assistive device and antalgic gait.
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
## 2023-06-12 ENCOUNTER — APPOINTMENT (OUTPATIENT)
Dept: ORTHOPEDIC SURGERY | Facility: CLINIC | Age: 28
End: 2023-06-12
Payer: OTHER MISCELLANEOUS

## 2023-06-12 PROCEDURE — 99213 OFFICE O/P EST LOW 20 MIN: CPT

## 2023-06-12 NOTE — REASON FOR VISIT
[Spouse] : spouse [FreeTextEntry2] : patient here for re-evaluation of her work injury of 1/28/22 to her left knee and foot Neck back and left wrist Pain seeing Dr Rios for PM   also virtual counselling x4  says her pains are worse has not begun medical marijuana yet

## 2023-06-12 NOTE — IMAGING
[de-identified] : Cervical spine some spasm diffuse tenderness\par \par Lumbar spine spasm limited motion in all planes tested positive straight leg bilaterally\par \par  left wrist diffuse tenderness\par  right hand diffusely tender decreased \par \par  left knee good motion tenderness mediolateral joint line minimal swelling\par \par X-rays left knee unremarkable\par \par  left ankle  tenderness on both medial lateral side mild swelling negative anterior drawer Achilles intact

## 2023-06-12 NOTE — ASSESSMENT
[FreeTextEntry1] : \par   recommended therapy of  the cervical lumbar spine,\par left ankle and left shoulder ibuprofen and flexeril were refilled  I asked that a copy  of the nerve tests be provided she does\par appear quite stressed,  clinically I sense that she may have PTSD and would benefit from some psychological\par assistance she has gone x4   i would like to speak to themI will see her in  3 months at this\par time she is totally disabled unable to work follow-up with pain management    again recommended MRI of the left knee  she is  using a cane
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
## 2023-07-31 ENCOUNTER — APPOINTMENT (OUTPATIENT)
Dept: PAIN MANAGEMENT | Facility: CLINIC | Age: 28
End: 2023-07-31

## 2023-08-21 ENCOUNTER — APPOINTMENT (OUTPATIENT)
Dept: PAIN MANAGEMENT | Facility: CLINIC | Age: 28
End: 2023-08-21
Payer: OTHER MISCELLANEOUS

## 2023-08-21 PROCEDURE — 99213 OFFICE O/P EST LOW 20 MIN: CPT | Mod: ACP
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
## 2023-08-21 NOTE — PHYSICAL EXAM
[de-identified] : Neck: Palpation of the cervical spine is as follows: bilateral trapezial tenderness and bilateral paracervical tenderness. Range of motion of the cervical spine is as follows: diminished range of motion in all planes Forward flexion is 10. Extension is 10. Left lateral rotation is 25. Right lateral rotation is 25. Strength Testing for the cervical spine is as follows: 4/5 strength in the UEs Neurological testing for the cervical spine is as follows: Deep tendon reflexes LEFT reveals Biceps Reflex Diminished, Triceps Reflex Diminished and brachioradialis Reflex Diminished. Deep tendon reflexes RIGHT reveals biceps reflex diminished, triceps reflex diminished and brachioradialis reflex diminished. Reflexes are diminished \par  \par  Back, including spine: Inspection of the thoracic/lumbar spine is as follows: Wearing a Cam boot on left leg.  Palpation of the thoracic/lumbar spine is as follows: bilateral lumbar paraspinal spasm. Range of motion of the thoracic and lumbar spine is as follows: Lumbar Forward Flexion 45 degrees. Lumbar Extension 10 degrees. Strength Testing of the thoracic and lumbar spine is as follows: 4/5 strength in the UEs Special testing of the thoracic and lumbar spine is as follows: Positive sitting straight leg raise on the left. Gait and function is as follows: patient ambulates without assistive device and antalgic gait.

## 2023-08-21 NOTE — ASSESSMENT
[FreeTextEntry1] : Patient is a 28-year-old woman presenting today complaining of neck, back, left leg pain sustained in a work-related injury on January 28, 2022. Her cervical pain is severe and shooting into her upper extremities bilaterally. We will submit for a cervical MRI to help determine a pain generator. We would then decide if injection therapy is a viable treatment option. She was prescribed medical marijuana last month, but her cardiologist advised her to not use this medication with metoprolol which she was prescribed for PTSD. We will start her on Gabapentin 600 mg BID for radicular pain relief. She will follow up in 4 weeks for further evaluation and imaging review.   JOSE Hernández MD

## 2023-09-12 ENCOUNTER — NON-APPOINTMENT (OUTPATIENT)
Age: 28
End: 2023-09-12

## 2023-09-20 ENCOUNTER — NON-APPOINTMENT (OUTPATIENT)
Age: 28
End: 2023-09-20

## 2023-09-20 DIAGNOSIS — R20.0 ANESTHESIA OF SKIN: ICD-10-CM

## 2023-09-20 DIAGNOSIS — M79.642 PAIN IN RIGHT HAND: ICD-10-CM

## 2023-09-20 DIAGNOSIS — M25.522 PAIN IN RIGHT ELBOW: ICD-10-CM

## 2023-09-20 DIAGNOSIS — M79.641 PAIN IN RIGHT HAND: ICD-10-CM

## 2023-09-20 DIAGNOSIS — M25.521 PAIN IN RIGHT ELBOW: ICD-10-CM

## 2023-09-25 ENCOUNTER — NON-APPOINTMENT (OUTPATIENT)
Age: 28
End: 2023-09-25

## 2023-09-25 ENCOUNTER — APPOINTMENT (OUTPATIENT)
Dept: ORTHOPEDIC SURGERY | Facility: CLINIC | Age: 28
End: 2023-09-25
Payer: OTHER MISCELLANEOUS

## 2023-09-25 DIAGNOSIS — S23.9XXA SPRAIN OF UNSPECIFIED PARTS OF THORAX, INITIAL ENCOUNTER: ICD-10-CM

## 2023-09-25 PROCEDURE — 99213 OFFICE O/P EST LOW 20 MIN: CPT

## 2023-10-22 ENCOUNTER — TRANSCRIPTION ENCOUNTER (OUTPATIENT)
Age: 28
End: 2023-10-22

## 2023-10-22 ENCOUNTER — INPATIENT (INPATIENT)
Facility: HOSPITAL | Age: 28
LOS: 0 days | Discharge: HOME | DRG: 770 | End: 2023-10-22
Attending: OBSTETRICS & GYNECOLOGY | Admitting: OBSTETRICS & GYNECOLOGY
Payer: COMMERCIAL

## 2023-10-22 ENCOUNTER — RESULT REVIEW (OUTPATIENT)
Age: 28
End: 2023-10-22

## 2023-10-22 VITALS
DIASTOLIC BLOOD PRESSURE: 53 MMHG | TEMPERATURE: 98 F | HEART RATE: 74 BPM | SYSTOLIC BLOOD PRESSURE: 98 MMHG | OXYGEN SATURATION: 94 % | RESPIRATION RATE: 18 BRPM

## 2023-10-22 VITALS
TEMPERATURE: 98 F | DIASTOLIC BLOOD PRESSURE: 79 MMHG | HEART RATE: 88 BPM | OXYGEN SATURATION: 97 % | SYSTOLIC BLOOD PRESSURE: 124 MMHG | WEIGHT: 214.95 LBS | RESPIRATION RATE: 22 BRPM | HEIGHT: 69 IN

## 2023-10-22 DIAGNOSIS — O03.4 INCOMPLETE SPONTANEOUS ABORTION WITHOUT COMPLICATION: ICD-10-CM

## 2023-10-22 DIAGNOSIS — Z98.890 OTHER SPECIFIED POSTPROCEDURAL STATES: Chronic | ICD-10-CM

## 2023-10-22 LAB
ALBUMIN SERPL ELPH-MCNC: 4.4 G/DL — SIGNIFICANT CHANGE UP (ref 3.5–5.2)
ALBUMIN SERPL ELPH-MCNC: 4.4 G/DL — SIGNIFICANT CHANGE UP (ref 3.5–5.2)
ALP SERPL-CCNC: 89 U/L — SIGNIFICANT CHANGE UP (ref 30–115)
ALP SERPL-CCNC: 89 U/L — SIGNIFICANT CHANGE UP (ref 30–115)
ALT FLD-CCNC: 16 U/L — SIGNIFICANT CHANGE UP (ref 0–41)
ALT FLD-CCNC: 16 U/L — SIGNIFICANT CHANGE UP (ref 0–41)
ANION GAP SERPL CALC-SCNC: 11 MMOL/L — SIGNIFICANT CHANGE UP (ref 7–14)
ANION GAP SERPL CALC-SCNC: 11 MMOL/L — SIGNIFICANT CHANGE UP (ref 7–14)
APPEARANCE UR: ABNORMAL
APPEARANCE UR: ABNORMAL
AST SERPL-CCNC: 11 U/L — SIGNIFICANT CHANGE UP (ref 0–41)
AST SERPL-CCNC: 11 U/L — SIGNIFICANT CHANGE UP (ref 0–41)
BACTERIA # UR AUTO: NEGATIVE /HPF — SIGNIFICANT CHANGE UP
BACTERIA # UR AUTO: NEGATIVE /HPF — SIGNIFICANT CHANGE UP
BASOPHILS # BLD AUTO: 0.03 K/UL — SIGNIFICANT CHANGE UP (ref 0–0.2)
BASOPHILS # BLD AUTO: 0.03 K/UL — SIGNIFICANT CHANGE UP (ref 0–0.2)
BASOPHILS NFR BLD AUTO: 0.3 % — SIGNIFICANT CHANGE UP (ref 0–1)
BASOPHILS NFR BLD AUTO: 0.3 % — SIGNIFICANT CHANGE UP (ref 0–1)
BILIRUB SERPL-MCNC: 0.3 MG/DL — SIGNIFICANT CHANGE UP (ref 0.2–1.2)
BILIRUB SERPL-MCNC: 0.3 MG/DL — SIGNIFICANT CHANGE UP (ref 0.2–1.2)
BILIRUB UR-MCNC: NEGATIVE — SIGNIFICANT CHANGE UP
BILIRUB UR-MCNC: NEGATIVE — SIGNIFICANT CHANGE UP
BLD GP AB SCN SERPL QL: SIGNIFICANT CHANGE UP
BLD GP AB SCN SERPL QL: SIGNIFICANT CHANGE UP
BUN SERPL-MCNC: 15 MG/DL — SIGNIFICANT CHANGE UP (ref 10–20)
BUN SERPL-MCNC: 15 MG/DL — SIGNIFICANT CHANGE UP (ref 10–20)
CALCIUM SERPL-MCNC: 9.5 MG/DL — SIGNIFICANT CHANGE UP (ref 8.4–10.5)
CALCIUM SERPL-MCNC: 9.5 MG/DL — SIGNIFICANT CHANGE UP (ref 8.4–10.5)
CAST: 0 /LPF — SIGNIFICANT CHANGE UP (ref 0–4)
CAST: 0 /LPF — SIGNIFICANT CHANGE UP (ref 0–4)
CHLORIDE SERPL-SCNC: 101 MMOL/L — SIGNIFICANT CHANGE UP (ref 98–110)
CHLORIDE SERPL-SCNC: 101 MMOL/L — SIGNIFICANT CHANGE UP (ref 98–110)
CO2 SERPL-SCNC: 23 MMOL/L — SIGNIFICANT CHANGE UP (ref 17–32)
CO2 SERPL-SCNC: 23 MMOL/L — SIGNIFICANT CHANGE UP (ref 17–32)
COLOR SPEC: ABNORMAL
COLOR SPEC: ABNORMAL
CREAT SERPL-MCNC: 0.7 MG/DL — SIGNIFICANT CHANGE UP (ref 0.7–1.5)
CREAT SERPL-MCNC: 0.7 MG/DL — SIGNIFICANT CHANGE UP (ref 0.7–1.5)
DIFF PNL FLD: ABNORMAL
DIFF PNL FLD: ABNORMAL
EGFR: 121 ML/MIN/1.73M2 — SIGNIFICANT CHANGE UP
EGFR: 121 ML/MIN/1.73M2 — SIGNIFICANT CHANGE UP
EOSINOPHIL # BLD AUTO: 0.24 K/UL — SIGNIFICANT CHANGE UP (ref 0–0.7)
EOSINOPHIL # BLD AUTO: 0.24 K/UL — SIGNIFICANT CHANGE UP (ref 0–0.7)
EOSINOPHIL NFR BLD AUTO: 2.5 % — SIGNIFICANT CHANGE UP (ref 0–8)
EOSINOPHIL NFR BLD AUTO: 2.5 % — SIGNIFICANT CHANGE UP (ref 0–8)
GLUCOSE SERPL-MCNC: 95 MG/DL — SIGNIFICANT CHANGE UP (ref 70–99)
GLUCOSE SERPL-MCNC: 95 MG/DL — SIGNIFICANT CHANGE UP (ref 70–99)
GLUCOSE UR QL: NEGATIVE MG/DL — SIGNIFICANT CHANGE UP
GLUCOSE UR QL: NEGATIVE MG/DL — SIGNIFICANT CHANGE UP
HCG SERPL-ACNC: 6777 MIU/ML — HIGH
HCG SERPL-ACNC: 6777 MIU/ML — HIGH
HCT VFR BLD CALC: 38.9 % — SIGNIFICANT CHANGE UP (ref 37–47)
HCT VFR BLD CALC: 38.9 % — SIGNIFICANT CHANGE UP (ref 37–47)
HGB BLD-MCNC: 13.4 G/DL — SIGNIFICANT CHANGE UP (ref 12–16)
HGB BLD-MCNC: 13.4 G/DL — SIGNIFICANT CHANGE UP (ref 12–16)
IMM GRANULOCYTES NFR BLD AUTO: 0.4 % — HIGH (ref 0.1–0.3)
IMM GRANULOCYTES NFR BLD AUTO: 0.4 % — HIGH (ref 0.1–0.3)
KETONES UR-MCNC: NEGATIVE MG/DL — SIGNIFICANT CHANGE UP
KETONES UR-MCNC: NEGATIVE MG/DL — SIGNIFICANT CHANGE UP
LACTATE SERPL-SCNC: 0.9 MMOL/L — SIGNIFICANT CHANGE UP (ref 0.7–2)
LACTATE SERPL-SCNC: 0.9 MMOL/L — SIGNIFICANT CHANGE UP (ref 0.7–2)
LEUKOCYTE ESTERASE UR-ACNC: ABNORMAL
LEUKOCYTE ESTERASE UR-ACNC: ABNORMAL
LYMPHOCYTES # BLD AUTO: 2.34 K/UL — SIGNIFICANT CHANGE UP (ref 1.2–3.4)
LYMPHOCYTES # BLD AUTO: 2.34 K/UL — SIGNIFICANT CHANGE UP (ref 1.2–3.4)
LYMPHOCYTES # BLD AUTO: 24.3 % — SIGNIFICANT CHANGE UP (ref 20.5–51.1)
LYMPHOCYTES # BLD AUTO: 24.3 % — SIGNIFICANT CHANGE UP (ref 20.5–51.1)
MCHC RBC-ENTMCNC: 31.6 PG — HIGH (ref 27–31)
MCHC RBC-ENTMCNC: 31.6 PG — HIGH (ref 27–31)
MCHC RBC-ENTMCNC: 34.4 G/DL — SIGNIFICANT CHANGE UP (ref 32–37)
MCHC RBC-ENTMCNC: 34.4 G/DL — SIGNIFICANT CHANGE UP (ref 32–37)
MCV RBC AUTO: 91.7 FL — SIGNIFICANT CHANGE UP (ref 81–99)
MCV RBC AUTO: 91.7 FL — SIGNIFICANT CHANGE UP (ref 81–99)
MONOCYTES # BLD AUTO: 0.96 K/UL — HIGH (ref 0.1–0.6)
MONOCYTES # BLD AUTO: 0.96 K/UL — HIGH (ref 0.1–0.6)
MONOCYTES NFR BLD AUTO: 10 % — HIGH (ref 1.7–9.3)
MONOCYTES NFR BLD AUTO: 10 % — HIGH (ref 1.7–9.3)
NEUTROPHILS # BLD AUTO: 6.01 K/UL — SIGNIFICANT CHANGE UP (ref 1.4–6.5)
NEUTROPHILS # BLD AUTO: 6.01 K/UL — SIGNIFICANT CHANGE UP (ref 1.4–6.5)
NEUTROPHILS NFR BLD AUTO: 62.5 % — SIGNIFICANT CHANGE UP (ref 42.2–75.2)
NEUTROPHILS NFR BLD AUTO: 62.5 % — SIGNIFICANT CHANGE UP (ref 42.2–75.2)
NITRITE UR-MCNC: NEGATIVE — SIGNIFICANT CHANGE UP
NITRITE UR-MCNC: NEGATIVE — SIGNIFICANT CHANGE UP
NRBC # BLD: 0 /100 WBCS — SIGNIFICANT CHANGE UP (ref 0–0)
NRBC # BLD: 0 /100 WBCS — SIGNIFICANT CHANGE UP (ref 0–0)
PH UR: 6.5 — SIGNIFICANT CHANGE UP (ref 5–8)
PH UR: 6.5 — SIGNIFICANT CHANGE UP (ref 5–8)
PLATELET # BLD AUTO: 234 K/UL — SIGNIFICANT CHANGE UP (ref 130–400)
PLATELET # BLD AUTO: 234 K/UL — SIGNIFICANT CHANGE UP (ref 130–400)
PMV BLD: 8.9 FL — SIGNIFICANT CHANGE UP (ref 7.4–10.4)
PMV BLD: 8.9 FL — SIGNIFICANT CHANGE UP (ref 7.4–10.4)
POTASSIUM SERPL-MCNC: 4 MMOL/L — SIGNIFICANT CHANGE UP (ref 3.5–5)
POTASSIUM SERPL-MCNC: 4 MMOL/L — SIGNIFICANT CHANGE UP (ref 3.5–5)
POTASSIUM SERPL-SCNC: 4 MMOL/L — SIGNIFICANT CHANGE UP (ref 3.5–5)
POTASSIUM SERPL-SCNC: 4 MMOL/L — SIGNIFICANT CHANGE UP (ref 3.5–5)
PROT SERPL-MCNC: 7.4 G/DL — SIGNIFICANT CHANGE UP (ref 6–8)
PROT SERPL-MCNC: 7.4 G/DL — SIGNIFICANT CHANGE UP (ref 6–8)
PROT UR-MCNC: 100 MG/DL
PROT UR-MCNC: 100 MG/DL
RBC # BLD: 4.24 M/UL — SIGNIFICANT CHANGE UP (ref 4.2–5.4)
RBC # BLD: 4.24 M/UL — SIGNIFICANT CHANGE UP (ref 4.2–5.4)
RBC # FLD: 12.2 % — SIGNIFICANT CHANGE UP (ref 11.5–14.5)
RBC # FLD: 12.2 % — SIGNIFICANT CHANGE UP (ref 11.5–14.5)
RBC CASTS # UR COMP ASSIST: 54 /HPF — HIGH (ref 0–4)
RBC CASTS # UR COMP ASSIST: 54 /HPF — HIGH (ref 0–4)
SODIUM SERPL-SCNC: 135 MMOL/L — SIGNIFICANT CHANGE UP (ref 135–146)
SODIUM SERPL-SCNC: 135 MMOL/L — SIGNIFICANT CHANGE UP (ref 135–146)
SP GR SPEC: 1.01 — SIGNIFICANT CHANGE UP (ref 1–1.03)
SP GR SPEC: 1.01 — SIGNIFICANT CHANGE UP (ref 1–1.03)
SQUAMOUS # UR AUTO: 2 /HPF — SIGNIFICANT CHANGE UP (ref 0–5)
SQUAMOUS # UR AUTO: 2 /HPF — SIGNIFICANT CHANGE UP (ref 0–5)
UROBILINOGEN FLD QL: 0.2 MG/DL — SIGNIFICANT CHANGE UP (ref 0.2–1)
UROBILINOGEN FLD QL: 0.2 MG/DL — SIGNIFICANT CHANGE UP (ref 0.2–1)
WBC # BLD: 9.62 K/UL — SIGNIFICANT CHANGE UP (ref 4.8–10.8)
WBC # BLD: 9.62 K/UL — SIGNIFICANT CHANGE UP (ref 4.8–10.8)
WBC # FLD AUTO: 9.62 K/UL — SIGNIFICANT CHANGE UP (ref 4.8–10.8)
WBC # FLD AUTO: 9.62 K/UL — SIGNIFICANT CHANGE UP (ref 4.8–10.8)
WBC UR QL: 78 /HPF — HIGH (ref 0–5)
WBC UR QL: 78 /HPF — HIGH (ref 0–5)

## 2023-10-22 PROCEDURE — 71045 X-RAY EXAM CHEST 1 VIEW: CPT | Mod: 26

## 2023-10-22 PROCEDURE — 99285 EMERGENCY DEPT VISIT HI MDM: CPT

## 2023-10-22 PROCEDURE — 88305 TISSUE EXAM BY PATHOLOGIST: CPT | Mod: 26

## 2023-10-22 PROCEDURE — 76830 TRANSVAGINAL US NON-OB: CPT | Mod: 26

## 2023-10-22 PROCEDURE — 88305 TISSUE EXAM BY PATHOLOGIST: CPT

## 2023-10-22 PROCEDURE — 59820 CARE OF MISCARRIAGE: CPT

## 2023-10-22 PROCEDURE — 88291 CYTO/MOLECULAR REPORT: CPT

## 2023-10-22 RX ORDER — KETOROLAC TROMETHAMINE 30 MG/ML
15 SYRINGE (ML) INJECTION ONCE
Refills: 0 | Status: DISCONTINUED | OUTPATIENT
Start: 2023-10-22 | End: 2023-10-22

## 2023-10-22 RX ORDER — SODIUM CHLORIDE 9 MG/ML
1000 INJECTION, SOLUTION INTRAVENOUS
Refills: 0 | Status: DISCONTINUED | OUTPATIENT
Start: 2023-10-22 | End: 2023-10-22

## 2023-10-22 RX ORDER — MELOXICAM 15 MG/1
0 TABLET ORAL
Qty: 0 | Refills: 0 | DISCHARGE

## 2023-10-22 RX ORDER — MEPERIDINE HYDROCHLORIDE 50 MG/ML
12.5 INJECTION INTRAMUSCULAR; INTRAVENOUS; SUBCUTANEOUS
Refills: 0 | Status: DISCONTINUED | OUTPATIENT
Start: 2023-10-22 | End: 2023-10-22

## 2023-10-22 RX ORDER — INFLUENZA VIRUS VACCINE 15; 15; 15; 15 UG/.5ML; UG/.5ML; UG/.5ML; UG/.5ML
0.5 SUSPENSION INTRAMUSCULAR ONCE
Refills: 0 | Status: DISCONTINUED | OUTPATIENT
Start: 2023-10-22 | End: 2023-10-22

## 2023-10-22 RX ORDER — MORPHINE SULFATE 50 MG/1
4 CAPSULE, EXTENDED RELEASE ORAL ONCE
Refills: 0 | Status: DISCONTINUED | OUTPATIENT
Start: 2023-10-22 | End: 2023-10-22

## 2023-10-22 RX ORDER — MORPHINE SULFATE 50 MG/1
2 CAPSULE, EXTENDED RELEASE ORAL ONCE
Refills: 0 | Status: DISCONTINUED | OUTPATIENT
Start: 2023-10-22 | End: 2023-10-22

## 2023-10-22 RX ORDER — OXYCODONE HYDROCHLORIDE 5 MG/1
5 TABLET ORAL EVERY 6 HOURS
Refills: 0 | Status: DISCONTINUED | OUTPATIENT
Start: 2023-10-22 | End: 2023-10-22

## 2023-10-22 RX ORDER — ONDANSETRON 8 MG/1
4 TABLET, FILM COATED ORAL ONCE
Refills: 0 | Status: DISCONTINUED | OUTPATIENT
Start: 2023-10-22 | End: 2023-10-22

## 2023-10-22 RX ORDER — SODIUM CHLORIDE 9 MG/ML
1000 INJECTION, SOLUTION INTRAVENOUS ONCE
Refills: 0 | Status: COMPLETED | OUTPATIENT
Start: 2023-10-22 | End: 2023-10-22

## 2023-10-22 RX ORDER — ONDANSETRON 8 MG/1
4 TABLET, FILM COATED ORAL ONCE
Refills: 0 | Status: COMPLETED | OUTPATIENT
Start: 2023-10-22 | End: 2023-10-22

## 2023-10-22 RX ORDER — HYDROMORPHONE HYDROCHLORIDE 2 MG/ML
0.5 INJECTION INTRAMUSCULAR; INTRAVENOUS; SUBCUTANEOUS
Refills: 0 | Status: DISCONTINUED | OUTPATIENT
Start: 2023-10-22 | End: 2023-10-22

## 2023-10-22 RX ORDER — HYDROMORPHONE HYDROCHLORIDE 2 MG/ML
1 INJECTION INTRAMUSCULAR; INTRAVENOUS; SUBCUTANEOUS
Refills: 0 | Status: DISCONTINUED | OUTPATIENT
Start: 2023-10-22 | End: 2023-10-22

## 2023-10-22 RX ORDER — ACETAMINOPHEN 500 MG
1000 TABLET ORAL ONCE
Refills: 0 | Status: COMPLETED | OUTPATIENT
Start: 2023-10-22 | End: 2023-10-22

## 2023-10-22 RX ORDER — KETOROLAC TROMETHAMINE 30 MG/ML
15 SYRINGE (ML) INJECTION EVERY 6 HOURS
Refills: 0 | Status: DISCONTINUED | OUTPATIENT
Start: 2023-10-22 | End: 2023-10-22

## 2023-10-22 RX ADMIN — SODIUM CHLORIDE 1000 MILLILITER(S): 9 INJECTION, SOLUTION INTRAVENOUS at 01:56

## 2023-10-22 RX ADMIN — Medication 15 MILLIGRAM(S): at 03:08

## 2023-10-22 RX ADMIN — MORPHINE SULFATE 2 MILLIGRAM(S): 50 CAPSULE, EXTENDED RELEASE ORAL at 04:27

## 2023-10-22 RX ADMIN — SODIUM CHLORIDE 125 MILLILITER(S): 9 INJECTION, SOLUTION INTRAVENOUS at 04:27

## 2023-10-22 RX ADMIN — Medication 400 MILLIGRAM(S): at 05:13

## 2023-10-22 RX ADMIN — MORPHINE SULFATE 2 MILLIGRAM(S): 50 CAPSULE, EXTENDED RELEASE ORAL at 05:14

## 2023-10-22 RX ADMIN — OXYCODONE HYDROCHLORIDE 5 MILLIGRAM(S): 5 TABLET ORAL at 05:39

## 2023-10-22 RX ADMIN — ONDANSETRON 4 MILLIGRAM(S): 8 TABLET, FILM COATED ORAL at 01:52

## 2023-10-22 RX ADMIN — Medication 15 MILLIGRAM(S): at 06:27

## 2023-10-22 RX ADMIN — MORPHINE SULFATE 4 MILLIGRAM(S): 50 CAPSULE, EXTENDED RELEASE ORAL at 01:51

## 2023-10-22 NOTE — ED PROVIDER NOTE - OBJECTIVE STATEMENT
28 year old female, , who presents with lower abd pain and pelvic pain. patient with LMP 2023, d&c performed x2 days ago, now with gradual worsening of lower abd and pelvic pain prompting ed eval. patient has been taking motrin 800 prn. denies f/c, chest pain, shortness of breath, urinary symptoms. 28 year old female, , who presents with lower abd pain and pelvic pain. patient with LMP 2023, d&c performed x2 days ago @ [facility] by Dr Bartholomew, now with gradual worsening of lower abd and pelvic pain prompting ed eval. patient has been taking motrin 800 prn. denies fever, chest pain, shortness of breath, urinary symptoms, syncope.

## 2023-10-22 NOTE — BRIEF OPERATIVE NOTE - OPERATION/FINDINGS
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 nl appearing cervix and vagina. 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 nl appearing cervix and vagina. Thin stripe visualized on bedside transvaginal sono after sharp and suction curettage

## 2023-10-22 NOTE — ED PROVIDER NOTE - PHYSICAL EXAMINATION
CONSTITUTIONAL: non-toxic appearing female, NAD   SKIN: skin exam is warm and dry  HEAD: Normocephalic; atraumatic  ENT: MMM  CARD: S1, S2 normal, no murmur  RESP: Good air movement bilaterally  ABD: soft; +suprapubic tenderness, no rebound/guarding   EXT: Normal ROM.    NEURO: awake, alert, following commands, oriented, grossly unremarkable. No Focal deficits. GCS 15.   PSYCH: Cooperative, appropriate.

## 2023-10-22 NOTE — H&P ADULT - HISTORY OF PRESENT ILLNESS
28y  LMP  presenting to ED due to severe lower abdominal pain and cramping for the past day. Reports pain is like "labor pains" with passage of small clots. Pain is 10/10, only mildly relieved by pain medication. She has been soaking 1 pad every 2 hours. Endorses chills and shortness of breath secondary to pain. Denies dizziness, chest pain, HA, palpitations, LOC.     Patient is s/p D&C at [facility] on 10/18 with Dr. Bartholomew, she was noted in office to have a missed  measuring 7w5dGA (EGA by LMP 11w2d). Reports mild bleeding and cramping after procedure which intensified over the past day, This was a planned and desired pregnancy.    Ob/Gyn History:    SABx3, D+Cx3  Denies history of ovarian cysts, uterine fibroids, abnormal paps, or STIs

## 2023-10-22 NOTE — ED ADULT NURSE NOTE - NSFALLRISKFACTORS_ED_ALL_ED
October 5, 2018      [facility] [place]  [address], [address]  [place] [address]  Phone: [phone]  Fax: [phone]       Patient: Sahra Vera   YOB: 1999  Date of Visit: 10/05/2018    To Whom It May Concern:    Sara Vera  was at [facility] on 10/05/2018. She may return to work/school on 10/6/2018 with no restrictions. If you have any questions or concerns, or if I can be of further assistance, please do not hesitate to contact me.    Sincerely,    Rufino Cox PA-C      Surgery: Recent surgery, recent lower limb amputation, major abdominal or thoracic surgery

## 2023-10-22 NOTE — ED ADULT TRIAGE NOTE - CHIEF COMPLAINT QUOTE
actual I had a miscarriage and had a DNC done two days ago, the pain is severs since 7 PM, my whole privates feel like its gong to drop down. - patient   Patient three months pregnant, had procedure at [facility] on 10/18, is on Sulfamethoxazole TMP DS for UTI

## 2023-10-22 NOTE — BRIEF OPERATIVE NOTE - NSICDXBRIEFPROCEDURE_GEN_ALL_CORE_FT
PROCEDURES:  Dilation and curettage, uterus, using suction, with US guidance 22-Oct-2023 10:16:55  Cecily Delgadillo

## 2023-10-22 NOTE — DISCHARGE NOTE PROVIDER - NSDCCPTREATMENT_GEN_ALL_CORE_FT
PRINCIPAL PROCEDURE  Procedure: Dilation and evacuation, uterus, using suction curettage  Findings and Treatment:

## 2023-10-22 NOTE — DISCHARGE NOTE NURSING/CASE MANAGEMENT/SOCIAL WORK - PATIENT PORTAL LINK FT
You can access the FollowMyHealth Patient Portal offered by [facility] by registering at the following website: [email or web address]. By joining [employer]’s FollowMyHealth portal, you will also be able to view your health information using other applications (apps) compatible with our system.

## 2023-10-22 NOTE — ED ADULT NURSE NOTE - OBJECTIVE STATEMENT
C/O of pelvic pain and clot with urination.  Patient stated that she had a D&C 2 days ago.  Patient is on prescribed sulfamethoxazole TMP for UTI.

## 2023-10-22 NOTE — ED PROVIDER NOTE - CLINICAL SUMMARY MEDICAL DECISION MAKING FREE TEXT BOX
29 y/o F  p/w pelvic pain after D&C performed 2 days ago at [facility]. Thinks she passed fetal tissue today. Has had 2 prior D&C she states and she never had pain like this. Took Motrin 800 without relief. Pain is constant, cramping, radiating to pelvic region, no exacerbating/alleviating factors, mild active vaginal bleeding. No F/C, no CP, no SOB, no lightheadedness/syncope    On exam, vitals reviewed. Pt uncomfortable appearing, but abd is soft, not peritoneal, mild suprapubic TTP. no CVA TTP. Normal conj. Heart RRR, lungs CTA.     Ddx includes retained PoC, doubt endometritis, less likely perforation, could be complete  now    In ED had labs showing elevated beta but otherwise unremarkable. US showing retained PoC. Pt admitted to ob/gyn for definitive management.

## 2023-10-22 NOTE — ED ADULT NURSE NOTE - NSFALLHARMRISKINTERV_ED_ALL_ED

## 2023-10-22 NOTE — PATIENT PROFILE ADULT - FALL HARM RISK - RISK INTERVENTIONS

## 2023-10-22 NOTE — ED ADULT NURSE NOTE - CHIEF COMPLAINT QUOTE
Walk in I had a miscarriage and had a DNC done two days ago, the pain is severs since 7 PM, my whole privates feel like its gong to drop down. - patient   Patient three months pregnant, had procedure at [facility] on 10/18, is on Sulfamethoxazole TMP DS for UTI

## 2023-10-22 NOTE — DISCHARGE NOTE PROVIDER - NSDCFUSCHEDAPPT_GEN_ALL_CORE_FT
[facility]  [address]  Scheduled Appointment: 10/23/2023

## 2023-10-22 NOTE — DISCHARGE NOTE PROVIDER - HOSPITAL COURSE
DATE OF DISCHARGE: 10-22-23 @ 11:24    HISTORY OF PRESENT ILLNESS/HOSPITAL COURSE: HPI:  28y  LMP  presenting to ED due to severe lower abdominal pain and cramping for the past day. Reports pain is like "labor pains" with passage of small clots. Pain is 10/10, only mildly relieved by pain medication. She has been soaking 1 pad every 2 hours. Endorses chills and shortness of breath secondary to pain. Denies dizziness, chest pain, HA, palpitations, LOC.     Patient is s/p D&C at [facility] on 10/18 with Dr. Bartholomew, she was noted in office to have a missed  measuring 7w5dGA (EGA by LMP 11w2d). Reports mild bleeding and cramping after procedure which intensified over the past day, This was a planned and desired pregnancy.    Ob/Gyn History:    SABx3, D+Cx3  Denies history of ovarian cysts, uterine fibroids, abnormal paps, or STIs    (22 Oct 2023 03:55)    PAST MEDICAL & SURGICAL HISTORY:  Appendicitis      History of appendectomy  2013          PROCEDURES PERFORMED: Term spontaneous vaginal delivery  COMPLICATIONS:  -----   POST PARTUM COURSE: uncomplicated, discharged home on PPD2  FINAL DIAGNOSIS:  Status post normal spontaneous vaginal delivery at Gestational Age    DISCHARGE CBC:                       13.4   9.62  )-----------( 234      ( 22 Oct 2023 01:45 )             38.9     DISCHARGE INSTRUCTIONS:  If you have severe abdominal pain, heavy vaginal bleeding, shortness of breath or chest pain please call your doctor or come to the emergency room.   DIET:  Advance as tolerated.  ACTIVITY:  Advance as tolerated.  Pelvic rest for 6 weeks.  Nothing to be inserted into the vagina for 6 weeks, i.e. no tampons, douching, sexual relations, or tub baths.   FOLLOW UP: Make an appointment to see your doctor as instructed   PRESCRIPTIONS: Prescriptions:

## 2023-10-22 NOTE — DISCHARGE NOTE PROVIDER - NSDCACTIVITY_GEN_ALL_CORE
Return to Work/School allowed/Sex allowed/Do not drive or operate machinery/Showering allowed/Do not make important decisions/Stairs allowed/Driving allowed/Walking - Indoors allowed/No heavy lifting/straining/Walking - Outdoors allowed/Follow Instructions Provided by your Surgical Team

## 2023-10-22 NOTE — H&P ADULT - NSHPLABSRESULTS_GEN_ALL_CORE
LABS:                        13.4   9.62  )-----------( 234      ( 22 Oct 2023 01:45 )             38.9     HCG Quantitative, Serum: 6777.0 mIU/mL (10-22-23 @ 02:35)    Antibody Screen: NEG (10-22-23 @ 02:44)  ABO RH Interpretation: B POS (10-22-23 @ 02:44)    10-22    135  |  101  |  15  ----------------------------<  95  4.0   |  23  |  0.7    Ca    9.5      22 Oct 2023 01:45    TPro  7.4  /  Alb  4.4  /  TBili  0.3  /  DBili  x   /  AST  11  /  ALT  16  /  AlkPhos  89  10-22      Urinalysis Basic - ( 22 Oct 2023 01:45 )    Color: Red / Appearance: Cloudy / S.011 / pH: x  Gluc: 95 mg/dL / Ketone: Negative mg/dL  / Bili: Negative / Urobili: 0.2 mg/dL   Blood: x / Protein: 100 mg/dL / Nitrite: Negative   Leuk Esterase: Large / RBC: 54 /HPF / WBC 78 /HPF   Sq Epi: x / Non Sq Epi: 2 /HPF / Bacteria: Negative /HPF          RADIOLOGY & ADDITIONAL STUDIES:  < from: US Transvaginal (10.22.23 @ 02:58) >    ******PRELIMINARY REPORT******      ******PRELIMINARY REPORT******           INTERPRETATION:  CLINICAL INFORMATION: Pelvic pain, recent history of D&C   2 days ago    LMP: 2023    COMPARISON: Pelvic ultrasound 2019, correlated with CT abdomen   pelvis 2023    TECHNIQUE:  Endovaginal pelvic sonogram only. Colorand Spectral Doppler was   performed.    FINDINGS:  Uterus: 11.1 x 4.6 x 4.8 cm. Irregular appearing gestational sac   containing what appears to the fetal pole measuring 1.1 cm corresponding   to a gestational age of 7 weeks and 1 day. No fetal cardiac activity   detected compatible with a nonviable pregnancy.    Right ovary: Nonvisualized  Left ovary: Nonvisualized    Fluid: None.    IMPRESSION:  Irregular appearing gestational sac containing what appears to the fetal   pole measuring 1.1 cm corresponding to a gestational age of 7 weeks and 1   day. No fetal cardiac activity detected compatible with a nonviable   pregnancy.    < end of copied text >

## 2023-10-22 NOTE — H&P ADULT - NSHPPHYSICALEXAM_GEN_ALL_CORE
Vital Signs Last 24 Hrs  T(F): 97.9 (22 Oct 2023 00:48), Max: 97.9 (22 Oct 2023 00:48)  HR: 88 (22 Oct 2023 00:48) (88 - 88)  BP: 124/79 (22 Oct 2023 00:48) (124/79 - 124/79)  RR: 22 (22 Oct 2023 00:48) (22 - 22)  Height (cm): 175.3 (10-22-23 @ 00:48)  Weight (kg): 97.5 (10-22-23 @ 00:48)  BMI (kg/m2): 31.7 (10-22-23 @ 00:48)  BSA (m2): 2.13 (10-22-23 @ 00:48)    General Appearance - AAOx3, Visibly in pain  Heart - S1S2 regular rate and rhythm  Lung - CTA Bilaterally  Abdomen - Soft, nontender, nondistended, voluntary guarding, no rebound, BS present    GYN/Pelvis:    Labia Majora - Normal  Labia Minora - Normal  Clitoris - Normal  Urethra - Normal  Vagina - Normal rugation, no lesions, scant blood, no abnormal discharge  Cervix - 0.5cm dilated with blood clot palpated just past os, small bleeding noted, no CMT, no abnormal discharge    Uterus:  Size - 8wk sized  Tenderness - None  Mass - None  Freely mobile    Adnexa: non palpable

## 2023-10-22 NOTE — DISCHARGE NOTE NURSING/CASE MANAGEMENT/SOCIAL WORK - NSDCPEFALRISK_GEN_ALL_CORE
For information on Fall & Injury Prevention, visit: [email or web address] OR  [email or web address] OR  https://www.cdc.gov/steadi/patient.html

## 2023-10-22 NOTE — DISCHARGE NOTE PROVIDER - CARE PROVIDER_API CALL
Jannette Evans  Obstetrics and Gynecology  [address] [place] [address]  Phone: [phone]  Fax: [phone]  Follow Up Time: 2 weeks

## 2023-10-22 NOTE — DISCHARGE NOTE NURSING/CASE MANAGEMENT/SOCIAL WORK - NSPROMEDSDISPOSITION_GEN_A_NUR
Patient was seen on 3/13/2019 for acute URI/Influenza. She states that the Tessalon Pearls she was prescribed are not helping her cough and the cough is making it difficult for her to sleep. OTC cough medications have been ineffective as well. Will prescribe Promethazine VC/Codeine 5 ML every 6 hours prn. Patient is agreeable to this and will  prescription today. She has a follow up appt scheduled on 3/20/2019.    bedside

## 2023-10-22 NOTE — H&P ADULT - ASSESSMENT
28y  s/p D&C for MAB at 7w5d on 10/18 with hematometria possible retained POC, on call to OR for D&C, clinically stable    -NPO/IVF  -ambulate PRN  -pain management per protocol  -monitor vitals per routine in ED, q4hrs on floors    d/w Dr. Evans

## 2023-10-23 LAB
CULTURE RESULTS: SIGNIFICANT CHANGE UP
CULTURE RESULTS: SIGNIFICANT CHANGE UP
SPECIMEN SOURCE: SIGNIFICANT CHANGE UP
SPECIMEN SOURCE: SIGNIFICANT CHANGE UP

## 2023-10-25 LAB
SURGICAL PATHOLOGY STUDY: SIGNIFICANT CHANGE UP
SURGICAL PATHOLOGY STUDY: SIGNIFICANT CHANGE UP

## 2023-10-27 DIAGNOSIS — O02.1 MISSED ABORTION: ICD-10-CM

## 2023-11-08 LAB
CHROM ANALY OVERALL INTERP SPEC-IMP: SIGNIFICANT CHANGE UP
CHROM ANALY OVERALL INTERP SPEC-IMP: SIGNIFICANT CHANGE UP

## 2023-11-10 ENCOUNTER — APPOINTMENT (OUTPATIENT)
Dept: PAIN MANAGEMENT | Facility: CLINIC | Age: 28
End: 2023-11-10
Payer: OTHER MISCELLANEOUS

## 2023-11-10 PROCEDURE — 99442: CPT

## 2023-11-13 ENCOUNTER — APPOINTMENT (OUTPATIENT)
Dept: OBGYN | Facility: CLINIC | Age: 28
End: 2023-11-13
Payer: COMMERCIAL

## 2023-11-13 VITALS — HEIGHT: 69 IN | BODY MASS INDEX: 31.84 KG/M2 | WEIGHT: 215 LBS

## 2023-11-13 DIAGNOSIS — R76.8 OTHER SPECIFIED ABNORMAL IMMUNOLOGICAL FINDINGS IN SERUM: ICD-10-CM

## 2023-11-13 DIAGNOSIS — Q51.9 CONGENITAL MALFORMATION OF UTERUS AND CERVIX, UNSPECIFIED: ICD-10-CM

## 2023-11-13 PROCEDURE — 99215 OFFICE O/P EST HI 40 MIN: CPT | Mod: 24

## 2023-11-22 ENCOUNTER — APPOINTMENT (OUTPATIENT)
Dept: OBGYN | Facility: CLINIC | Age: 28
End: 2023-11-22

## 2023-11-22 ENCOUNTER — APPOINTMENT (OUTPATIENT)
Dept: OBGYN | Facility: CLINIC | Age: 28
End: 2023-11-22
Payer: COMMERCIAL

## 2023-11-22 VITALS — BODY MASS INDEX: 31.84 KG/M2 | WEIGHT: 215 LBS | HEIGHT: 69 IN

## 2023-11-22 DIAGNOSIS — R35.0 FREQUENCY OF MICTURITION: ICD-10-CM

## 2023-11-22 LAB
BILIRUB UR QL STRIP: NORMAL
GLUCOSE UR-MCNC: NORMAL
HCG UR QL: 0.2
HGB UR QL STRIP.AUTO: NORMAL
KETONES UR-MCNC: NORMAL
LEUKOCYTE ESTERASE UR QL STRIP: NORMAL
NITRITE UR QL STRIP: NORMAL
PH UR STRIP: 5
PROT UR STRIP-MCNC: NORMAL
SP GR UR STRIP: 1.02

## 2023-11-22 PROCEDURE — 99213 OFFICE O/P EST LOW 20 MIN: CPT | Mod: 24

## 2023-11-22 PROCEDURE — 81003 URINALYSIS AUTO W/O SCOPE: CPT | Mod: QW

## 2023-11-27 ENCOUNTER — NON-APPOINTMENT (OUTPATIENT)
Age: 28
End: 2023-11-27

## 2023-12-02 ENCOUNTER — LABORATORY RESULT (OUTPATIENT)
Age: 28
End: 2023-12-02

## 2023-12-21 ENCOUNTER — NON-APPOINTMENT (OUTPATIENT)
Age: 28
End: 2023-12-21

## 2024-01-02 ENCOUNTER — NON-APPOINTMENT (OUTPATIENT)
Age: 29
End: 2024-01-02

## 2024-01-02 ENCOUNTER — APPOINTMENT (OUTPATIENT)
Dept: OBGYN | Facility: CLINIC | Age: 29
End: 2024-01-02
Payer: COMMERCIAL

## 2024-01-02 VITALS — WEIGHT: 215 LBS | BODY MASS INDEX: 31.84 KG/M2 | HEIGHT: 69 IN

## 2024-01-02 DIAGNOSIS — N89.8 OTHER SPECIFIED NONINFLAMMATORY DISORDERS OF VAGINA: ICD-10-CM

## 2024-01-02 DIAGNOSIS — N96 RECURRENT PREGNANCY LOSS: ICD-10-CM

## 2024-01-02 LAB
ALBUMIN SERPL ELPH-MCNC: 4.2 G/DL
ALP BLD-CCNC: 93 U/L
ALT SERPL-CCNC: 13 U/L
ANA PAT FLD IF-IMP: NORMAL
ANA SER IF-ACNC: ABNORMAL
ANDROST SERPL-MCNC: 133 NG/DL
ANION GAP SERPL CALC-SCNC: 11 MMOL/L
ANTI-MUELLERIAN HORMONE: 4.48 NG/ML
AR GENE MUT ANL BLD/T: NORMAL
AST SERPL-CCNC: 13 U/L
AT III PPP CHRO-ACNC: 100 %
B2 GLYCOPROT1 AB SER QL: NEGATIVE
B2 GLYCOPROT1 IGG SER-ACNC: <5 SGU
B2 GLYCOPROT1 IGM SER-ACNC: <5 SMU
BACTERIA UR CULT: NORMAL
BILIRUB SERPL-MCNC: 0.5 MG/DL
BUN SERPL-MCNC: 14 MG/DL
BV BACTERIA RRNA VAG QL NAA+PROBE: DETECTED
C GLABRATA RNA VAG QL NAA+PROBE: NOT DETECTED
C TRACH RRNA SPEC QL NAA+PROBE: NOT DETECTED
CALCIUM SERPL-MCNC: 9.3 MG/DL
CANDIDA RRNA VAG QL PROBE: NOT DETECTED
CARDIOLIPIN AB SER IA-ACNC: NEGATIVE
CARDIOLIPIN IGM SER-MCNC: 5.9 MPL
CARDIOLIPIN IGM SER-MCNC: <5 GPL
CFTR MUT TESTED BLD/T: NEGATIVE
CHLORIDE SERPL-SCNC: 103 MMOL/L
CO2 SERPL-SCNC: 25 MMOL/L
CONFIRM: NORMAL
CREAT SERPL-MCNC: 0.7 MG/DL
DHEA-S SERPL-MCNC: 245 UG/DL
DNA PLOIDY SPEC FC-IMP: NORMAL
DRVVT IMM 1:2 NP PPP: NORMAL
DRVVT SCREEN TO CONFIRM RATIO: 0.99 RATIO
EGFR: 121 ML/MIN/1.73M2
ESTIMATED AVERAGE GLUCOSE: 85 MG/DL
ESTRADIOL SERPL-MCNC: 34 PG/ML
FMR1 GENE MUT ANL BLD/T: NORMAL
FSH SERPL-MCNC: 5.3 IU/L
GLUCOSE SERPL-MCNC: 90 MG/DL
HBA1C MFR BLD HPLC: 4.6 %
N GONORRHOEA RRNA SPEC QL NAA+PROBE: NOT DETECTED
POTASSIUM SERPL-SCNC: 4.1 MMOL/L
PROLACTIN SERPL-MCNC: 15.4 NG/ML
PROT C PPP CHRO-ACNC: 141 %
PROT S AG ACT/NOR PPP IA: 100 %
PROT SERPL-MCNC: 7.3 G/DL
PTR INTERP: NORMAL
SCREEN DRVVT: NORMAL
SODIUM SERPL-SCNC: 139 MMOL/L
T VAGINALIS RRNA SPEC QL NAA+PROBE: NOT DETECTED
TESTOST FREE SERPL-MCNC: 2.6 PG/ML
TESTOST SERPL-MCNC: 40.6 NG/DL
TSH SERPL-ACNC: 1.23 UIU/ML

## 2024-01-02 PROCEDURE — 76830 TRANSVAGINAL US NON-OB: CPT

## 2024-01-02 PROCEDURE — 99214 OFFICE O/P EST MOD 30 MIN: CPT | Mod: 25

## 2024-01-02 NOTE — DISCUSSION/SUMMARY
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  - Discussed with patient and  today that labs are grossly normal aside from elevated MELANIE which was known prior. Patient has made an appointment with rheumatology for further follow up - Discussed with patient and  referral for RIVAS for recurrent miscarrige and no evidence of uterine anomaly, normal fetal cytogenetics and normal maternal labs. Referral information given.

## 2024-01-02 NOTE — PROCEDURE
[F/U Abnormal US] : f/u abnormal ultrasound [Transvaginal Ultrasound] : transvaginal ultrasound [Retroverted] : retroverted [L: ___ cm] : L: [unfilled] cm [W: ___cm] : W: [unfilled] cm [FreeTextEntry5] : ES: possible septum [FreeTextEntry7] : 3.35 x 1.51 cm [FreeTextEntry8] : 2.26 x 1.91 cm [FreeTextEntry6] : cervix: 3.15 cm [FreeTextEntry4] : retroverted retroflexed uterus, possible septum.

## 2024-01-02 NOTE — PHYSICAL EXAM
[Soft] : soft [Non-tender] : non-tender [No Lesions] : no lesions [No Mass] : no mass [Labia Majora] : normal [Labia Minora] : normal [Normal] : normal

## 2024-01-02 NOTE — HISTORY OF PRESENT ILLNESS
[FreeTextEntry1] : 27 yo presents for follow up after recurrent miscarriages. Reports has some vaginal discharge and odor.  She went for pelvic MRI but no comment was made about evaluation for uterine cavity only findings of retroverted uterus and fibroid.

## 2024-01-03 NOTE — ED PROVIDER NOTE - PSH
Patient is received on his bed on the fetal position. Patient did not respond to mental health assessment questions. Patient refused court order PO medications and IM injections were then injected. Safety plan to monitor patient movement discussed and implemented by staff. Q15 minute safety checks conducted. Will continue to monitor for change in patient condition.    History of appendectomy

## 2024-01-05 ENCOUNTER — NON-APPOINTMENT (OUTPATIENT)
Age: 29
End: 2024-01-05

## 2024-01-05 LAB
BV BACTERIA RRNA VAG QL NAA+PROBE: DETECTED
C GLABRATA RNA VAG QL NAA+PROBE: NOT DETECTED
C TRACH RRNA SPEC QL NAA+PROBE: NOT DETECTED
CANDIDA RRNA VAG QL PROBE: NOT DETECTED
N GONORRHOEA RRNA SPEC QL NAA+PROBE: NOT DETECTED
T VAGINALIS RRNA SPEC QL NAA+PROBE: NOT DETECTED

## 2024-02-05 ENCOUNTER — NON-APPOINTMENT (OUTPATIENT)
Age: 29
End: 2024-02-05

## 2024-02-05 ENCOUNTER — APPOINTMENT (OUTPATIENT)
Dept: ORTHOPEDIC SURGERY | Facility: CLINIC | Age: 29
End: 2024-02-05
Payer: OTHER MISCELLANEOUS

## 2024-02-05 PROCEDURE — 99213 OFFICE O/P EST LOW 20 MIN: CPT
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
## 2024-02-05 NOTE — IMAGING
[de-identified] : Cervical spine some spasm diffuse tenderness\par  \par  Lumbar spine spasm limited motion in all planes tested positive straight leg bilaterally\par  \par   left wrist diffuse tenderness\par   right hand diffusely tender decreased \par  \par   left knee good motion tenderness mediolateral joint line minimal swelling\par  \par  X-rays left knee unremarkable\par  \par   left ankle  tenderness on both medial lateral side mild swelling negative anterior drawer Achilles intact
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
## 2024-02-05 NOTE — ASSESSMENT
[FreeTextEntry1] : recommended therapy of the cervical and lumbar spine,  Ibuprofen and flexeril  help I asked that a copy of the nerve tests be provided she does appear quite stressed, clinically I sense that she may have PTSD and will benefit from some continued psychological assistance she has gone x4 I will see her in 3 months at this time she is totally disabled unable to work follow-up with pain management again recommended MRI of the left knee she is using a cane and wrist brace. and back brace I expressed my concern that most if not all of the symptoms are psychogenic

## 2024-02-06 ENCOUNTER — APPOINTMENT (OUTPATIENT)
Dept: ELECTROPHYSIOLOGY | Facility: CLINIC | Age: 29
End: 2024-02-06
Payer: COMMERCIAL

## 2024-02-06 VITALS
HEIGHT: 69 IN | DIASTOLIC BLOOD PRESSURE: 90 MMHG | SYSTOLIC BLOOD PRESSURE: 140 MMHG | BODY MASS INDEX: 31.84 KG/M2 | HEART RATE: 77 BPM | WEIGHT: 215 LBS

## 2024-02-06 DIAGNOSIS — Z82.41 FAMILY HISTORY OF SUDDEN CARDIAC DEATH: ICD-10-CM

## 2024-02-06 DIAGNOSIS — Z78.9 OTHER SPECIFIED HEALTH STATUS: ICD-10-CM

## 2024-02-06 DIAGNOSIS — R00.2 PALPITATIONS: ICD-10-CM

## 2024-02-06 PROCEDURE — 99204 OFFICE O/P NEW MOD 45 MIN: CPT | Mod: 25

## 2024-02-06 PROCEDURE — 93000 ELECTROCARDIOGRAM COMPLETE: CPT

## 2024-02-06 RX ORDER — CYCLOBENZAPRINE HYDROCHLORIDE 10 MG/1
10 TABLET, FILM COATED ORAL 3 TIMES DAILY
Qty: 90 | Refills: 1 | Status: DISCONTINUED | COMMUNITY
Start: 2022-12-19 | End: 2024-02-06

## 2024-02-06 RX ORDER — IBUPROFEN 600 MG/1
600 TABLET, FILM COATED ORAL
Qty: 90 | Refills: 2 | Status: DISCONTINUED | COMMUNITY
Start: 2022-12-19 | End: 2024-02-06

## 2024-02-06 RX ORDER — GABAPENTIN 600 MG/1
600 TABLET, COATED ORAL TWICE DAILY
Qty: 60 | Refills: 0 | Status: DISCONTINUED | COMMUNITY
Start: 2023-08-21 | End: 2024-02-06

## 2024-02-06 RX ORDER — METOPROLOL SUCCINATE 25 MG/1
25 TABLET, EXTENDED RELEASE ORAL DAILY
Refills: 0 | Status: ACTIVE | COMMUNITY

## 2024-02-06 RX ORDER — IBUPROFEN 600 MG/1
600 TABLET, FILM COATED ORAL
Qty: 90 | Refills: 2 | Status: DISCONTINUED | COMMUNITY
Start: 2022-08-23 | End: 2024-02-06

## 2024-02-06 RX ORDER — METRONIDAZOLE 7.5 MG/G
0.75 GEL VAGINAL
Qty: 1 | Refills: 1 | Status: DISCONTINUED | COMMUNITY
Start: 2024-01-05 | End: 2024-02-06

## 2024-02-06 RX ORDER — CYCLOBENZAPRINE HYDROCHLORIDE 10 MG/1
10 TABLET, FILM COATED ORAL 3 TIMES DAILY
Qty: 90 | Refills: 1 | Status: DISCONTINUED | COMMUNITY
Start: 2023-03-13 | End: 2024-02-06

## 2024-02-06 RX ORDER — IBUPROFEN 600 MG/1
600 TABLET, FILM COATED ORAL
Qty: 90 | Refills: 2 | Status: DISCONTINUED | COMMUNITY
Start: 2023-03-13 | End: 2024-02-06

## 2024-02-06 RX ORDER — IBUPROFEN 800 MG
800 TABLET ORAL
Refills: 0 | Status: DISCONTINUED | COMMUNITY
End: 2024-02-06

## 2024-02-06 RX ORDER — CYCLOBENZAPRINE HYDROCHLORIDE 10 MG/1
10 TABLET, FILM COATED ORAL 3 TIMES DAILY
Qty: 90 | Refills: 0 | Status: DISCONTINUED | COMMUNITY
Start: 2022-08-23 | End: 2024-02-06

## 2024-02-06 NOTE — CARDIOLOGY SUMMARY
Routing refill request to provider for review/approval because:  Drug not on the [facility] refill protocol     Hubert ALEGRIA RN 4/24/2023 at 3:05 PM         [de-identified] : 2/6/2024 NSR with sinus arrhythmia (HR 77 bpm) [de-identified] : never [de-identified] : 1 mo ago at Dr. Strong's [de-identified] : never

## 2024-02-06 NOTE — PHYSICAL EXAM
[Well Developed] : well developed [Well Nourished] : well nourished [No Acute Distress] : no acute distress [Obese] : obese [Normal Conjunctiva] : normal conjunctiva [Normal S1, S2] : normal S1, S2 [No Murmur] : no murmur [Clear Lung Fields] : clear lung fields [Good Air Entry] : good air entry [No Respiratory Distress] : no respiratory distress  [Soft] : abdomen soft [Normal Gait] : normal gait [No Edema] : no edema [No Rash] : no rash [Moves all extremities] : moves all extremities [No Focal Deficits] : no focal deficits [Normal Speech] : normal speech [Alert and Oriented] : alert and oriented [Normal memory] : normal memory

## 2024-02-06 NOTE — ASSESSMENT
[FreeTextEntry1] : # Palpitations - 30 day event monitor. I educated the patient about the patient symptom activator feature and I have asked them to activate the event monitor whenever they have symptoms.  - Check labs today including CBC, CMP, TSH, Free T4 - Obtain recent echo results - Exercise treadmill stress test to evaluate for exercise induced arrhythmias - Pulm referral to assess for CYNTHIA  I have also advised the patient to go to the nearest emergency room if she experiences any chest pain, dyspnea, syncope, or has any other compelling symptoms.  Follow up in 6 weeks to review results.

## 2024-02-06 NOTE — HISTORY OF PRESENT ILLNESS
[FreeTextEntry1] : Referring: Dr. Melton Cardio: Dr. Strong  28 yo F with history of thyroid nodule s/p biopsy here for evaluation of palpitations for several years with sudden onset/offset of tachycardia with HR up to 170-190s noted on her BP machine. SBP is usually in the 100s. Symptoms occur every 2-3 weeks and can last anywhere from 30 min to 2 hours. Episodes can occur sporadically and at rest, but she has not noted any triggers. Drinks 1 cup of coffee. No other caffeine. Currently not working. Denies any herbal supplements or energy drinks. While she is having an episode, she has associated dyspnea, chest discomfort, and feels dizzy/lightheaded. No history of syncope. Most recent episode was 2 days ago and woke her up from sleeping. Of note, she has had three miscarriages and is wondering if her heart issues could be contributing.

## 2024-02-06 NOTE — DISCUSSION/SUMMARY
[FreeTextEntry1] : I have recommended an event monitor to further evaluate her symptoms to determine if the symptoms may be related to a cardiac arrhythmia.  I educated the patient about the patient symptom activator feature and I have asked her to activate the event monitor whenever she has symptoms.  [EKG obtained to assist in diagnosis and management of assessed problem(s)] : EKG obtained to assist in diagnosis and management of assessed problem(s)

## 2024-02-29 ENCOUNTER — APPOINTMENT (OUTPATIENT)
Dept: PAIN MANAGEMENT | Facility: CLINIC | Age: 29
End: 2024-02-29
Payer: OTHER MISCELLANEOUS

## 2024-02-29 DIAGNOSIS — M54.50 LOW BACK PAIN, UNSPECIFIED: ICD-10-CM

## 2024-02-29 DIAGNOSIS — M54.2 CERVICALGIA: ICD-10-CM

## 2024-02-29 PROCEDURE — 99214 OFFICE O/P EST MOD 30 MIN: CPT | Mod: ACP

## 2024-02-29 NOTE — REVIEW OF SYSTEMS
[Arthralgia] : arthralgia [Joint Stiffness] : joint stiffness [Joint Pain] : joint pain [Negative] : Constitutional

## 2024-03-01 PROBLEM — M54.50 LOW BACK PAIN WITHOUT SCIATICA, UNSPECIFIED BACK PAIN LATERALITY, UNSPECIFIED CHRONICITY: Status: ACTIVE | Noted: 2024-03-01

## 2024-03-01 NOTE — PHYSICAL EXAM
[Flexion] : flexion [Extension] : extension [FreeTextEntry8] : Currently wearing a LSO brace. [] : ambulation with cane

## 2024-03-01 NOTE — WORK
[Other: ___] : [unfilled] [Was the competent medical cause of the injury] : was the competent medical cause of the injury [Are consistent with the injury] : are consistent with the injury [Consistent with my objective findings] : consistent with my objective findings [Total (100%)] : total (100%) [Cannot return to work because ________] : cannot return to work because [unfilled] [Does not reveal pre-existing condition(s) that may affect treatment/prognosis] : does not reveal pre-existing condition(s) that may affect treatment/prognosis [Patient] : patient [Rx may affect patient's ability to return to work, make patient drowsy, or other issue] : Rx may affect patient's ability to return to work, make patient drowsy, or other issue. [I provided the services listed above] :  I provided the services listed above. [Less than Sedentary Work:] :  Less than Sedentary Work: Unable to meet the requirement of Sedentary Work. [FreeTextEntry1] : Guarded

## 2024-03-01 NOTE — ASSESSMENT
[FreeTextEntry1] : Patient is a 29-year-old woman with lumbar pain with radiation of pain into her lower extremities and severe cervical pain which radiates into her upper extremities sustained in a work-related injury on January 28, 2022. Cervical and lumbar radiculopathy are most bothersome at this time. She medically managed with gabapentin once daily and tizanidine. We refilled her tizanidine today. She was denied for PT. We discussed possible injection therapy, however, the pt states that her OBGYN advised against it since she recently had a miscarriage.  She will follow up in 4-6 weeks for further evaluation.   JOSE Yen MD

## 2024-03-01 NOTE — HISTORY OF PRESENT ILLNESS
[FreeTextEntry1] : ORIGINAL PRESENTATION: Ms. Stanley is a 29 year-old woman complaining of neck, back, left leg pain sustained in a work related injury on January 28, 2022. 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 Her colleague was on a pallet mary which ran over her left lower leg. She fell onto the left arm, left hand, and started to develop pain to her neck and lower back. Patient has finished her sessions of physical therapy for her neck and lower back which provided no sustained relief. She is going to begin PT for her left leg. Neck pain radiates to her left arm. She states she has limited ROM when turning her neck left and right. It tends to crack when moving it. Back pain radiates downwards to left leg. Bending forward hurts more than backwards. Neck pain = Back pain but she would like to proceed with treatment of the neck first. She has difficulty eating. She also has migraines and tends to wake up with severe headaches. She is wearing a Cam boot on the left leg and left wrist brace. EMG was performed 2 days ago and does not have results with her. She takes cyclobenzaprine and ibuprofen for pain.   The pain started after an injury at work. Patient describes pain as severe rated at a 10/10. During the last month the pain has been constant with symptoms worsening in no typical pattern. Pain described as burning, sharp, pressure-like, cramping, dull aching, throbbing, shooting or cutting. Pain is associated with numbness/pins and needles. Pain is increased with lying down, standing, sitting, walking, exercise, relaxation, coughing sneezing or bowel movements. Bowel or bladder habits have changed.  ACTIVITIES: Patient could walk 0 blocks before the pain starts. Patient can stand 15-20 minutes before pain starts. The patient often, constantly lies down because of pain. Patient uses no assisted walking device at this time. Patient has difficulty performing household chores, going to work, doing yardwork or shopping, socializing with friends, participating in recreational activities or exercising at this time.  PRIOR PAIN TREATMENTS: Moderate relief with psychotherapy.   Prior Pain Medications: Tylenol  FOLLOW UP:  Pt is a very pleasant 29 year old female accompanied by her  who is here today for a revisit, she was last seen on 11/24/23. She sees us under WC. She ambulates with a cane.  She is under our care for lumbar pain with radiation of pain into her lower extremities and severe cervical pain which radiates into her upper extremities after sustaining a work related injury on January 28, 2022. She feels as though her hands and arms can go completely numb at times. She is unable to perform her ADL's and work around the house due to her pain. She says she has been reliant on her  to perform her daily tasks. She wears a lumbar support brace. PT of the cervical and lumbar spine has been denied. She was advised to speak to her  about this.   She was previously started on gabapentin 600 mg BID. During that time, she got pregnant and therefore stopped her medication. Unfortunately, she had a miscarriage and was then allowed to restart the medication. She is taking it once a day. She states that her mom gives her the medication because she is forgetful. She is also on tizanidine, which helps with her muscle spasms.  We discussed possible injection therapy, however, the pt states that her OBGYN advised against it since she recently had a miscarriage and is unsure if she wants to get pregnant in the near future.  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 She also notes an increase in anxiety as well.

## 2024-03-01 NOTE — DATA REVIEWED
[FreeTextEntry1] : 03/21/2022 MRI CERVICAL SPINE : C4-5 broad-based central disc herniation impinges thecal sac and abuts the spinal cord. C5-6 broad-based central and left subarticular disc herniation impinges the thecal sac and effaces the left lateral recess.  03/23/2022 MRI LUMBAR SPINE : L2-3 disc bulge indents the thecal sac. The bulge narrows the neural foramina. L3-4 disc bulge indents the thecal sac and narrowing the foramina with bilateral facet effusions. L4-L5 there is no spinal or neural foraminal stenosis. Bilateral facet effusions. L5-S1 no spinal canal or neural foraminal stenosis bilateral facet effusions.  SOAPP: low on 3/29/23 Low risk: Patient has combination of a low risk SOAP and no risk factors. UDS would be repeated randomly every quarter.  UDS: 3/29/2023  Neg all.  [place] REGISTRY: Checked.

## 2024-03-04 ENCOUNTER — APPOINTMENT (OUTPATIENT)
Dept: CARDIOLOGY | Facility: CLINIC | Age: 29
End: 2024-03-04
Payer: COMMERCIAL

## 2024-03-04 PROCEDURE — 93351 STRESS TTE COMPLETE: CPT

## 2024-03-19 ENCOUNTER — APPOINTMENT (OUTPATIENT)
Dept: ELECTROPHYSIOLOGY | Facility: CLINIC | Age: 29
End: 2024-03-19

## 2024-03-21 DIAGNOSIS — N91.1 SECONDARY AMENORRHEA: ICD-10-CM

## 2024-03-21 RX ORDER — MEDROXYPROGESTERONE ACETATE 10 MG/1
10 TABLET ORAL DAILY
Qty: 10 | Refills: 0 | Status: ACTIVE | COMMUNITY
Start: 2024-03-21 | End: 1900-01-01

## 2024-05-01 NOTE — ED ADULT TRIAGE NOTE - WEIGHT IN KG
Patient: Marisa Uribe    Procedure Summary       Date: 05/01/24 Room / Location: [facility] OR 01 / [facility] MAIN OR    Anesthesia Start: 0719 Anesthesia Stop: 0848    Procedure: LEFT TOTAL HIP ARTHROPLASTY (Left: Hip) Diagnosis:       Primary osteoarthritis of left hip      (Primary osteoarthritis of left hip [M16.12])    Surgeons: Bg Douglass MD Provider: Chyna Haney MD    Anesthesia Type: general ASA Status: 2            Anesthesia Type: general    Vitals  Vitals Value Taken Time   /62 05/01/24 0922   Temp 36.4 °C (97.6 °F) 05/01/24 0850   Pulse 55 05/01/24 0926   Resp 16 05/01/24 0900   SpO2 91 % 05/01/24 0926   Vitals shown include unfiled device data.        Post Anesthesia Care and Evaluation    Patient location during evaluation: bedside  Patient participation: complete - patient participated  Level of consciousness: awake  Pain management: adequate    Airway patency: patent  PONV Status: none  Cardiovascular status: acceptable and stable  Respiratory status: acceptable  Hydration status: acceptable    Comments: An Anesthesiologist personally participated in the most demanding procedures (including induction and emergence if applicable) in the anesthesia plan, monitored the course of anesthesia administration at frequent intervals and remained physically present and available for immediate diagnosis and treatment of emergencies.           99.8

## 2024-05-09 ENCOUNTER — APPOINTMENT (OUTPATIENT)
Dept: PAIN MANAGEMENT | Facility: CLINIC | Age: 29
End: 2024-05-09
Payer: OTHER MISCELLANEOUS

## 2024-05-09 DIAGNOSIS — M54.12 RADICULOPATHY, CERVICAL REGION: ICD-10-CM

## 2024-05-09 DIAGNOSIS — M54.16 RADICULOPATHY, LUMBAR REGION: ICD-10-CM

## 2024-05-09 PROCEDURE — 99214 OFFICE O/P EST MOD 30 MIN: CPT | Mod: ACP

## 2024-05-09 RX ORDER — GABAPENTIN 600 MG/1
600 TABLET, COATED ORAL TWICE DAILY
Qty: 60 | Refills: 1 | Status: ACTIVE | COMMUNITY
Start: 2024-05-09 | End: 1900-01-01

## 2024-05-09 RX ORDER — TIZANIDINE 4 MG/1
4 TABLET ORAL
Qty: 90 | Refills: 1 | Status: ACTIVE | COMMUNITY
Start: 2023-04-26 | End: 1900-01-01

## 2024-05-09 NOTE — PHYSICAL EXAM
[Flexion] : flexion [Extension] : extension [] : ambulation with cane [FreeTextEntry8] : Currently wearing a LSO brace.

## 2024-05-09 NOTE — DISCUSSION/SUMMARY
[Medication Risks Reviewed] : Medication risks reviewed [de-identified] : Patient is a 29-year-old woman with lumbar pain with radiation of pain into her lower extremities and severe cervical pain which radiates into her upper extremities sustained in a work-related injury on January 28, 2022. Cervical and lumbar radiculopathy are most bothersome at this time. She medically managed with Gabapentin BID and Tizanidine. We discussed possible injection therapy, however, the pt states that her OBGYN advised against it since she recently had a miscarriage. At this time Physical Therapy is the only viable treatment option and we will give her a referral today. She will follow up in 8 weeks for further evaluation.   Physical therapy of the Cervical and Lumbar spine 2-3 times a week for 4-8 weeks stressing a home exercise program of walking, shoulder griddle strengthening, swimming, elliptical , recumbent bike, Ward chi and Yoga. Use things that heat like hot shower or icy heat before rehab, exercising and at the beginning of the day, and ice (ice in a bag never directly on the skin) after activity and at the end of the day.  Total clinician time spent today on the patient is 30 minutes including preparing to see the patient, obtaining and/or reviewing and confirming history, performing medically necessary and appropriate examination, counseling and educating the patient and/or family, documenting clinical information in the EHR and communicating and/or referring to other healthcare professionals.  JOSE Valentin MD

## 2024-05-09 NOTE — HISTORY OF PRESENT ILLNESS
[FreeTextEntry1] : ORIGINAL PRESENTATION: Ms. Stanley is a 29 year-old woman complaining of neck, back, left leg pain sustained in a work related injury on January 28, 2022. 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 Her colleague was on a pallet mary which ran over her left lower leg. She fell onto the left arm, left hand, and started to develop pain to her neck and lower back. Patient has finished her sessions of physical therapy for her neck and lower back which provided no sustained relief. She is going to begin PT for her left leg. Neck pain radiates to her left arm. She states she has limited ROM when turning her neck left and right. It tends to crack when moving it. Back pain radiates downwards to left leg. Bending forward hurts more than backwards. Neck pain = Back pain but she would like to proceed with treatment of the neck first. She has difficulty eating. She also has migraines and tends to wake up with severe headaches. She is wearing a Cam boot on the left leg and left wrist brace. EMG was performed 2 days ago and does not have results with her. She takes cyclobenzaprine and ibuprofen for pain.   The pain started after an injury at work. Patient describes pain as severe rated at a 10/10. During the last month the pain has been constant with symptoms worsening in no typical pattern. Pain described as burning, sharp, pressure-like, cramping, dull aching, throbbing, shooting or cutting. Pain is associated with numbness/pins and needles. Pain is increased with lying down, standing, sitting, walking, exercise, relaxation, coughing sneezing or bowel movements. Bowel or bladder habits have changed.  ACTIVITIES: Patient could walk 0 blocks before the pain starts. Patient can stand 15-20 minutes before pain starts. The patient often, constantly lies down because of pain. Patient uses no assisted walking device at this time. Patient has difficulty performing household chores, going to work, doing yardwork or shopping, socializing with friends, participating in recreational activities or exercising at this time.  PRIOR PAIN TREATMENTS: Moderate relief with psychotherapy.   Prior Pain Medications: Tylenol  FOLLOW UP: Last seen on 02/29/2024 and today presents with persistent lower back pain with radiation of pain into left lower extremity and difficulties ambulating. She ambulates with a cane. There is also a complaint of persistent neck pain. Since injections are not the option at this time(she has not been cleared by her GYN) it is recommended to start Physical Therapy. Patient underwent Physical Therapy over a year ago and reports great pain relief of at least 75% and improvement of functioning after completion of PT. At this time it is medically necessary to start Physical Therapy. We will also continue Gabapentin and Tizanidine since they provide at least 45% of pain relief.    She is under our care for lumbar pain with radiation of pain into her lower extremities and severe cervical pain which radiates into her upper extremities after sustaining a work related injury on January 28, 2022. She feels as though her hands and arms can go completely numb at times. She is unable to perform her ADL's and work around the house due to her pain. She says she has been reliant on her  to perform her daily tasks. She wears a lumbar support brace. PT of the cervical and lumbar spine has been denied. She was advised to speak to her  about this.   She was previously started on gabapentin 600 mg BID. During that time, she got pregnant and therefore stopped her medication. Unfortunately, she had a miscarriage and was then allowed to restart the medication. She is taking it once a day. She states that her mom gives her the medication because she is forgetful. She is also on tizanidine, which helps with her muscle spasms.  We discussed possible injection therapy, however, the pt states that her OBGYN advised against it since she recently had a miscarriage and is unsure if she wants to get pregnant in the near future.  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 She also notes an increase in anxiety as well.

## 2024-05-13 ENCOUNTER — APPOINTMENT (OUTPATIENT)
Dept: ORTHOPEDIC SURGERY | Facility: CLINIC | Age: 29
End: 2024-05-13
Payer: OTHER MISCELLANEOUS

## 2024-05-13 ENCOUNTER — NON-APPOINTMENT (OUTPATIENT)
Age: 29
End: 2024-05-13

## 2024-05-13 DIAGNOSIS — S33.5XXD SPRAIN OF LIGAMENTS OF LUMBAR SPINE, SUBSEQUENT ENCOUNTER: ICD-10-CM

## 2024-05-13 DIAGNOSIS — S60.222D CONTUSION OF LEFT HAND, SUBSEQUENT ENCOUNTER: ICD-10-CM

## 2024-05-13 DIAGNOSIS — S80.12XD CONTUSION OF LEFT LOWER LEG, SUBSEQUENT ENCOUNTER: ICD-10-CM

## 2024-05-13 DIAGNOSIS — M50.20 OTHER CERVICAL DISC DISPLACEMENT, UNSPECIFIED CERVICAL REGION: ICD-10-CM

## 2024-05-13 DIAGNOSIS — M50.122 CERVICAL DISC DISORDER AT C5-C6 LEVEL WITH RADICULOPATHY: ICD-10-CM

## 2024-05-13 DIAGNOSIS — S60.221D CONTUSION OF RIGHT HAND, SUBSEQUENT ENCOUNTER: ICD-10-CM

## 2024-05-13 PROCEDURE — 99213 OFFICE O/P EST LOW 20 MIN: CPT

## 2024-05-13 RX ORDER — IBUPROFEN 800 MG/1
800 TABLET, FILM COATED ORAL 3 TIMES DAILY
Qty: 90 | Refills: 2 | Status: ACTIVE | COMMUNITY
Start: 2024-05-13 | End: 1900-01-01

## 2024-05-13 NOTE — IMAGING
[de-identified] : Cervical spine some spasm diffuse tenderness\par  \par  Lumbar spine spasm limited motion in all planes tested positive straight leg bilaterally\par  \par   left wrist diffuse tenderness\par   right hand diffusely tender decreased \par  \par   left knee good motion tenderness mediolateral joint line minimal swelling\par  \par  X-rays left knee unremarkable\par  \par   left ankle  tenderness on both medial lateral side mild swelling negative anterior drawer Achilles intact

## 2024-05-13 NOTE — ASSESSMENT
[FreeTextEntry1] : recommended again physical therapy of the cervical and lumbar spine,  Ibuprofen and flexeril  help I asked that a copy of the nerve tests be provided  she does appear quite stressed, clinically I sense that she may have PTSD and will benefit from continued psychological assistance I will see her in 3 months  at this time she is totally disabled unable to work follow-up with pain management again recommended MRI of the left knee she is using a cane and wrist brace. and back brace I expressed my concern that most if not all of the symptoms are psychogenic

## 2024-05-13 NOTE — REASON FOR VISIT
[Parent] : parent [FreeTextEntry2] : Patient is coming in for a follow up WC DOI 01/28/2022 left knee, left foot, neck, back, and left wrist.  Still using a cane therapy has not been approved still under care of pain management and psych using a back brace

## 2024-05-13 NOTE — HISTORY OF PRESENT ILLNESS
[de-identified] : History of Present Illness\par  27-year-old female comes in today for a follow-up evaluation of her work related injury January 28, 2022. 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 She fell onto the left arm, left\par  hand and as well started to develop pain on the right elbow forearm wrist and hand. She went to [facility] and was sent\par  to [facility], she is currently in a Cam boot on the left leg and a left wrist brace\par  she has seen pain management and neurology for her cervical thoracic and lumbar pain Neck pain like a squeezing\par  pressure. when she turns her neck she feels noise inside she some headaches has radiating symptoms in the arms and\par  legs some of symptoms worsened \par  taking a muscle relaxant made her drowsy\par  she is taking ibuprofen. She has had 20 visits of therapy for the neck and back therapy sessions ended   RHD is currently\par  out work since the accident neurology made recommendations cervical lumbar spine MRI's and nerve tests upper lower\par  extremities\par  she reports having several MRIs done does not feel that the therapy is improved any of her pain she is quite anxious\par  and appears stressed still using the boot on the left leg brace on the left wrist\par   recently complaining of pain in the left knee

## 2024-07-02 ENCOUNTER — APPOINTMENT (OUTPATIENT)
Dept: PAIN MANAGEMENT | Facility: CLINIC | Age: 29
End: 2024-07-02

## 2024-07-10 LAB
MYCOPLASMA HOMINIS CULTURE: NEGATIVE
UREAPLASMA CULTURE: NEGATIVE

## 2024-07-30 NOTE — ED PROVIDER NOTE - CONDITION AT DISCHARGE:
Neurosurgical Spine Surgical Progress Note      Chief Complaint   Patient presents with   • Neurologic Problem     Post-op visit - L4/5 microdisc        HPI:  Alexandra Pineda is a 55 year old female with history of chronic and progressive low back pain that radiated to her right buttock, down the posterior right thigh to the anterior lower leg. Had associated numbness and burning mostly in the anterior lower right leg but sometimes in the posterior right thigh. MRI showed multilevel degenerative changes most notable at L4-5 where there is a disc protrusion causing causing right subarticular recess stenosis with compression of the right L5 nerve root. Given these findings, the decision was made to proceed with a right L4-5 lumbar microdiscectomy on 2024.  Pt presents today for 6 week follow up.       Diagnosis:  Patient Active Problem List   Diagnosis   • Recurrent nephrolithiasis   • Patella, chondromalacia, left   • Family history of seizure disorder   • Family history of migraine headaches   • Family history of coronary artery disease   • Diverticulosis of large intestine   • External hemorrhoid   • Pain in both lower extremities   • Numbness and tingling of both legs   • Weakness of both lower extremities       REVIEW OF SYSTEMS:  12 point review of systems completed and all negative, except as noted in HPI.    Gait imbalance, bowel or bladder incontinence are denied.       Past Medical History:   Diagnosis Date   • Anemia    • Arthritis    • Essential (primary) hypertension    • Kidney stone    • Left ovarian cyst/postmenopausal 2023    Simple appearing  2024:   Laparoscopic adnexal cystectomy, benign serous cystadenoma  Hysteroscopy for benign endometrial polyp   • Lumbar radiculopathy    • Migraine    • Renal cyst      Past Surgical History:   Procedure Laterality Date   •  delivery+postpartum care     • Hysteroscopy,bio endo/polyptmy  2024    Thickened endometrium, endometrial  polyp   • Ir renal cyst aspiration and or injection left  03/29/2024   • Laparoscopy,abdm,goerge,oment,dx Bilateral 01/08/2024    Bilateral salpingectomy for large left fallopian tube cyst   • Removal of kidney stone  2018     ALLERGIES:  No Known Allergies  Current Outpatient Medications   Medication Sig Dispense Refill   • electrolyte/PEG 3350 (Nulytely with Flavor Packs) 420 g solution Take as directed per Colonoscopy prep letter. 4000 mL 0   • HYDROcodone-acetaminophen (NORCO) 5-325 MG per tablet Take 1 tablet by mouth every 6 hours as needed for Pain. 45 tablet 0   • diazePAM (Valium) 2 MG tablet Take 1 tablet by mouth every 8 hours as needed for Muscle spasms. 42 tablet 0   • naLOXone (NARCAN) 4 MG/0.1ML nasal liquid Spray the content of 1 device into 1 nostril. Call 911. May repeat with 2nd device in alternate nostril if no response in 2-3 minutes. 2 each 1   • ibuprofen (MOTRIN) 600 MG tablet Take 600 mg by mouth every 6 hours as needed for Pain.     • SENNA PO Take 1 tablet by mouth as needed (constipation.).     • gabapentin (NEURONTIN) 300 MG capsule Take 1 capsule by mouth at bedtime. 30 capsule 0   • hydroCORTisone (ANUSOL-HC) 2.5 % rectal cream Place 1 application. rectally in the morning and 1 application. in the evening. 30 g 3   • vitamin - therapeutic multivitamins w/minerals (CENTRUM SILVER,THERA-M) Tab Take 1 tablet by mouth daily.       No current facility-administered medications for this visit.      Family History   Problem Relation Age of Onset   • Hyperlipidemia Mother    • Other Mother         spinal stenosis   • Osteoarthritis Mother    • Heart disease Father         CABG   • Hyperlipidemia Sister    • Myocardial Infarction Sister    • Other Sister         autoimmune disease   • Seizure Disorder Sister    • Hyperlipidemia Brother    • Heart disease Brother    • Other Brother         back issues   • Hyperlipidemia Brother    • Heart disease Brother         multiple MIs   • Heart disease  Brother         MI x 3   • Heart disease Brother         MI   • Osteoarthritis Brother    • Patient is unaware of any medical problems Daughter    • Cancer Maternal Aunt 80 - 89        leukemia   • Cancer Maternal Uncle    • Dementia/Alzheimers Paternal Aunt    • Cancer, Ovarian Maternal Grandmother         age?   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      Social Determinants of Health     Financial Resource Strain: Not on file   Food Insecurity: Not on file   Transportation Needs: Not on file   Physical Activity: Not on file   Stress: Not on file   Social Connections: Not on file   Interpersonal Safety: Low Risk  (5/29/2024)    Interpersonal Safety    • How often physically hurt: Never    • How often insulted or talked down to: Never    • How often threatened with harm: Never    • How often scream or curse at: Never       PHYSICAL EXAM:  Visit Vitals  BP (!) 175/95 (BP Location: RUE - Right upper extremity, Patient Position: Sitting, Cuff Size: Large Adult)   Pulse (!) 100   Ht 5' 7\" (1.702 m)   Wt 115.2 kg (254 lb)   BMI 39.78 kg/m²         Awake, Alert, Fully Oriented  EOMI  Pupils reactive  Face  symmetric  Tongue midline  HEENT within normal limits, neck supple  Extremities no Cyanosis, Clubbing or edema  Skin- surgical incision healed.    5/5 strength to Iliopsoas, quadriceps, hamstrings, Tibialis anterior, extensor hallucis longus and gastrocnemeus/soleus  Gait stable  Sensory stable       Assessment:  55 year old female presents for 6 week follow up s/p right L4-5 lumbar microdiscectomy on 6/13/2024. Prior to surgery she was having low back pain that radiated to her right buttock, down the posterior right thigh to the anterior lower leg. Had associated numbness and burning mostly in the anterior lower right leg but sometimes in the posterior right thigh. Today she reports she is feeling really good. No leg pain. Has generalized soreness in low back from incision but feels it is very mild. She is being careful to maintain restrictions. Feels she is continuing to progress and happy with outcome of the surgery.         Plan:  Follow up with me on 9/9/24 for 12 week post-op visit (phone visit).  Maintain restrictions of: no lifting more than 10 pounds, no bending, no twisting. Continue to wear back brace for 6 more weeks.   Wait 2 more weeks before getting into the pool.       On 7/30/2024, INeha APNP scribed the services personally performed by Waldemar Adan MD    The documentation recorded by the scribe accurately and completely reflects the service(s) I personally performed and the decisions made by me.     Waldemar Adan MD  Office: [phone]          Improved

## 2024-08-25 ENCOUNTER — NON-APPOINTMENT (OUTPATIENT)
Age: 29
End: 2024-08-25

## 2024-08-26 ENCOUNTER — APPOINTMENT (OUTPATIENT)
Dept: ORTHOPEDIC SURGERY | Facility: CLINIC | Age: 29
End: 2024-08-26
Payer: OTHER MISCELLANEOUS

## 2024-08-26 DIAGNOSIS — S33.5XXD SPRAIN OF LIGAMENTS OF LUMBAR SPINE, SUBSEQUENT ENCOUNTER: ICD-10-CM

## 2024-08-26 DIAGNOSIS — M50.20 OTHER CERVICAL DISC DISPLACEMENT, UNSPECIFIED CERVICAL REGION: ICD-10-CM

## 2024-08-26 DIAGNOSIS — M54.16 RADICULOPATHY, LUMBAR REGION: ICD-10-CM

## 2024-08-26 DIAGNOSIS — S60.222D CONTUSION OF LEFT HAND, SUBSEQUENT ENCOUNTER: ICD-10-CM

## 2024-08-26 DIAGNOSIS — S60.221D CONTUSION OF RIGHT HAND, SUBSEQUENT ENCOUNTER: ICD-10-CM

## 2024-08-26 DIAGNOSIS — M50.122 CERVICAL DISC DISORDER AT C5-C6 LEVEL WITH RADICULOPATHY: ICD-10-CM

## 2024-08-26 DIAGNOSIS — S80.12XD CONTUSION OF LEFT LOWER LEG, SUBSEQUENT ENCOUNTER: ICD-10-CM

## 2024-08-26 PROCEDURE — 99213 OFFICE O/P EST LOW 20 MIN: CPT

## 2024-08-26 NOTE — ASSESSMENT
[FreeTextEntry1] : recommended continue physical therapy of the cervical and lumbar spine,  Ibuprofen and gabapentin  help I asked that a copy of the nerve tests be provided  she does appear quite stressed, clinically I sense that she may have PTSD reports that it is now accepted and will benefit from continued psychological assistance I will see her in 3 months  at this time she is totally disabled unable to work follow-up with pain management again recommended MRI of the left knee she is using a cane and wrist brace. and back brace I expressed my concern that most if not all of the symptoms are psychogenic

## 2024-08-26 NOTE — REASON FOR VISIT
[Family Member] : family member [Spouse] : spouse [FreeTextEntry2] : Patient is coming in for a follow up WC DOI 01/28/2022 left knee, left foot, neck, back, and left wrist Still using a cane.  GYN has not cleared her to have any more pain management injections seeing psych regularly does have a brace remains on gabapentin ibuprofen
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
## 2024-08-26 NOTE — IMAGING
[de-identified] : Cervical spine some spasm diffuse tenderness\par  \par  Lumbar spine spasm limited motion in all planes tested positive straight leg bilaterally\par  \par   left wrist diffuse tenderness\par   right hand diffusely tender decreased \par  \par   left knee good motion tenderness mediolateral joint line minimal swelling\par  \par  X-rays left knee unremarkable\par  \par   left ankle  tenderness on both medial lateral side mild swelling negative anterior drawer Achilles intact

## 2024-08-26 NOTE — IMAGING
[de-identified] : Cervical spine some spasm diffuse tenderness\par  \par  Lumbar spine spasm limited motion in all planes tested positive straight leg bilaterally\par  \par   left wrist diffuse tenderness\par   right hand diffusely tender decreased \par  \par   left knee good motion tenderness mediolateral joint line minimal swelling\par  \par  X-rays left knee unremarkable\par  \par   left ankle  tenderness on both medial lateral side mild swelling negative anterior drawer Achilles intact
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
## 2024-10-23 ENCOUNTER — APPOINTMENT (OUTPATIENT)
Dept: OBGYN | Facility: CLINIC | Age: 29
End: 2024-10-23
Payer: COMMERCIAL

## 2024-10-23 DIAGNOSIS — N96 RECURRENT PREGNANCY LOSS: ICD-10-CM

## 2024-10-23 LAB
HCG UR QL: NEGATIVE
QUALITY CONTROL: YES

## 2024-10-23 PROCEDURE — 76830 TRANSVAGINAL US NON-OB: CPT

## 2024-10-23 PROCEDURE — 99214 OFFICE O/P EST MOD 30 MIN: CPT | Mod: 25

## 2024-10-23 PROCEDURE — 81025 URINE PREGNANCY TEST: CPT

## 2024-10-23 PROCEDURE — 99459 PELVIC EXAMINATION: CPT

## 2024-10-26 LAB — BACTERIA UR CULT: NORMAL

## 2024-10-30 RX ORDER — MEDROXYPROGESTERONE ACETATE 10 MG/1
10 TABLET ORAL DAILY
Qty: 10 | Refills: 3 | Status: ACTIVE | COMMUNITY
Start: 2024-10-30 | End: 1900-01-01

## 2024-11-11 DIAGNOSIS — N64.4 MASTODYNIA: ICD-10-CM

## 2024-12-16 ENCOUNTER — APPOINTMENT (OUTPATIENT)
Dept: ORTHOPEDIC SURGERY | Facility: CLINIC | Age: 29
End: 2024-12-16
Payer: OTHER MISCELLANEOUS

## 2024-12-16 ENCOUNTER — NON-APPOINTMENT (OUTPATIENT)
Age: 29
End: 2024-12-16

## 2024-12-16 DIAGNOSIS — M50.122 CERVICAL DISC DISORDER AT C5-C6 LEVEL WITH RADICULOPATHY: ICD-10-CM

## 2024-12-16 DIAGNOSIS — S60.221D CONTUSION OF RIGHT HAND, SUBSEQUENT ENCOUNTER: ICD-10-CM

## 2024-12-16 DIAGNOSIS — M50.20 OTHER CERVICAL DISC DISPLACEMENT, UNSPECIFIED CERVICAL REGION: ICD-10-CM

## 2024-12-16 DIAGNOSIS — S23.9XXA SPRAIN OF UNSPECIFIED PARTS OF THORAX, INITIAL ENCOUNTER: ICD-10-CM

## 2024-12-16 DIAGNOSIS — S80.12XD CONTUSION OF LEFT LOWER LEG, SUBSEQUENT ENCOUNTER: ICD-10-CM

## 2024-12-16 DIAGNOSIS — M54.16 RADICULOPATHY, LUMBAR REGION: ICD-10-CM

## 2024-12-16 DIAGNOSIS — S60.222D CONTUSION OF LEFT HAND, SUBSEQUENT ENCOUNTER: ICD-10-CM

## 2024-12-16 DIAGNOSIS — S33.5XXD SPRAIN OF LIGAMENTS OF LUMBAR SPINE, SUBSEQUENT ENCOUNTER: ICD-10-CM

## 2024-12-16 PROCEDURE — 73560 X-RAY EXAM OF KNEE 1 OR 2: CPT | Mod: LT

## 2024-12-16 PROCEDURE — 99213 OFFICE O/P EST LOW 20 MIN: CPT

## 2025-01-07 ENCOUNTER — NON-APPOINTMENT (OUTPATIENT)
Age: 30
End: 2025-01-07

## 2025-01-07 ENCOUNTER — APPOINTMENT (OUTPATIENT)
Dept: OBGYN | Facility: CLINIC | Age: 30
End: 2025-01-07
Payer: COMMERCIAL

## 2025-01-07 VITALS
BODY MASS INDEX: 35.55 KG/M2 | SYSTOLIC BLOOD PRESSURE: 120 MMHG | DIASTOLIC BLOOD PRESSURE: 75 MMHG | HEIGHT: 69 IN | WEIGHT: 240 LBS

## 2025-01-07 DIAGNOSIS — Z01.411 ENCOUNTER FOR GYNECOLOGICAL EXAMINATION (GENERAL) (ROUTINE) WITH ABNORMAL FINDINGS: ICD-10-CM

## 2025-01-07 PROCEDURE — 99459 PELVIC EXAMINATION: CPT

## 2025-01-07 PROCEDURE — 99395 PREV VISIT EST AGE 18-39: CPT

## 2025-01-13 ENCOUNTER — APPOINTMENT (OUTPATIENT)
Dept: UROGYNECOLOGY | Facility: CLINIC | Age: 30
End: 2025-01-13
Payer: COMMERCIAL

## 2025-01-13 ENCOUNTER — LABORATORY RESULT (OUTPATIENT)
Age: 30
End: 2025-01-13

## 2025-01-13 VITALS
SYSTOLIC BLOOD PRESSURE: 107 MMHG | DIASTOLIC BLOOD PRESSURE: 73 MMHG | BODY MASS INDEX: 35.55 KG/M2 | HEIGHT: 69 IN | HEART RATE: 89 BPM | WEIGHT: 240 LBS

## 2025-01-13 DIAGNOSIS — N84.0 POLYP OF CORPUS UTERI: ICD-10-CM

## 2025-01-13 DIAGNOSIS — N39.46 MIXED INCONTINENCE: ICD-10-CM

## 2025-01-13 DIAGNOSIS — M62.89 OTHER SPECIFIED DISORDERS OF MUSCLE: ICD-10-CM

## 2025-01-13 DIAGNOSIS — R39.89 OTHER SYMPTOMS AND SIGNS INVOLVING THE GENITOURINARY SYSTEM: ICD-10-CM

## 2025-01-13 DIAGNOSIS — N94.10 UNSPECIFIED DYSPAREUNIA: ICD-10-CM

## 2025-01-13 LAB — CYTOLOGY CVX/VAG DOC THIN PREP: NORMAL

## 2025-01-13 PROCEDURE — 99459 PELVIC EXAMINATION: CPT

## 2025-01-13 PROCEDURE — 99205 OFFICE O/P NEW HI 60 MIN: CPT | Mod: 25

## 2025-01-13 PROCEDURE — 51701 INSERT BLADDER CATHETER: CPT

## 2025-01-15 LAB
APPEARANCE: CLEAR
BILIRUBIN URINE: NEGATIVE
BLOOD URINE: NEGATIVE
COLOR: NORMAL
GLUCOSE QUALITATIVE U: NEGATIVE
KETONES URINE: NEGATIVE
LEUKOCYTE ESTERASE URINE: NEGATIVE
NITRITE URINE: POSITIVE
PH URINE: 6
PROTEIN URINE: ABNORMAL
SPECIFIC GRAVITY URINE: >=1.03
UROBILINOGEN URINE: NORMAL

## 2025-01-16 LAB — URINE CULTURE <10: NORMAL

## 2025-01-17 RX ORDER — NITROFURANTOIN (MONOHYDRATE/MACROCRYSTALS) 25; 75 MG/1; MG/1
100 CAPSULE ORAL
Qty: 10 | Refills: 0 | Status: ACTIVE | COMMUNITY
Start: 2025-01-16 | End: 1900-01-01

## 2025-01-24 RX ORDER — DIAZEPAM 5 MG/1
5 TABLET ORAL
Qty: 28 | Refills: 0 | Status: ACTIVE | COMMUNITY
Start: 2025-01-13 | End: 1900-01-01

## 2025-01-24 RX ORDER — METHENAMINE, BENZOIC ACID, PHENYL SALICYLATE, METHYLENE BLUE, AND HYOSCYAMINE SULFATE 9; .12; 81.6; 10.8; 36.2 MG/1; MG/1; MG/1; MG/1; MG/1
81.6 TABLET, COATED ORAL 4 TIMES DAILY
Qty: 60 | Refills: 2 | Status: ACTIVE | COMMUNITY
Start: 2025-01-13 | End: 1900-01-01

## 2025-02-06 ENCOUNTER — APPOINTMENT (OUTPATIENT)
Dept: BREAST CENTER | Facility: CLINIC | Age: 30
End: 2025-02-06

## 2025-02-06 VITALS
BODY MASS INDEX: 34.07 KG/M2 | WEIGHT: 230 LBS | DIASTOLIC BLOOD PRESSURE: 93 MMHG | HEIGHT: 69 IN | HEART RATE: 115 BPM | SYSTOLIC BLOOD PRESSURE: 114 MMHG

## 2025-02-06 DIAGNOSIS — N63.0 UNSPECIFIED LUMP IN UNSPECIFIED BREAST: ICD-10-CM

## 2025-02-06 PROCEDURE — 99204 OFFICE O/P NEW MOD 45 MIN: CPT

## 2025-02-06 NOTE — ASU DISCHARGE PLAN (ADULT/PEDIATRIC) - PHYSICIAN SECTION COMPLETE
or she may not be able to receive the vaccine, or may need to wait until the other treatments are finished.  This list is not complete. Other drugs may affect this vaccine, including prescription and over-the-counter medicines, vitamins, and herbal products. Not all possible drug interactions are listed here.  Where can I get more information?  Your child's vaccination provider, pharmacist, or doctor can provide more information about this vaccine. Additional information is available from your local health department or the Centers for Disease Control and Prevention.  Remember, keep this and all other medicines out of the reach of children, never share your medicines with others, and use this medication only for the indication prescribed.   Every effort has been made to ensure that the information provided by [facility]. ('[employer]') is accurate, up-to-date, and complete, but no guarantee is made to that effect. Drug information contained herein may be time sensitive. [employer] information has been compiled for use by healthcare practitioners and consumers in the [place] and therefore [employer] does not warrant that uses outside of the [place] are appropriate, unless specifically indicated otherwise. [employer] drug information does not endorse drugs, diagnose patients or recommend therapy. [employer] drug information is an informational resource designed to assist licensed healthcare practitioners in caring for their patients and/or to serve consumers viewing this service as a supplement to, and not a substitute for, the expertise, skill, knowledge and judgment of healthcare practitioners. The absence of a warning for a given drug or drug combination in no way should be construed to indicate that the drug or drug combination is safe, effective or appropriate for any given patient. [employer] does not assume any responsibility for any aspect of healthcare administered with the aid of information [employer] provides. 
Yes

## 2025-02-07 ENCOUNTER — RESULT REVIEW (OUTPATIENT)
Age: 30
End: 2025-02-07

## 2025-02-07 ENCOUNTER — OUTPATIENT (OUTPATIENT)
Dept: OUTPATIENT SERVICES | Facility: HOSPITAL | Age: 30
LOS: 1 days | End: 2025-02-07
Payer: COMMERCIAL

## 2025-02-07 DIAGNOSIS — N63.0 UNSPECIFIED LUMP IN UNSPECIFIED BREAST: ICD-10-CM

## 2025-02-07 DIAGNOSIS — Z98.890 OTHER SPECIFIED POSTPROCEDURAL STATES: Chronic | ICD-10-CM

## 2025-02-07 PROCEDURE — G0279: CPT

## 2025-02-07 PROCEDURE — 76641 ULTRASOUND BREAST COMPLETE: CPT | Mod: 50

## 2025-02-07 PROCEDURE — 77066 DX MAMMO INCL CAD BI: CPT

## 2025-02-07 PROCEDURE — 77066 DX MAMMO INCL CAD BI: CPT | Mod: 26

## 2025-02-07 PROCEDURE — 76641 ULTRASOUND BREAST COMPLETE: CPT | Mod: 26,50

## 2025-02-07 PROCEDURE — G0279: CPT | Mod: 26

## 2025-02-08 DIAGNOSIS — N63.0 UNSPECIFIED LUMP IN UNSPECIFIED BREAST: ICD-10-CM

## 2025-02-20 RX ORDER — MEDROXYPROGESTERONE ACETATE 10 MG/1
10 TABLET ORAL DAILY
Qty: 10 | Refills: 0 | Status: ACTIVE | COMMUNITY
Start: 2025-02-20 | End: 1900-01-01

## 2025-02-20 RX ORDER — NITROFURANTOIN (MONOHYDRATE/MACROCRYSTALS) 25; 75 MG/1; MG/1
100 CAPSULE ORAL
Qty: 14 | Refills: 0 | Status: ACTIVE | COMMUNITY
Start: 2025-02-20 | End: 1900-01-01

## 2025-03-16 PROBLEM — N60.01 BREAST CYST, RIGHT: Status: ACTIVE | Noted: 2025-03-16

## 2025-03-21 ENCOUNTER — EMERGENCY (EMERGENCY)
Facility: HOSPITAL | Age: 30
LOS: 0 days | Discharge: HOME | End: 2025-03-21
Attending: STUDENT IN AN ORGANIZED HEALTH CARE EDUCATION/TRAINING PROGRAM
Payer: COMMERCIAL

## 2025-03-21 VITALS
SYSTOLIC BLOOD PRESSURE: 119 MMHG | OXYGEN SATURATION: 97 % | TEMPERATURE: 98 F | HEART RATE: 84 BPM | DIASTOLIC BLOOD PRESSURE: 83 MMHG | RESPIRATION RATE: 16 BRPM

## 2025-03-21 VITALS — WEIGHT: 229.94 LBS | HEIGHT: 69 IN

## 2025-03-21 DIAGNOSIS — H92.01 OTALGIA, RIGHT EAR: ICD-10-CM

## 2025-03-21 DIAGNOSIS — Z88.1 ALLERGY STATUS TO OTHER ANTIBIOTIC AGENTS: ICD-10-CM

## 2025-03-21 DIAGNOSIS — Z88.0 ALLERGY STATUS TO PENICILLIN: ICD-10-CM

## 2025-03-21 DIAGNOSIS — Z98.890 OTHER SPECIFIED POSTPROCEDURAL STATES: Chronic | ICD-10-CM

## 2025-03-21 PROCEDURE — 99283 EMERGENCY DEPT VISIT LOW MDM: CPT

## 2025-03-21 PROCEDURE — 99282 EMERGENCY DEPT VISIT SF MDM: CPT

## 2025-03-21 NOTE — ED PROVIDER NOTE - PHYSICAL EXAMINATION
VITAL SIGNS: I have reviewed nursing notes and confirm.  CONSTITUTIONAL: Well-developed; well-nourished; in no acute distress.  SKIN: Skin exam is warm and dry, no acute rash.  HEAD: Normocephalic; atraumatic.  EYES: Conjunctiva and sclera clear.  ENT: No nasal discharge; airway clear. TMs clear. (+) mild TTP to R posterior ear, no swelling/lumps/erythema/warmth. Oropharynx clear. Uvula midline.   NECK: Supple; non tender.  CARD: Regular rate and rhythm.  RESP: Speaking in full sentences.   EXT: Normal ROM.  NEURO: Alert, oriented. Grossly unremarkable. No focal deficits.

## 2025-03-21 NOTE — ED ADULT NURSE NOTE - NSFALLUNIVINTERV_ED_ALL_ED
Bed/Stretcher in lowest position, wheels locked, appropriate side rails in place/Call bell, personal items and telephone in reach/Instruct patient to call for assistance before getting out of bed/chair/stretcher/Non-slip footwear applied when patient is off stretcher/[place] to call system/Physically safe environment - no spills, clutter or unnecessary equipment/Purposeful proactive rounding/Room/bathroom lighting operational, light cord in reach

## 2025-03-21 NOTE — ED ADULT NURSE NOTE - NSFALLOOBATTEMPT_ED_ALL_ED
08/01/2023  - Rhabdomyolysis is improving  -Decreased urine output; ordered bedside bladder scan; results 509 mL   - Ordered a straight cath  -Flomax 0.8mg       07/31/2023  - Repeated CPK and Myoglobin; 24,240 and 5,787 respectively; trending  in the right direction  -Continue IVF resuscitation;  mL/Hr  -Continue urine output     No

## 2025-03-21 NOTE — ED PROVIDER NOTE - OBJECTIVE STATEMENT
30-year-old female with no significant past medical history presents ED complaining of pain behind her right ear that has been ongoing since yesterday.  Patient feels a bump there.  She denies history of similar symptoms in the past.  She describes the pain as sharp, comes in waves and worse with palpation.  She denies other complaints. Pt denies fever, chills, headache, dizziness, chest pain, SOB, back pain, LOC, trauma, cough, hearing changes, ear discharge.

## 2025-03-21 NOTE — ED PROVIDER NOTE - CLINICAL SUMMARY MEDICAL DECISION MAKING FREE TEXT BOX
31 yo female, PMHx of HTN, presenting for a painful bump behind right ear, x 2 days, no alleviating or aggravating factors, no associated symptoms.  Denies fevers, chills, trauma, neck pain, weakness.  Well appearing on exam.  In no acute distress.  No neck tenderness.  FROM of neck.  Bilateral TMs visible without erythema or bulging.  Small pinpoint bump posterior to right lower ear, hard to touch.  No erythema or discharge.  Discussed return precautions and follow up outpatient with ENT.  Patient comfortable with plan.

## 2025-03-21 NOTE — ED PROVIDER NOTE - PATIENT PORTAL LINK FT
You can access the FollowMyHealth Patient Portal offered by [facility] by registering at the following website: [email or web address]. By joining [facility]’s FollowMyHealth portal, you will also be able to view your health information using other applications (apps) compatible with our system.

## 2025-03-21 NOTE — ED ADULT TRIAGE NOTE - AVIAN FLU SYMPTOMS
[facility] Medicine Services  PROGRESS NOTE    Patient Name: Steffen Zeng  : 1939  MRN: [id number]    Date of Admission: 2020  Primary Care Physician: Provider, No Known    Subjective   Subjective     CC: Follow-up with LLE DVT    HPI: No acute events overnight, patient said that she slept well, said that her feet feel much better.  She does endorse very mild pain.    Review of Systems  Gen- No fevers, chills  CV- No chest pain, palpitations  Resp- No cough, dyspnea  GI- No N/V/D, abd pain    All systems reviewed currently negative except as mentioned in HPI above    Objective   Objective     Vital Signs:   Temp:  [97.3 °F (36.3 °C)-98.5 °F (36.9 °C)] 97.8 °F (36.6 °C)  Heart Rate:  [70-79] 78  Resp:  [16-18] 18  BP: (131-162)/(61-83) 162/83  Total (NIH Stroke Scale): 1     Physical Exam:  Constitutional: Elderly lady, in no acute distress, awake, alert  HENT: NCAT, mucous membranes moist  Respiratory: Clear to auscultation bilaterally, respiratory effort normal   Cardiovascular: RRR, no murmurs, rubs, or gallops, palpable pedal pulses bilaterally  Gastrointestinal: Positive bowel sounds, soft, nontender, nondistended  Musculoskeletal: No bilateral ankle edema right lower extremity, left lateral ankle wounds, under dressing  Psychiatric: Appropriate affect, cooperative  Neurologic: Oriented x 3, no focal deficits, tremors  Skin: No rashes    Results Reviewed:  Results from last 7 days   Lab Units 20  0903 20  1045 20  0520  0055   WBC 10*3/mm3 9.84  --  12.87* 15.43*   HEMOGLOBIN g/dL 12.1  --  11.4* 12.3   HEMATOCRIT % 36.6  --  35.4 37.7   PLATELETS 10*3/mm3 153  --  138* 144   INR   --  1.12  --  1.16   PROCALCITONIN ng/mL  --   --   --  7.52*     Results from last 7 days   Lab Units 20  0903 20  0513 20  0055   SODIUM mmol/L 139 138 138   POTASSIUM mmol/L 3.5 3.2* 3.5   CHLORIDE mmol/L 102 101 99   CO2 mmol/L 25.0 23.0 25.0   BUN  mg/dL 17 22 23   CREATININE mg/dL 0.96 1.10* 1.24*   GLUCOSE mg/dL 294* 284* 334*   CALCIUM mg/dL 9.1 8.8 8.8   ALT (SGPT) U/L  --  35* 37*   AST (SGOT) U/L  --  60* 61*   TROPONIN T ng/mL  --  0.411* 0.447*     Estimated Creatinine Clearance: 55.6 mL/min (by C-G formula based on SCr of 0.96 mg/dL).    Microbiology Results Abnormal     Procedure Component Value - Date/Time    Urine Culture - Urine, Urine, Clean Catch [[id number]]  (Normal) Collected:  07/05/20 0723    Lab Status:  Final result Specimen:  Urine, Clean Catch Updated:  07/06/20 1030     Urine Culture No growth    COVID PRE-OP / PRE-PROCEDURE SCREENING ORDER (NO ISOLATION) - Swab, Nasopharynx [[id number]] Collected:  07/05/20 0137    Lab Status:  Final result Specimen:  Swab from Nasopharynx Updated:  07/05/20 0249    Narrative:       The following orders were created for panel order COVID PRE-OP / PRE-PROCEDURE SCREENING ORDER (NO ISOLATION) - Swab, Nasopharynx.  Procedure                               Abnormality         Status                     ---------                               -----------         ------                     COVID-19,CEPHEID,MYA IN-...[[id number]]  Normal              Final result                 Please view results for these tests on the individual orders.    COVID-19,CEPHEID,MYA IN-HOUSE(OR EMERGENT/ADD-ON),NP SWAB IN TRANSPORT MEDIA 3-4 HR TAT - Swab, Nasopharynx [[id number]]  (Normal) Collected:  07/05/20 0137    Lab Status:  Final result Specimen:  Swab from Nasopharynx Updated:  07/05/20 0249     COVID19 Not Detected    Narrative:       Fact sheet for providers: https://www.fda.gov/media/687052/download     Fact sheet for patients: https://www.fda.gov/media/504455/download          Imaging Results (Last 24 Hours)     Procedure Component Value Units Date/Time    CT Angiogram Chest With & Without Contrast [[id number]] Collected:  07/06/20 1451     Updated:  07/06/20 1843    Narrative:       EXAMINATION: CT ANGIOGRAM CHEST W WO  CONTRAST-07/06/2020:      INDICATION: DVT; Z74.09-Other reduced mobility; Z78.9-Other specified  health status, chest pain, hypoxia.     TECHNIQUE: Multiple axial CT imaging was obtained of the chest with and  without the administration of intravenous contrast.     The radiation dose reduction device was turned on for each scan per the  ALARA (As Low as Reasonably Achievable) protocol.     COMPARISON: NONE.     FINDINGS: The thyroid is enlarged and heterogeneous in appearance. Small  lymph nodes seen scattered throughout the mediastinum. Filling defects  seen in the right mid and lower lobe pulmonary arteries suggesting  thrombus and pulmonary emboli. The pulmonary emboli are incompletely  obstructing. RV-LV ratio is within normal limits. There are  bronchiectatic changes and wall thickening identified of the bronchioles  in the left lower lobe. There are mild increased markings suggesting  atelectatic change in the lung bases bilaterally. No dense  consolidation. No definite pleural effusion. Degenerative changes seen  within the spine. The visualized upper abdomen is unremarkable.       Impression:       Nonobstructing thrombus within the right mid and lower lobe  pulmonary arteries suggesting pulmonary emboli. Atelectatic changes seen  in the lung bases with bronchial wall thickening seen in the left lower  lobe bronchioles. The RV-LV ratio is within normal limits.     D:  07/06/2020  E:  07/06/2020                   Results for orders placed during the hospital encounter of 07/04/20   Adult Transthoracic Echo Complete W/ Cont if Necessary Per Protocol    Narrative · Cardiac chamber sizes and wall thicknesses are normal. All wall   thicknesses are normal.  · Left ventricular systolic function appears to be at the lower limits of   normal to slightly depressed with an estimated ejection fraction of 51% -   55%.  · The trileaflet aortic valve has some degree of calcific changes.   Significant aortic stenosis/aortic  insufficiency is not seen.  · The mitral valve is normal in structure and function.  · There is moderate tricuspid regurgitation with an estimated right   ventricular systolic pressure of 43 mmHg, sugesting moderately severe PA   hypertension.  · An agitated saline study reveals no evidence of intracardiac shunting.  · There are no other important findings on this study.          I have reviewed the medications:  Scheduled Meds:    amitriptyline 10 mg Oral Nightly   apixaban 5 mg Oral Q12H   aspirin 325 mg Oral Daily   Or      aspirin 300 mg Rectal Daily   atorvastatin 80 mg Oral Nightly   carvedilol 12.5 mg Oral BID With Meals   castor oil-balsam peru  Topical Q12H   cefTRIAXone 1 g Intravenous Q24H   DULoxetine 60 mg Oral Nightly   famotidine 40 mg Oral Daily   ferrous sulfate 325 mg Oral Daily With Breakfast   fluticasone 2 spray Each Nare Daily   insulin detemir 10 Units Subcutaneous Q12H   insulin lispro 0-7 Units Subcutaneous TID AC   isosorbide mononitrate 30 mg Oral Q24H   levothyroxine 50 mcg Oral Daily   losartan 50 mg Oral Q24H   sodium chloride 10 mL Intravenous Q12H   sodium chloride 10 mL Intravenous Q12H     Continuous Infusions:   PRN Meds:.•  acetaminophen **OR** acetaminophen **OR** acetaminophen  •  acetaminophen  •  cyclobenzaprine  •  dextrose  •  dextrose  •  docusate sodium  •  glucagon (human recombinant)  •  HYDROcodone-acetaminophen  •  ipratropium-albuterol  •  LORazepam  •  Morphine  •  ondansetron **OR** ondansetron  •  sodium chloride  •  sodium chloride  •  trolamine salicylate    Assessment/Plan   Assessment & Plan     Active Hospital Problems    Diagnosis  POA   • **Left-sided weakness [R53.1]  Yes   • Acute pulmonary embolism without acute cor pulmonale (CMS/HCC) [I26.99]  Yes   • History of CVA (cerebrovascular accident) [Z86.73]  Not Applicable   • Hyperlipidemia LDL goal <70 [E78.5]  Yes   • CKD (chronic kidney disease) stage 3, GFR 30-59 ml/min (CMS/HCC) [N18.3]  Yes   • Demand  No ischemia (CMS/[facility]) [I24.8]  Yes   • Essential hypertension [I10]  Yes   • Coronary artery disease involving native coronary artery of native heart without angina pectoris [I25.10]  Yes   • Chronic heart failure (CMS/[facility]) [I50.9]  Yes   • Uncontrolled type 2 diabetes mellitus (CMS/[facility]) [E11.65]  Yes   • Acute UTI (urinary tract infection) [N39.0]  Yes   • Acute DVT (deep venous thrombosis) (CMS/[facility]) [I82.409]  Yes   • Acute hypoxemic respiratory failure (CMS/[facility]) [J96.01]  Yes      Resolved Hospital Problems    Diagnosis Date Resolved POA   • NSTEMI, initial episode of care (CMS/[facility]) [I21.4] 07/06/2020 Yes        Brief Hospital Course to date:  Steffen Zeng is a 80 y.o. female with past medical history of CKD stage III, hyperlipidemia type 2 diabetes, hypertension.  Patient presented with left-sided weakness, initially concerning for CVA, however venous duplex did reveal acute left lower extremity DVT.  She is currently on Eliquis.    Plan  Acute left lower extremity DVT and PE  CTA chest shows nonobstructing thrombus in right mid and lower pulmonary arteries suggesting PE no evidence of RV strain  -continue Eliquis    Left-sided weakness, ?TIA  -Suspect this was secondary to above, not CVA , however CT perfusion was positive for ischemic changes in the left occipital lobe, CTA head and neck were unrevealing, MRI head did show small focus of encephalomalacia no evidence of acute infarction  -Cards rec d/c ASA as she is now on Eliquis  -Neurology following    CKD stage III-renal function seems to be at baseline, continue to monitor, avoid nephrotoxins.    Hypertension-BP currently elevated, currently on Coreg and Imdur, cardiology has started low-dose losartan, will continue for now adjust as appropriate    Heart failure with preserved EF  -Currently compensated, continue beta-blocker    Poorly controlled type 2 diabetes with A1c 8.2%  -FSBG's have been reviewed and currently suboptimal  -Increase basal Levemir to 10  units bid, continue SSI    Suspected UTI-urine cultures NGTD, will DC Rocephin she has received 3 days of antibiotics    Hypothyroidism-continue Synthroid    Neuropathy-previously on Tegretol, still discontinued moving forward, some interaction with Eliquis    Right lower extremity, left lateral ankle wounds-WOC following    Chronic severe back pain/spinal stenosis    Anxiety/depression-continue Cymbalta    DVT Prophylaxis: Eliquis    Disposition: TBD    All problems listed above are new to me as this is my first encounter with patient    CODE STATUS:   Code Status and Medical Interventions:   Ordered at: 07/05/20 0205     Limited Support to NOT Include:    Cardioversion/Defibrillation    Intubation     Level Of Support Discussed With:    Next of Kin (If No Surrogate)     Code Status:    No CPR     Medical Interventions (Level of Support Prior to Arrest):    Limited     Comments:    Discussed with daughter at length, patient never wished to be on the ventilator or wanted CPR.         Electronically signed by Berna Mims MD, 07/07/20, 10:04.

## 2025-03-21 NOTE — ED PROVIDER NOTE - NSFOLLOWUPINSTRUCTIONS_ED_ALL_ED_FT
Earache    WHAT YOU NEED TO KNOW:    An earache can be caused by a problem within your ear or from another body area. Common causes include earwax buildup, objects in your ear, injury, infections, or jaw or dental problems. Less often, earaches may be caused by arthritis in your upper spine.    DISCHARGE INSTRUCTIONS:    Return to the emergency department if:     You have a severe earache.      You have ear pain with itching, hearing loss, dizziness, a feeling of fullness in your ear, or ringing in your ears.    Contact your healthcare provider if:     Your ear pain worsens or does not go away with treatment.      You have drainage from your ear.      You have a fever.       Your outer ear becomes red, swollen, and warm.      You have questions or concerns about your condition or care.    Medicines: You may need any of the following:     NSAIDs, such as ibuprofen, help decrease swelling, pain, and fever. This medicine is available with or without a doctor's order. NSAIDs can cause stomach bleeding or kidney problems in certain people. If you take blood thinner medicine, always ask if NSAIDs are safe for you. Always read the medicine label and follow directions. Do not give these medicines to children under 6 months of age without direction from your child's healthcare provider.      Acetaminophen decreases pain and fever. It is available without a doctor's order. Ask how much to take and how often to take it. Follow directions. Acetaminophen can cause liver damage if not taken correctly.      Do not give aspirin to children under 18 years of age. Your child could develop Reye syndrome if he takes aspirin. Reye syndrome can cause life-threatening brain and liver damage. Check your child's medicine labels for aspirin, salicylates, or oil of wintergreen.       Take your medicine as directed. Call your healthcare provider if you think your medicine is not helping or if you have side effects. Tell him if you are allergic to any medicine. Keep a list of the medicines, vitamins, and herbs you take. Include the amounts, and when and why you take them. Bring the list or the pill bottles to follow-up visits. Carry your medicine list with you in case of an emergency.    Follow up with your healthcare provider as directed: Write down your questions so you remember to ask them during your visits.       © Copyright [employer] 2019 All illustrations and images included in [employer] are the copyrighted property of [employer] or [employer].

## 2025-03-24 ENCOUNTER — APPOINTMENT (OUTPATIENT)
Dept: ORTHOPEDIC SURGERY | Facility: CLINIC | Age: 30
End: 2025-03-24
Payer: OTHER MISCELLANEOUS

## 2025-03-24 DIAGNOSIS — M50.122 CERVICAL DISC DISORDER AT C5-C6 LEVEL WITH RADICULOPATHY: ICD-10-CM

## 2025-03-24 DIAGNOSIS — S60.222D CONTUSION OF LEFT HAND, SUBSEQUENT ENCOUNTER: ICD-10-CM

## 2025-03-24 DIAGNOSIS — M50.20 OTHER CERVICAL DISC DISPLACEMENT, UNSPECIFIED CERVICAL REGION: ICD-10-CM

## 2025-03-24 DIAGNOSIS — S33.5XXD SPRAIN OF LIGAMENTS OF LUMBAR SPINE, SUBSEQUENT ENCOUNTER: ICD-10-CM

## 2025-03-24 DIAGNOSIS — S23.9XXA SPRAIN OF UNSPECIFIED PARTS OF THORAX, INITIAL ENCOUNTER: ICD-10-CM

## 2025-03-24 DIAGNOSIS — S80.12XD CONTUSION OF LEFT LOWER LEG, SUBSEQUENT ENCOUNTER: ICD-10-CM

## 2025-03-24 DIAGNOSIS — M54.16 RADICULOPATHY, LUMBAR REGION: ICD-10-CM

## 2025-03-24 DIAGNOSIS — S60.221D CONTUSION OF RIGHT HAND, SUBSEQUENT ENCOUNTER: ICD-10-CM

## 2025-03-24 PROCEDURE — 99213 OFFICE O/P EST LOW 20 MIN: CPT

## 2025-04-08 ENCOUNTER — APPOINTMENT (OUTPATIENT)
Dept: UROGYNECOLOGY | Facility: CLINIC | Age: 30
End: 2025-04-08

## 2025-04-10 ENCOUNTER — LABORATORY RESULT (OUTPATIENT)
Age: 30
End: 2025-04-10

## 2025-04-10 ENCOUNTER — APPOINTMENT (OUTPATIENT)
Dept: UROGYNECOLOGY | Facility: CLINIC | Age: 30
End: 2025-04-10
Payer: COMMERCIAL

## 2025-04-10 ENCOUNTER — EMERGENCY (EMERGENCY)
Facility: HOSPITAL | Age: 30
LOS: 0 days | Discharge: HOME | End: 2025-04-10
Attending: EMERGENCY MEDICINE
Payer: COMMERCIAL

## 2025-04-10 VITALS
BODY MASS INDEX: 34.07 KG/M2 | HEART RATE: 102 BPM | DIASTOLIC BLOOD PRESSURE: 73 MMHG | SYSTOLIC BLOOD PRESSURE: 109 MMHG | WEIGHT: 230 LBS | HEIGHT: 69 IN

## 2025-04-10 VITALS
DIASTOLIC BLOOD PRESSURE: 87 MMHG | WEIGHT: 229.94 LBS | RESPIRATION RATE: 18 BRPM | TEMPERATURE: 98 F | OXYGEN SATURATION: 98 % | HEIGHT: 69 IN | HEART RATE: 120 BPM | SYSTOLIC BLOOD PRESSURE: 123 MMHG

## 2025-04-10 VITALS — HEART RATE: 90 BPM

## 2025-04-10 DIAGNOSIS — R30.0 DYSURIA: ICD-10-CM

## 2025-04-10 DIAGNOSIS — Z98.890 OTHER SPECIFIED POSTPROCEDURAL STATES: Chronic | ICD-10-CM

## 2025-04-10 DIAGNOSIS — R07.9 CHEST PAIN, UNSPECIFIED: ICD-10-CM

## 2025-04-10 DIAGNOSIS — M62.89 OTHER SPECIFIED DISORDERS OF MUSCLE: ICD-10-CM

## 2025-04-10 DIAGNOSIS — R39.89 OTHER SYMPTOMS AND SIGNS INVOLVING THE GENITOURINARY SYSTEM: ICD-10-CM

## 2025-04-10 DIAGNOSIS — R33.9 RETENTION OF URINE, UNSPECIFIED: ICD-10-CM

## 2025-04-10 DIAGNOSIS — R00.0 TACHYCARDIA, UNSPECIFIED: ICD-10-CM

## 2025-04-10 DIAGNOSIS — Z88.0 ALLERGY STATUS TO PENICILLIN: ICD-10-CM

## 2025-04-10 DIAGNOSIS — Z88.1 ALLERGY STATUS TO OTHER ANTIBIOTIC AGENTS: ICD-10-CM

## 2025-04-10 LAB
ALBUMIN SERPL ELPH-MCNC: 4.3 G/DL — SIGNIFICANT CHANGE UP (ref 3.5–5.2)
ALP SERPL-CCNC: 109 U/L — SIGNIFICANT CHANGE UP (ref 30–115)
ALT FLD-CCNC: 21 U/L — SIGNIFICANT CHANGE UP (ref 0–41)
ANION GAP SERPL CALC-SCNC: 10 MMOL/L — SIGNIFICANT CHANGE UP (ref 7–14)
AST SERPL-CCNC: 14 U/L — SIGNIFICANT CHANGE UP (ref 0–41)
BASE EXCESS BLDV CALC-SCNC: -0.4 MMOL/L — SIGNIFICANT CHANGE UP (ref -2–3)
BASOPHILS # BLD AUTO: 0.05 K/UL — SIGNIFICANT CHANGE UP (ref 0–0.2)
BASOPHILS NFR BLD AUTO: 0.5 % — SIGNIFICANT CHANGE UP (ref 0–1)
BILIRUB SERPL-MCNC: <0.2 MG/DL — SIGNIFICANT CHANGE UP (ref 0.2–1.2)
BUN SERPL-MCNC: 11 MG/DL — SIGNIFICANT CHANGE UP (ref 10–20)
CA-I SERPL-SCNC: 1.18 MMOL/L — SIGNIFICANT CHANGE UP (ref 1.15–1.33)
CALCIUM SERPL-MCNC: 9.4 MG/DL — SIGNIFICANT CHANGE UP (ref 8.4–10.5)
CHLORIDE SERPL-SCNC: 105 MMOL/L — SIGNIFICANT CHANGE UP (ref 98–110)
CO2 SERPL-SCNC: 25 MMOL/L — SIGNIFICANT CHANGE UP (ref 17–32)
CREAT SERPL-MCNC: 0.8 MG/DL — SIGNIFICANT CHANGE UP (ref 0.7–1.5)
EGFR: 102 ML/MIN/1.73M2 — SIGNIFICANT CHANGE UP
EGFR: 102 ML/MIN/1.73M2 — SIGNIFICANT CHANGE UP
EOSINOPHIL # BLD AUTO: 0.27 K/UL — SIGNIFICANT CHANGE UP (ref 0–0.7)
EOSINOPHIL NFR BLD AUTO: 2.8 % — SIGNIFICANT CHANGE UP (ref 0–8)
GAS PNL BLDV: 137 MMOL/L — SIGNIFICANT CHANGE UP (ref 136–145)
GAS PNL BLDV: SIGNIFICANT CHANGE UP
GAS PNL BLDV: SIGNIFICANT CHANGE UP
GLUCOSE SERPL-MCNC: 96 MG/DL — SIGNIFICANT CHANGE UP (ref 70–99)
HCG SERPL QL: NEGATIVE — SIGNIFICANT CHANGE UP
HCO3 BLDV-SCNC: 25 MMOL/L — SIGNIFICANT CHANGE UP (ref 22–29)
HCT VFR BLD CALC: 42.8 % — SIGNIFICANT CHANGE UP (ref 37–47)
HCT VFR BLDA CALC: 42 % — SIGNIFICANT CHANGE UP (ref 34.5–46.5)
HGB BLD CALC-MCNC: 14.1 G/DL — SIGNIFICANT CHANGE UP (ref 11.7–16.1)
HGB BLD-MCNC: 14.8 G/DL — SIGNIFICANT CHANGE UP (ref 12–16)
IMM GRANULOCYTES NFR BLD AUTO: 0.2 % — SIGNIFICANT CHANGE UP (ref 0.1–0.3)
LACTATE BLDV-MCNC: 1.5 MMOL/L — SIGNIFICANT CHANGE UP (ref 0.5–2)
LIDOCAIN IGE QN: 32 U/L — SIGNIFICANT CHANGE UP (ref 7–60)
LYMPHOCYTES # BLD AUTO: 2.38 K/UL — SIGNIFICANT CHANGE UP (ref 1.2–3.4)
LYMPHOCYTES # BLD AUTO: 25.1 % — SIGNIFICANT CHANGE UP (ref 20.5–51.1)
MAGNESIUM SERPL-MCNC: 2.1 MG/DL — SIGNIFICANT CHANGE UP (ref 1.8–2.4)
MCHC RBC-ENTMCNC: 30.6 PG — SIGNIFICANT CHANGE UP (ref 27–31)
MCHC RBC-ENTMCNC: 34.6 G/DL — SIGNIFICANT CHANGE UP (ref 32–37)
MCV RBC AUTO: 88.6 FL — SIGNIFICANT CHANGE UP (ref 81–99)
MONOCYTES # BLD AUTO: 0.66 K/UL — HIGH (ref 0.1–0.6)
MONOCYTES NFR BLD AUTO: 7 % — SIGNIFICANT CHANGE UP (ref 1.7–9.3)
NEUTROPHILS # BLD AUTO: 6.1 K/UL — SIGNIFICANT CHANGE UP (ref 1.4–6.5)
NEUTROPHILS NFR BLD AUTO: 64.4 % — SIGNIFICANT CHANGE UP (ref 42.2–75.2)
NRBC BLD AUTO-RTO: 0 /100 WBCS — SIGNIFICANT CHANGE UP (ref 0–0)
PCO2 BLDV: 45 MMHG — HIGH (ref 39–42)
PH BLDV: 7.36 — SIGNIFICANT CHANGE UP (ref 7.32–7.43)
PLATELET # BLD AUTO: 281 K/UL — SIGNIFICANT CHANGE UP (ref 130–400)
PMV BLD: 9.1 FL — SIGNIFICANT CHANGE UP (ref 7.4–10.4)
PO2 BLDV: 38 MMHG — SIGNIFICANT CHANGE UP (ref 25–45)
POTASSIUM BLDV-SCNC: 4.2 MMOL/L — SIGNIFICANT CHANGE UP (ref 3.5–5.1)
POTASSIUM SERPL-MCNC: 4.7 MMOL/L — SIGNIFICANT CHANGE UP (ref 3.5–5)
POTASSIUM SERPL-SCNC: 4.7 MMOL/L — SIGNIFICANT CHANGE UP (ref 3.5–5)
PROT SERPL-MCNC: 8 G/DL — SIGNIFICANT CHANGE UP (ref 6–8)
RBC # BLD: 4.83 M/UL — SIGNIFICANT CHANGE UP (ref 4.2–5.4)
RBC # FLD: 11.6 % — SIGNIFICANT CHANGE UP (ref 11.5–14.5)
SAO2 % BLDV: 62.2 % — LOW (ref 67–88)
SODIUM SERPL-SCNC: 140 MMOL/L — SIGNIFICANT CHANGE UP (ref 135–146)
TROPONIN T, HIGH SENSITIVITY RESULT: <6 NG/L — SIGNIFICANT CHANGE UP (ref 6–13)
WBC # BLD: 9.48 K/UL — SIGNIFICANT CHANGE UP (ref 4.8–10.8)
WBC # FLD AUTO: 9.48 K/UL — SIGNIFICANT CHANGE UP (ref 4.8–10.8)

## 2025-04-10 PROCEDURE — 93010 ELECTROCARDIOGRAM REPORT: CPT

## 2025-04-10 PROCEDURE — 83735 ASSAY OF MAGNESIUM: CPT

## 2025-04-10 PROCEDURE — 82330 ASSAY OF CALCIUM: CPT

## 2025-04-10 PROCEDURE — 99284 EMERGENCY DEPT VISIT MOD MDM: CPT

## 2025-04-10 PROCEDURE — 85014 HEMATOCRIT: CPT

## 2025-04-10 PROCEDURE — 84295 ASSAY OF SERUM SODIUM: CPT

## 2025-04-10 PROCEDURE — 71046 X-RAY EXAM CHEST 2 VIEWS: CPT

## 2025-04-10 PROCEDURE — 36415 COLL VENOUS BLD VENIPUNCTURE: CPT

## 2025-04-10 PROCEDURE — 83605 ASSAY OF LACTIC ACID: CPT

## 2025-04-10 PROCEDURE — 99285 EMERGENCY DEPT VISIT HI MDM: CPT | Mod: 25

## 2025-04-10 PROCEDURE — 93005 ELECTROCARDIOGRAM TRACING: CPT

## 2025-04-10 PROCEDURE — 83690 ASSAY OF LIPASE: CPT

## 2025-04-10 PROCEDURE — 82803 BLOOD GASES ANY COMBINATION: CPT

## 2025-04-10 PROCEDURE — 51701 INSERT BLADDER CATHETER: CPT

## 2025-04-10 PROCEDURE — 99459 PELVIC EXAMINATION: CPT

## 2025-04-10 PROCEDURE — 85025 COMPLETE CBC W/AUTO DIFF WBC: CPT

## 2025-04-10 PROCEDURE — 99214 OFFICE O/P EST MOD 30 MIN: CPT | Mod: 25

## 2025-04-10 PROCEDURE — 80053 COMPREHEN METABOLIC PANEL: CPT

## 2025-04-10 PROCEDURE — 36000 PLACE NEEDLE IN VEIN: CPT

## 2025-04-10 PROCEDURE — 85018 HEMOGLOBIN: CPT

## 2025-04-10 PROCEDURE — 84132 ASSAY OF SERUM POTASSIUM: CPT

## 2025-04-10 PROCEDURE — 84703 CHORIONIC GONADOTROPIN ASSAY: CPT

## 2025-04-10 PROCEDURE — 84484 ASSAY OF TROPONIN QUANT: CPT

## 2025-04-10 PROCEDURE — 71046 X-RAY EXAM CHEST 2 VIEWS: CPT | Mod: 26

## 2025-04-10 RX ORDER — HYDROXYZINE HYDROCHLORIDE 25 MG/1
1 TABLET, FILM COATED ORAL
Qty: 12 | Refills: 0
Start: 2025-04-10 | End: 2025-04-12

## 2025-04-10 RX ORDER — HYDROXYZINE HYDROCHLORIDE 25 MG/1
25 TABLET, FILM COATED ORAL ONCE
Refills: 0 | Status: COMPLETED | OUTPATIENT
Start: 2025-04-10 | End: 2025-04-10

## 2025-04-10 RX ADMIN — HYDROXYZINE HYDROCHLORIDE 25 MILLIGRAM(S): 25 TABLET, FILM COATED ORAL at 19:35

## 2025-04-10 NOTE — ED PROVIDER NOTE - NSFOLLOWUPINSTRUCTIONS_ED_ALL_ED_FT
Tachycardia    WHAT YOU NEED TO KNOW:    What do I need to know about tachycardia? Tachycardia (fast heart rate) is when your heart rate is 100 beats per minute or more at rest.  Heart Chambers    What causes or increases my risk for tachycardia? It is normal for the heart rate to increase with activity or exercise and then decrease when you stop. A fast heart rate at rest may be caused by any of the following:    Anxiety, stress, or pain    Fever    Physical fatigue or strenuous exercise    Large amounts of caffeine such as coffee, tea, and energy drinks    Heavy alcohol use, cigarette smoking, or drugs such as cocaine    Some medicines, such as inhalers, cold medicines, and hypertension medicines    Increased thyroid hormone level  What other symptoms may I have with a fast heart rate? You may have no other symptoms with your fast heart rate, or you may have any of the following:    Dizziness or lightheadedness    Chest pain    Fluttering or pounding heart    Shortness of breath  How is a fast heart rate treated? You may need treatment if your fast heart rate continues or happens often. You may need medicine, procedures, or surgery. Your provider may also send you to a cardiologist for other tests.    How do I check my heart rate (pulse)? Your healthcare provider will show you how to check your pulse, and how often to check it. Write down how fast your pulse is and if it feels regular or like it is skipping beats. Also write down the activity you were doing if your heart rate is above 100. Bring the information with you to your follow-up appointment.  How to Take a Pulse    What can I do to help manage or prevent a fast heart rate?    Talk to your healthcare provider about all your current medicines. He or she may change a medicine if it is causing your fast heart rate. Do not stop taking any medicine unless directed by your provider.    Have less caffeine. Caffeine can increase your heart rate.    Limit or do not drink alcohol. Alcohol can increase your heart rate. Ask your healthcare provider if it is okay for you to drink any alcohol. He or she can help you set limits for the number of drinks you have in 24 hours and in a week. A drink of alcohol is 12 ounces of beer, 5 ounces of wine, or 1½ ounces of liquor.    Do not smoke. Nicotine and other chemicals in cigarettes can cause damage to your heart. Ask your healthcare provider for information if you currently smoke and need help to quit. E-cigarettes or smokeless tobacco still contain nicotine. Talk to your healthcare provider before you use these products.    Do not use illegal drugs. Drugs such as meth and cocaine can increase your heart rate. Talk to your healthcare provider if you use illegal drugs and want to quit.    Get more rest. Fatigue can cause your heart rate to increase. Ask your healthcare provider about the best exercise plan for you.    Eat heart-healthy foods. These include fruits, vegetables, whole-grain breads, low-fat dairy products, beans, lean meats, and fish. Replace butter and margarine with heart-healthy oils such as olive oil and canola oil.        Exercise regularly and maintain a healthy weight. Ask about the best exercise plan for you. Ask your healthcare provider what a healthy weight is for you. Ask him or her to help you create a safe weight loss plan if you are overweight.   Family Walking for Exercise      Learn ways to cope with stress. Stress, fear, and anxiety can cause a fast heart rate. Your healthcare provider may recommend relaxation techniques and deep breathing exercises. Your healthcare provider may recommend you talk to someone about your stress or anxiety, such as a counselor or a trusted friend.  Call your local emergency number (911 in the [place]) or have someone call if:    You have any of the following signs of a heart attack:  Squeezing, pressure, or pain in your chest    You may also have any of the following:  Discomfort or pain in your back, neck, jaw, stomach, or arm    Shortness of breath    Nausea or vomiting    Lightheadedness or a sudden cold sweat    You cannot be woken.  When should I call my doctor?    Your pulse is faster than you were told it should be.    You have a fast heart rate often.    You feel weak or dizzy.    You have questions or concerns about your condition or care.  CARE AGREEMENT:    You have the right to help plan your care. Learn about your health condition and how it may be treated. Discuss treatment options with your healthcare providers to decide what care you want to receive. You always have the right to refuse treatment.

## 2025-04-10 NOTE — ED ADULT TRIAGE NOTE - CHIEF COMPLAINT QUOTE
Pt complaining of tachycardia to 181 at home with chest pain and SOB starting immediately PTA. Pt took 80mg ASA PTA.

## 2025-04-10 NOTE — ED PROVIDER NOTE - ATTENDING CONTRIBUTION TO CARE
30-year-old female to ED with chest pain.  History of prior cardiac workup extensive including electrophysiologist cardiologist.  She has also had a coronary CT which was unremarkable  Prior to arrival  today noticed her heart rate elevated took some aspirin and came to the ED    Afebrile vital signs stable exam as noted clear lungs bilaterally conjunctiva pink HEENT normal, regular rate and rhythm abdomen soft nontender and neuro nonfocal.

## 2025-04-10 NOTE — ED PROVIDER NOTE - PATIENT'S PREFERRED PRONOUN
[facility]cent Urgent Visit    Patient is a 38 year old, female     Name: Lula Rodriguez  YOB: 1985  Today's Date: 3/14/2024    Chief Complaint:   Chief Complaint   Patient presents with    Sore Throat     Painful when swallowing, congestion & phlegm X 1 week       History of Present Illness: Pt here for evaluation of intermittent sore throat for about 1 week.  When gets home in the evening throat feels swollen, tender-worst pain last night  Also with nasal congestion which she feels may be her allergies. Has h/o seasonal allergies  No fever, cough, N/V, body aches or HA.   No medications or treatments for symptoms.         Review of System: Review of Systems   Constitutional: Negative.  Negative for chills, fatigue and fever.   HENT:  Positive for rhinorrhea, sore throat and trouble swallowing. Negative for congestion, ear pain and sinus pressure.    Respiratory: Negative.  Negative for cough, chest tightness and wheezing.    Cardiovascular: Negative.  Negative for chest pain.   Gastrointestinal: Negative.  Negative for nausea and vomiting.   Musculoskeletal: Negative.  Negative for myalgias.   Neurological: Negative.  Negative for dizziness, light-headedness and headaches.   Psychiatric/Behavioral: Negative.         Vitals:   Vitals:    03/14/24 0950   BP: 131/83   BP Location: RUE - Right upper extremity   Patient Position: Sitting   Cuff Size: Regular   Pulse: 94   Temp: 98.2 °F (36.8 °C)   TempSrc: Temporal   SpO2: 100%   Weight: 57.6 kg (127 lb)   Height: 4' 11\" (1.499 m)   PainSc:  0       Physical Exam: Physical Exam  Vitals reviewed.   Constitutional:       Appearance: Normal appearance.   HENT:      Right Ear: Tympanic membrane normal.      Left Ear: Tympanic membrane normal.      Nose: Nose normal.      Mouth/Throat:      Mouth: Mucous membranes are moist.      Pharynx: Posterior oropharyngeal erythema present. No oropharyngeal exudate.   Cardiovascular:       Rate and Rhythm: Normal rate and regular rhythm.      Heart sounds: Normal heart sounds.   Pulmonary:      Effort: Pulmonary effort is normal.      Breath sounds: Normal breath sounds.   Neurological:      Mental Status: She is alert and oriented to person, place, and time.   Psychiatric:         Mood and Affect: Mood normal.         Behavior: Behavior normal.             Assesment & Plan:   Problem List Items Addressed This Visit    None  Visit Diagnoses       Sore throat    -  Primary    Relevant Orders    POCT Rapid strep A (Completed)    Viral pharyngitis              Pt with intermittent pain in throat over the last week. When going home from work in the evening her throat will feel swollen and tender. Will have pain with swallowing. Pain felt the worst last night so decided to come in to be checked out.   Pt well appearing, sitting comfortably on exam table. Mild erythema to back of throat, no exudates. Physical exam is otherwise normal. Lung sounds are clear.   POCT for strep is negative.   Instructions given for supportive care.         Jessie Mobley, CNP    [facility]   [address] [place] [address]  Phone [phone]         Her/She

## 2025-04-10 NOTE — ED ADULT NURSE NOTE - AS PAIN REST
Medication: Clonazepam 0.5 mg  Last office visit date:  1/18/2023  Medication Refill Protocol Failed.      3 (mild pain)

## 2025-04-10 NOTE — ED PROVIDER NOTE - PHYSICAL EXAMINATION
VITAL SIGNS: noted  CONSTITUTIONAL: Well-developed; well-nourished; in no acute distress  HEAD: Normocephalic; atraumatic  EYES: PERRL, EOM intact; conjunctiva and sclera clear  ENT: No nasal discharge;, MMM, oropharynx clear without tonsillar hypertrophy or exudates  NECK: Supple; non tender. No anterior cervical lymphadenopathy noted  CARD: S1, S2 normal; no murmurs, gallops, or rubs. tachycardia and regular rhythm   RESP: CTAB/L, no wheezes, rales or rhonchi  ABD: Normal bowel sounds; soft; non-distended; non-tender; no organoomegaly. No CVA tenderness  EXT: Normal ROM. No calf tenderness or edema. Distal pulses intact  NEURO: Awake and alert, oriented. Grossly unremarkable. No focal deficits.  SKIN: Skin exam is warm and dry, no acute rash

## 2025-04-10 NOTE — ED PROVIDER NOTE - OBJECTIVE STATEMENT
Pt is a 30 y.o Female with PMHx anxiety who presents to the ED with chief complaint of rapid heart rate. Pt states she has experienced this before in the past and has extensive cardiac workup and ultimately diagnosed with anxiety. Pt states she was driving in the car this afternoon when her heart started to race. Pt denies any LOC or syncope. Per pt, she has seen 4 different cardiologists and has worn halter monitors, had echocardiograms and stress tests which she states were all negative. Pt has had chest ct  two years ago negative for PE. Pt also had CT and CTA of head which were negative. Pt states that when she takes Aspirin her symptoms improve. Per pt her HR goes to 180s and when she takes Aspirin her symptoms improve. Pt states she took Aspirin today and her HR improved. Denies any current cp, sob, abdominal pain back pain numbness tingling focal weakness fever chills or recent uri symptoms.

## 2025-04-14 LAB — URINE CULTURE <10: ABNORMAL

## 2025-04-14 RX ORDER — BACLOFEN 5 MG/1
5 TABLET ORAL
Qty: 60 | Refills: 1 | Status: ACTIVE | COMMUNITY
Start: 2025-04-14 | End: 1900-01-01

## 2025-04-14 RX ORDER — METHENAMINE/SODIUM SALICYLATE 162-162.5
162-162.5 TABLET ORAL
Qty: 60 | Refills: 5 | Status: ACTIVE | COMMUNITY
Start: 2025-04-14 | End: 1900-01-01

## 2025-04-14 NOTE — CHART NOTE - NSCHARTNOTEFT_GEN_A_CORE
afternoon appt, Tues or Thurs 4/11- AC/ schedule don 5/1/25 @ 3p w/ Dr. Rees @ [facility]- 4/14- AC

## 2025-05-01 ENCOUNTER — APPOINTMENT (OUTPATIENT)
Dept: ELECTROPHYSIOLOGY | Facility: CLINIC | Age: 30
End: 2025-05-01

## 2025-05-30 RX ORDER — MEDROXYPROGESTERONE ACETATE 10 MG/1
10 TABLET ORAL DAILY
Qty: 10 | Refills: 1 | Status: ACTIVE | COMMUNITY
Start: 2025-05-29 | End: 1900-01-01

## 2025-06-11 ENCOUNTER — NON-APPOINTMENT (OUTPATIENT)
Age: 30
End: 2025-06-11

## 2025-06-11 ENCOUNTER — APPOINTMENT (OUTPATIENT)
Dept: OBGYN | Facility: CLINIC | Age: 30
End: 2025-06-11
Payer: COMMERCIAL

## 2025-06-11 VITALS
HEIGHT: 69 IN | WEIGHT: 250 LBS | DIASTOLIC BLOOD PRESSURE: 75 MMHG | SYSTOLIC BLOOD PRESSURE: 115 MMHG | BODY MASS INDEX: 37.03 KG/M2

## 2025-06-11 PROCEDURE — 99459 PELVIC EXAMINATION: CPT

## 2025-06-11 PROCEDURE — 99213 OFFICE O/P EST LOW 20 MIN: CPT

## 2025-06-13 LAB
BV BACTERIA RRNA VAG QL NAA+PROBE: NOT DETECTED
C GLABRATA RNA VAG QL NAA+PROBE: NOT DETECTED
C TRACH RRNA SPEC QL NAA+PROBE: NOT DETECTED
CANDIDA RRNA VAG QL PROBE: NOT DETECTED
N GONORRHOEA RRNA SPEC QL NAA+PROBE: NOT DETECTED
T VAGINALIS RRNA SPEC QL NAA+PROBE: NOT DETECTED

## 2025-06-15 LAB — BACTERIA UR CULT: NORMAL

## 2025-06-24 ENCOUNTER — APPOINTMENT (OUTPATIENT)
Dept: UROGYNECOLOGY | Facility: CLINIC | Age: 30
End: 2025-06-24

## 2025-06-25 ENCOUNTER — ASOB RESULT (OUTPATIENT)
Age: 30
End: 2025-06-25

## 2025-06-25 ENCOUNTER — APPOINTMENT (OUTPATIENT)
Dept: ANTEPARTUM | Facility: CLINIC | Age: 30
End: 2025-06-25
Payer: COMMERCIAL

## 2025-06-25 PROBLEM — N91.1 AMENORRHEA, SECONDARY: Status: RESOLVED | Noted: 2024-03-21 | Resolved: 2025-06-25

## 2025-06-25 PROCEDURE — 76830 TRANSVAGINAL US NON-OB: CPT

## 2025-06-25 PROCEDURE — 76856 US EXAM PELVIC COMPLETE: CPT | Mod: 59

## 2025-06-26 ENCOUNTER — RX RENEWAL (OUTPATIENT)
Age: 30
End: 2025-06-26

## 2025-09-16 ENCOUNTER — APPOINTMENT (OUTPATIENT)
Dept: ORTHOPEDIC SURGERY | Facility: CLINIC | Age: 30
End: 2025-09-16
Payer: OTHER MISCELLANEOUS

## 2025-09-16 ENCOUNTER — TRANSCRIPTION ENCOUNTER (OUTPATIENT)
Age: 30
End: 2025-09-16

## 2025-09-16 DIAGNOSIS — S33.5XXD SPRAIN OF LIGAMENTS OF LUMBAR SPINE, SUBSEQUENT ENCOUNTER: ICD-10-CM

## 2025-09-16 DIAGNOSIS — S80.12XD CONTUSION OF LEFT LOWER LEG, SUBSEQUENT ENCOUNTER: ICD-10-CM

## 2025-09-16 DIAGNOSIS — S60.222D CONTUSION OF LEFT HAND, SUBSEQUENT ENCOUNTER: ICD-10-CM

## 2025-09-16 DIAGNOSIS — M54.16 RADICULOPATHY, LUMBAR REGION: ICD-10-CM

## 2025-09-16 DIAGNOSIS — M50.20 OTHER CERVICAL DISC DISPLACEMENT, UNSPECIFIED CERVICAL REGION: ICD-10-CM

## 2025-09-16 DIAGNOSIS — M50.122 CERVICAL DISC DISORDER AT C5-C6 LEVEL WITH RADICULOPATHY: ICD-10-CM

## 2025-09-16 DIAGNOSIS — M54.12 RADICULOPATHY, CERVICAL REGION: ICD-10-CM

## 2025-09-16 PROCEDURE — 72110 X-RAY EXAM L-2 SPINE 4/>VWS: CPT
